# Patient Record
Sex: FEMALE | Race: WHITE | Employment: OTHER | URBAN - METROPOLITAN AREA
[De-identification: names, ages, dates, MRNs, and addresses within clinical notes are randomized per-mention and may not be internally consistent; named-entity substitution may affect disease eponyms.]

---

## 2017-04-12 ENCOUNTER — HOSPITAL ENCOUNTER (OUTPATIENT)
Dept: OTHER | Age: 52
Discharge: HOME | End: 2017-04-12
Attending: INTERNAL MEDICINE | Admitting: INTERNAL MEDICINE

## 2017-04-12 LAB
ALBUMIN SERPL-MCNC: 4.8 GM/DL (ref 3.4–5)
ALP BLD-CCNC: 164 IU/L (ref 40–128)
ALT SERPL-CCNC: 16 U/L (ref 10–40)
ANION GAP SERPL CALCULATED.3IONS-SCNC: 14 MMOL/L (ref 4–16)
AST SERPL-CCNC: 16 IU/L (ref 15–37)
BILIRUB SERPL-MCNC: 0.6 MG/DL (ref 0–1)
BUN BLDV-MCNC: 12 MG/DL (ref 6–23)
CALCIUM SERPL-MCNC: 10 MG/DL (ref 8.3–10.6)
CHLORIDE BLD-SCNC: 100 MMOL/L (ref 99–110)
CO2: 25 MMOL/L (ref 21–32)
CREAT SERPL-MCNC: 0.7 MG/DL (ref 0.6–1.1)
GFR AFRICAN AMERICAN: >60 ML/MIN/1.73M2
GFR NON-AFRICAN AMERICAN: >60 ML/MIN/1.73M2
GLUCOSE BLD-MCNC: 105 MG/DL (ref 70–140)
POTASSIUM SERPL-SCNC: 5 MMOL/L (ref 3.5–5.1)
SODIUM BLD-SCNC: 139 MMOL/L (ref 135–145)
TOTAL PROTEIN: 7.5 GM/DL (ref 6.4–8.2)

## 2017-11-16 ENCOUNTER — HOSPITAL ENCOUNTER (OUTPATIENT)
Dept: CT IMAGING | Age: 52
Discharge: HOME | End: 2017-11-16
Attending: INTERNAL MEDICINE | Admitting: INTERNAL MEDICINE

## 2017-11-16 DIAGNOSIS — J43.9 PULMONARY EMPHYSEMA, UNSPECIFIED EMPHYSEMA TYPE (HCC): ICD-10-CM

## 2017-11-17 ENCOUNTER — HOSPITAL ENCOUNTER (OUTPATIENT)
Dept: PULMONOLOGY | Age: 52
Discharge: HOME | End: 2017-11-17
Attending: INTERNAL MEDICINE | Admitting: INTERNAL MEDICINE

## 2017-11-17 DIAGNOSIS — J43.9 PULMONARY EMPHYSEMA (HCC): ICD-10-CM

## 2018-04-13 ENCOUNTER — HOSPITAL ENCOUNTER (OUTPATIENT)
Dept: CT IMAGING | Age: 53
Discharge: HOME | End: 2018-04-13
Attending: INTERNAL MEDICINE | Admitting: INTERNAL MEDICINE

## 2018-04-13 DIAGNOSIS — C34.12 MALIGNANT NEOPLASM OF UPPER LOBE, LEFT BRONCHUS OR LUNG (HCC): ICD-10-CM

## 2018-04-13 DIAGNOSIS — C34.12 CANCER OF BRONCHUS OF LEFT UPPER LOBE (HCC): ICD-10-CM

## 2018-04-13 RX ORDER — SODIUM CHLORIDE 0.9 % (FLUSH) 0.9 %
10 SYRINGE (ML) INJECTION ONCE
Status: COMPLETED | OUTPATIENT
Start: 2018-04-13 | End: 2018-04-13

## 2018-04-13 RX ADMIN — Medication 10 ML: at 13:21

## 2018-04-16 ENCOUNTER — HOSPITAL ENCOUNTER (OUTPATIENT)
Dept: OTHER | Age: 53
Discharge: HOME | End: 2018-04-16
Attending: INTERNAL MEDICINE | Admitting: INTERNAL MEDICINE

## 2018-04-16 LAB
ALBUMIN SERPL-MCNC: 4.5 GM/DL (ref 3.4–5)
ALP BLD-CCNC: 158 IU/L (ref 40–129)
ALT SERPL-CCNC: 17 U/L (ref 10–40)
ANION GAP SERPL CALCULATED.3IONS-SCNC: 14 MMOL/L (ref 4–16)
AST SERPL-CCNC: 22 IU/L (ref 15–37)
BILIRUB SERPL-MCNC: 0.4 MG/DL (ref 0–1)
BUN BLDV-MCNC: 15 MG/DL (ref 6–23)
CALCIUM SERPL-MCNC: 9.6 MG/DL (ref 8.3–10.6)
CHLORIDE BLD-SCNC: 100 MMOL/L (ref 99–110)
CO2: 26 MMOL/L (ref 21–32)
CREAT SERPL-MCNC: 0.8 MG/DL (ref 0.6–1.1)
GFR AFRICAN AMERICAN: >60 ML/MIN/1.73M2
GFR NON-AFRICAN AMERICAN: >60 ML/MIN/1.73M2
GLUCOSE BLD-MCNC: 129 MG/DL (ref 70–99)
POTASSIUM SERPL-SCNC: 4.1 MMOL/L (ref 3.5–5.1)
SODIUM BLD-SCNC: 140 MMOL/L (ref 135–145)
TOTAL PROTEIN: 7.2 GM/DL (ref 6.4–8.2)

## 2018-07-20 ENCOUNTER — HOSPITAL ENCOUNTER (OUTPATIENT)
Dept: OTHER | Age: 53
Discharge: HOME | End: 2018-07-20
Attending: INTERNAL MEDICINE | Admitting: INTERNAL MEDICINE

## 2018-07-20 ENCOUNTER — HOSPITAL ENCOUNTER (OUTPATIENT)
Dept: CT IMAGING | Age: 53
Discharge: HOME | End: 2018-07-20
Attending: INTERNAL MEDICINE | Admitting: INTERNAL MEDICINE

## 2018-07-20 DIAGNOSIS — C34.12 MALIGNANT NEOPLASM OF UPPER LOBE, LEFT BRONCHUS OR LUNG (HCC): ICD-10-CM

## 2018-07-20 DIAGNOSIS — C34.12 CANCER OF BRONCHUS OF LEFT UPPER LOBE (HCC): ICD-10-CM

## 2018-07-20 LAB
ALBUMIN SERPL-MCNC: 4.6 GM/DL (ref 3.4–5)
ALP BLD-CCNC: 139 IU/L (ref 40–128)
ALT SERPL-CCNC: 19 U/L (ref 10–40)
ANION GAP SERPL CALCULATED.3IONS-SCNC: 16 MMOL/L (ref 4–16)
AST SERPL-CCNC: 20 IU/L (ref 15–37)
BILIRUB SERPL-MCNC: 0.6 MG/DL (ref 0–1)
BUN BLDV-MCNC: 19 MG/DL (ref 6–23)
CALCIUM SERPL-MCNC: 9.9 MG/DL (ref 8.3–10.6)
CHLORIDE BLD-SCNC: 103 MMOL/L (ref 99–110)
CO2: 25 MMOL/L (ref 21–32)
CREAT SERPL-MCNC: 0.8 MG/DL (ref 0.6–1.1)
GFR AFRICAN AMERICAN: >60 ML/MIN/1.73M2
GFR NON-AFRICAN AMERICAN: >60 ML/MIN/1.73M2
GLUCOSE BLD-MCNC: 219 MG/DL (ref 70–99)
POTASSIUM SERPL-SCNC: 4.2 MMOL/L (ref 3.5–5.1)
SODIUM BLD-SCNC: 144 MMOL/L (ref 135–145)
TOTAL PROTEIN: 7.4 GM/DL (ref 6.4–8.2)

## 2018-07-20 RX ORDER — 0.9 % SODIUM CHLORIDE 0.9 %
10 VIAL (ML) INJECTION PRN
Status: DISCONTINUED | OUTPATIENT
Start: 2018-07-20 | End: 2018-07-21 | Stop reason: HOSPADM

## 2018-07-20 RX ADMIN — Medication 10 ML: at 13:00

## 2019-01-22 ENCOUNTER — HOSPITAL ENCOUNTER (OUTPATIENT)
Age: 54
Setting detail: SPECIMEN
Discharge: HOME | End: 2019-01-22
Payer: COMMERCIAL

## 2019-01-22 LAB
ALBUMIN SERPL-MCNC: 5.5 GM/DL (ref 3.4–5)
ALP BLD-CCNC: 156 IU/L (ref 40–128)
ALT SERPL-CCNC: 25 U/L (ref 10–40)
ANION GAP SERPL CALCULATED.3IONS-SCNC: 14 MMOL/L (ref 4–16)
AST SERPL-CCNC: 25 IU/L (ref 15–37)
BILIRUB SERPL-MCNC: 0.4 MG/DL (ref 0–1)
BUN BLDV-MCNC: 11 MG/DL (ref 6–23)
CALCIUM SERPL-MCNC: 10.4 MG/DL (ref 8.3–10.6)
CHLORIDE BLD-SCNC: 101 MMOL/L (ref 99–110)
CO2: 26 MMOL/L (ref 21–32)
CREAT SERPL-MCNC: 0.8 MG/DL (ref 0.6–1.1)
GFR AFRICAN AMERICAN: >60 ML/MIN/1.73M2
GFR NON-AFRICAN AMERICAN: >60 ML/MIN/1.73M2
GLUCOSE BLD-MCNC: 112 MG/DL (ref 70–99)
POTASSIUM SERPL-SCNC: 4.7 MMOL/L (ref 3.5–5.1)
SODIUM BLD-SCNC: 141 MMOL/L (ref 135–145)
TOTAL PROTEIN: 8 GM/DL (ref 6.4–8.2)

## 2019-01-22 PROCEDURE — 80053 COMPREHEN METABOLIC PANEL: CPT

## 2019-04-12 ENCOUNTER — HOSPITAL ENCOUNTER (OUTPATIENT)
Age: 54
Discharge: HOME | End: 2019-04-12
Payer: MEDICARE

## 2019-04-12 ENCOUNTER — HOSPITAL ENCOUNTER (OUTPATIENT)
Dept: GENERAL RADIOLOGY | Age: 54
Discharge: HOME | End: 2019-04-12
Payer: MEDICARE

## 2019-04-12 DIAGNOSIS — C34.90 NON-SMALL CELL LUNG CANCER, UNSPECIFIED LATERALITY (HCC): ICD-10-CM

## 2019-04-12 PROCEDURE — 71046 X-RAY EXAM CHEST 2 VIEWS: CPT

## 2019-10-17 ENCOUNTER — HOSPITAL ENCOUNTER (OUTPATIENT)
Age: 54
Setting detail: SPECIMEN
Discharge: HOME | End: 2019-10-17
Payer: COMMERCIAL

## 2019-10-17 LAB
ALBUMIN SERPL-MCNC: 4.7 GM/DL (ref 3.4–5)
ALP BLD-CCNC: 126 IU/L (ref 40–128)
ALT SERPL-CCNC: 24 U/L (ref 10–40)
ANION GAP SERPL CALCULATED.3IONS-SCNC: 15 MMOL/L (ref 4–16)
AST SERPL-CCNC: 24 IU/L (ref 15–37)
BILIRUB SERPL-MCNC: 0.4 MG/DL (ref 0–1)
BUN BLDV-MCNC: 14 MG/DL (ref 6–23)
CALCIUM SERPL-MCNC: 9.9 MG/DL (ref 8.3–10.6)
CHLORIDE BLD-SCNC: 105 MMOL/L (ref 99–110)
CO2: 25 MMOL/L (ref 21–32)
CREAT SERPL-MCNC: 0.7 MG/DL (ref 0.6–1.1)
GFR AFRICAN AMERICAN: >60 ML/MIN/1.73M2
GFR NON-AFRICAN AMERICAN: >60 ML/MIN/1.73M2
GLUCOSE BLD-MCNC: 135 MG/DL (ref 70–99)
POTASSIUM SERPL-SCNC: 3.9 MMOL/L (ref 3.5–5.1)
SODIUM BLD-SCNC: 145 MMOL/L (ref 135–145)
TOTAL PROTEIN: 7.1 GM/DL (ref 6.4–8.2)

## 2019-10-17 PROCEDURE — 80053 COMPREHEN METABOLIC PANEL: CPT

## 2019-10-18 ENCOUNTER — HOSPITAL ENCOUNTER (OUTPATIENT)
Dept: CT IMAGING | Age: 54
Discharge: HOME | End: 2019-10-18
Payer: MEDICARE

## 2019-10-18 DIAGNOSIS — C34.12 CANCER OF BRONCHUS OF LEFT UPPER LOBE (HCC): ICD-10-CM

## 2019-10-18 PROCEDURE — 6360000004 HC RX CONTRAST MEDICATION: Performed by: INTERNAL MEDICINE

## 2019-10-18 PROCEDURE — 71260 CT THORAX DX C+: CPT

## 2019-10-18 RX ADMIN — IOPAMIDOL 75 ML: 755 INJECTION, SOLUTION INTRAVENOUS at 09:04

## 2020-01-17 ENCOUNTER — HOSPITAL ENCOUNTER (OUTPATIENT)
Dept: CT IMAGING | Age: 55
Discharge: HOME | End: 2020-01-17
Payer: MEDICARE

## 2020-01-17 ENCOUNTER — HOSPITAL ENCOUNTER (OUTPATIENT)
Dept: INFUSION THERAPY | Age: 55
Discharge: HOME | End: 2020-01-17
Payer: MEDICARE

## 2020-01-17 LAB
ALBUMIN SERPL-MCNC: 5.1 GM/DL (ref 3.4–5)
ALP BLD-CCNC: 138 IU/L (ref 40–128)
ALT SERPL-CCNC: 29 U/L (ref 10–40)
ANION GAP SERPL CALCULATED.3IONS-SCNC: 14 MMOL/L (ref 4–16)
AST SERPL-CCNC: 24 IU/L (ref 15–37)
BILIRUB SERPL-MCNC: 0.5 MG/DL (ref 0–1)
BUN BLDV-MCNC: 19 MG/DL (ref 6–23)
CALCIUM SERPL-MCNC: 10.3 MG/DL (ref 8.3–10.6)
CHLORIDE BLD-SCNC: 100 MMOL/L (ref 99–110)
CO2: 25 MMOL/L (ref 21–32)
CREAT SERPL-MCNC: 0.6 MG/DL (ref 0.6–1.1)
DIFFERENTIAL TYPE: ABNORMAL
EOSINOPHILS ABSOLUTE: 0.1 K/CU MM
EOSINOPHILS RELATIVE PERCENT: 1 % (ref 0–3)
GFR AFRICAN AMERICAN: >60 ML/MIN/1.73M2
GFR NON-AFRICAN AMERICAN: >60 ML/MIN/1.73M2
GLUCOSE BLD-MCNC: 85 MG/DL (ref 70–99)
HCT VFR BLD CALC: 48.2 % (ref 37–47)
HEMOGLOBIN: 16.5 GM/DL (ref 12.5–16)
LYMPHOCYTES ABSOLUTE: 2.5 K/CU MM
LYMPHOCYTES RELATIVE PERCENT: 40 % (ref 24–44)
MCH RBC QN AUTO: 31 PG (ref 27–31)
MCHC RBC AUTO-ENTMCNC: 34.2 % (ref 32–36)
MCV RBC AUTO: 90.4 FL (ref 78–100)
MONOCYTES ABSOLUTE: 0.5 K/CU MM
MONOCYTES RELATIVE PERCENT: 8 % (ref 0–4)
PDW BLD-RTO: 14.4 % (ref 11.7–14.9)
PLATELET # BLD: 244 K/CU MM (ref 140–440)
PMV BLD AUTO: 10.4 FL (ref 7.5–11.1)
POTASSIUM SERPL-SCNC: 4.7 MMOL/L (ref 3.5–5.1)
RBC # BLD: 5.33 M/CU MM (ref 4.2–5.4)
SEGMENTED NEUTROPHILS ABSOLUTE COUNT: 3.2 K/CU MM
SEGMENTED NEUTROPHILS RELATIVE PERCENT: 51 % (ref 36–66)
SODIUM BLD-SCNC: 139 MMOL/L (ref 135–145)
TOTAL PROTEIN: 8 GM/DL (ref 6.4–8.2)
WBC # BLD: 6.3 K/CU MM (ref 4–10.5)

## 2020-01-17 PROCEDURE — 71260 CT THORAX DX C+: CPT

## 2020-01-17 PROCEDURE — 80053 COMPREHEN METABOLIC PANEL: CPT

## 2020-01-17 PROCEDURE — 6360000004 HC RX CONTRAST MEDICATION: Performed by: INTERNAL MEDICINE

## 2020-01-17 PROCEDURE — 36415 COLL VENOUS BLD VENIPUNCTURE: CPT

## 2020-01-17 PROCEDURE — 85025 COMPLETE CBC W/AUTO DIFF WBC: CPT

## 2020-01-17 RX ADMIN — IOPAMIDOL 75 ML: 755 INJECTION, SOLUTION INTRAVENOUS at 09:26

## 2020-04-02 PROBLEM — C34.12 MALIGNANT NEOPLASM OF UPPER LOBE BRONCHUS, LEFT (HCC): Status: ACTIVE | Noted: 2020-04-02

## 2020-05-26 ENCOUNTER — HOSPITAL ENCOUNTER (OUTPATIENT)
Dept: INFUSION THERAPY | Age: 55
Discharge: HOME | End: 2020-05-26
Payer: MEDICARE

## 2020-05-26 LAB
ALBUMIN SERPL-MCNC: 4.6 GM/DL (ref 3.4–5)
ALP BLD-CCNC: 148 IU/L (ref 40–129)
ALT SERPL-CCNC: 18 U/L (ref 10–40)
ANION GAP SERPL CALCULATED.3IONS-SCNC: 13 MMOL/L (ref 4–16)
AST SERPL-CCNC: 17 IU/L (ref 15–37)
BASOPHILS ABSOLUTE: 0 K/CU MM
BASOPHILS RELATIVE PERCENT: 0.2 % (ref 0–1)
BILIRUB SERPL-MCNC: 0.5 MG/DL (ref 0–1)
BUN BLDV-MCNC: 10 MG/DL (ref 6–23)
CALCIUM SERPL-MCNC: 9.2 MG/DL (ref 8.3–10.6)
CHLORIDE BLD-SCNC: 103 MMOL/L (ref 99–110)
CO2: 25 MMOL/L (ref 21–32)
CREAT SERPL-MCNC: 0.7 MG/DL (ref 0.6–1.1)
DIFFERENTIAL TYPE: ABNORMAL
EOSINOPHILS ABSOLUTE: 0.1 K/CU MM
EOSINOPHILS RELATIVE PERCENT: 1 % (ref 0–3)
GFR AFRICAN AMERICAN: >60 ML/MIN/1.73M2
GFR NON-AFRICAN AMERICAN: >60 ML/MIN/1.73M2
GLUCOSE BLD-MCNC: 111 MG/DL (ref 70–99)
HCT VFR BLD CALC: 42.3 % (ref 37–47)
HEMOGLOBIN: 14.6 GM/DL (ref 12.5–16)
LYMPHOCYTES ABSOLUTE: 2.8 K/CU MM
LYMPHOCYTES RELATIVE PERCENT: 44.9 % (ref 24–44)
MCH RBC QN AUTO: 31.1 PG (ref 27–31)
MCHC RBC AUTO-ENTMCNC: 34.5 % (ref 32–36)
MCV RBC AUTO: 90.2 FL (ref 78–100)
MONOCYTES ABSOLUTE: 0.5 K/CU MM
MONOCYTES RELATIVE PERCENT: 7.7 % (ref 0–4)
PDW BLD-RTO: 14.3 % (ref 11.7–14.9)
PLATELET # BLD: 211 K/CU MM (ref 140–440)
PMV BLD AUTO: 10.3 FL (ref 7.5–11.1)
POTASSIUM SERPL-SCNC: 4.1 MMOL/L (ref 3.5–5.1)
RBC # BLD: 4.69 M/CU MM (ref 4.2–5.4)
SEGMENTED NEUTROPHILS ABSOLUTE COUNT: 2.9 K/CU MM
SEGMENTED NEUTROPHILS RELATIVE PERCENT: 46.2 % (ref 36–66)
SODIUM BLD-SCNC: 141 MMOL/L (ref 135–145)
TOTAL PROTEIN: 7.1 GM/DL (ref 6.4–8.2)
WBC # BLD: 6.2 K/CU MM (ref 4–10.5)

## 2020-05-26 PROCEDURE — 80053 COMPREHEN METABOLIC PANEL: CPT

## 2020-05-26 PROCEDURE — 85025 COMPLETE CBC W/AUTO DIFF WBC: CPT

## 2020-05-26 PROCEDURE — 36415 COLL VENOUS BLD VENIPUNCTURE: CPT

## 2020-06-01 ENCOUNTER — HOSPITAL ENCOUNTER (OUTPATIENT)
Dept: INFUSION THERAPY | Age: 55
Discharge: HOME | End: 2020-06-01
Payer: MEDICARE

## 2020-06-01 PROCEDURE — 99211 OFF/OP EST MAY X REQ PHY/QHP: CPT

## 2020-06-08 ENCOUNTER — HOSPITAL ENCOUNTER (OUTPATIENT)
Dept: PET IMAGING | Age: 55
Discharge: HOME | End: 2020-06-08
Payer: MEDICARE

## 2020-06-08 PROCEDURE — A9552 F18 FDG: HCPCS | Performed by: INTERNAL MEDICINE

## 2020-06-08 PROCEDURE — 78815 PET IMAGE W/CT SKULL-THIGH: CPT

## 2020-06-08 PROCEDURE — 2580000003 HC RX 258: Performed by: INTERNAL MEDICINE

## 2020-06-08 PROCEDURE — 3430000000 HC RX DIAGNOSTIC RADIOPHARMACEUTICAL: Performed by: INTERNAL MEDICINE

## 2020-06-08 RX ORDER — SODIUM CHLORIDE 0.9 % (FLUSH) 0.9 %
10 SYRINGE (ML) INJECTION PRN
Status: COMPLETED | OUTPATIENT
Start: 2020-06-08 | End: 2020-06-08

## 2020-06-08 RX ORDER — FLUDEOXYGLUCOSE F 18 200 MCI/ML
13.53 INJECTION, SOLUTION INTRAVENOUS
Status: COMPLETED | OUTPATIENT
Start: 2020-06-08 | End: 2020-06-08

## 2020-06-08 RX ADMIN — FLUDEOXYGLUCOSE F 18 13.53 MILLICURIE: 200 INJECTION, SOLUTION INTRAVENOUS at 09:33

## 2020-06-08 RX ADMIN — SODIUM CHLORIDE, PRESERVATIVE FREE 10 ML: 5 INJECTION INTRAVENOUS at 09:33

## 2020-06-15 PROBLEM — R91.1 PULMONARY NODULE: Status: ACTIVE | Noted: 2020-06-15

## 2020-09-02 ENCOUNTER — HOSPITAL ENCOUNTER (OUTPATIENT)
Dept: CT IMAGING | Age: 55
Discharge: HOME | End: 2020-09-02
Payer: MEDICARE

## 2020-09-02 PROCEDURE — 71250 CT THORAX DX C-: CPT

## 2020-09-08 NOTE — PROGRESS NOTES
Patient Name: Reymundo Cheng  Patient : 1965  Patient MRN: [id number]     Primary Oncologist: Eileen Brady MD  Referring Provider: Brett Craft MD     Date of Service: 2020      Chief Complaint:   Chief Complaint   Patient presents with    3 Month Follow-Up     She came in for follow-up visit. Patient Active Problem List:     Syncope     Tobacco use disorder     Hyperlipidemia     Hemiparesis (HCC)     Malignant neoplasm of upper lobe bronchus, left (HCC)     Pulmonary nodule     HPI:   Mrs. Jose Napier is a pleasant 67-year-old female patient with history of lung surgery due to the bullae in , performed by Dr. Linda Tony to prevent lung collapse, and high-grade adenocarcinoma of the lung, status post left upper lobe mass biopsy on 2014. The pathology report showed high-grade carcinoma with abundant necrosis. Immunostain study showed that the tumor cells were positive for CK7 and TTF1 and negative for CK5-6, Napsin A, and B63. The tumor was consistent with high-grade adenocarcinoma of the lung origin. EGFR and ALK rearrangement studies were negative. She went to the emergency room in 2013 with complaint of pain to the ribcage. A CT of the chest without contrast on 2013 showed a 6.2 by 4.8 by 6.9 cm partially calcified large infiltrative mass involving the left upper lobe and multiple small mediastinal and left hilar nodes. Metastatic disease could not be excluded. Indeterminate hypodense lesions within the liver and involving the pancreas were described. Left adrenal gland was mildly hyperplastic. CT abdomen and pelvis on 2013 showed benign appearing left hepatic lobe mass, which was seen in 2007. This was probably an hemangioma. A smaller satellite nodule appeared benign and was probably a cyst.  No compelling evidence for solid organ metastases.   There was no evidence of abnormal enhancement in the pancreatic head or Pt here for 2nd Hepatitis A vaccine. Vaccine administered to left deltoid. Patient tolerated well. neck.  Benign appearing simple cysts in the upper pole of the left kidney and bilateral nephrolithiasis were described. I discussed with her,  therefore, biopsy of the liver was not done. Blood test in December 2013 showed sodium 135, potassium 3.8, bun 17, creatinine 0.7, white cell 5.4, hemoglobin 14.2, platelet 725. CMP was December 24, 2013, showed normal sodium at 140, potassium 4.0, BUN 20, creatinine 0.7, calcium 9.8, alk phos 136, ALT 16, AST 21, albumin 4.1, total protein 7.6, total bilirubin 0.4. Her pulmonary function test was okay. She was seen by Dr. Katelynn Bueno at [facility], who  ordered pretest study on February 7, 2014, including MRI of the brain, echo, and stress test.    PET-CT scan on January 13, 2014 showed:  1. Large left lung neoplasm., 2. Probable left hilar and mediastinal puja metastases. , 3. Asymmetric hypermetabolic activity involving the cecum. CT brain on January 8, 2014 was a normal study. Mediastinoscopy on February 14, 2014 showed: reportedly all nodes sampled were negative for the cancer. She underwent left lung pneumonectomy with lymph node sampling on February 27, 2014. Pathology report showed solid predominant adenocarcinoma measuring 7.9 by 6.2 by 5.1 cm in the left upper lobe, grade 3. No visceral invasion present. The tumor was confined to the lung parenchyma. The bronchial margin was negative. There was extensive lymphatic/vascular invasion and one in two lymph nodes was negative for malignancy and pathologically she was staged as T3, N0, MX.  ALK study was negative for rearrangements. She was recommended to consider adjuvant chemotherapy. I went over the NCCN Guidelines with her and I put her on cisplatin/Alimta regimen. I gave her B12 injections before the Alimta. She received her first adjuvant treatment with Alimta/cisplatin on March 19, 2014, and completed the fourth cycle of adjuvant cisplatinum/Alimta on May 21, 2014.     Blood test in May 2014 showed white cell count of 3.7, hemoglobin 13.3, platelet 780, BUN 16, creatinine 0.6, calcium 9.0, albumin 4.5, total protein 7.4, total bilirubin 0.4, alk phos 143, AST 23, ALT 18, magnesium 2.0. I planned to have a CT scan every six months in the first two or three years and yearly afterwards. CT chest in June 2014 showed no evidence of metastatic disease. She passed a kidney stone. Blood tests in December 2014 showed normal CBC. CMP was stable, with alk phos 150 and calcium 9.2. A CT of the chest in December 2014 showed no evidence of metastatic disease and no significant interval change compared to previous study. She had colonoscopy in July 2014 by Dr. Rhodia Sicard and reportedly there were no polyps. Blood test in June 2015 showed normal CBC. CMP was stable with alk phos 165. CT of the chest in June 2015 showed no evidence of progression of the disease. The osseous and soft tissue structures appeared otherwise normal.   CT chest in December 2015 revealed no evidence of metastatic disease or progression of the cancer. Her blood test in October 2015 revealed normal CBC and CMP. TSH was 0.88, ferritin 78, B12 281. Iron was 92. Ferritin was 78. CMP was within normal limits. Blood test in July 2016 revealed normal CBC and CMP. CT of the chest in June 2016 revealed no evidence of progression of the disease. CT chest in April 2018 revealed stable post surgical changes from the left pneumonectomy with stable loculated pleural effusion and no evidence of metastatic disease in the chest. 4 mm ground-glass nodule in the right upper lobe was noted. Follow-up study in 3 months was recommended by the radiologist.    F/U CT chest in July 2018 :  Previously visualized ground-glass nodule no longer visualized. No significant change in appearance of the chest otherwise. CXR in April 2019 showed no acute abnormality. She quit smoking in March 2019.     CT chest in October 2019 showed :  1. Stable postsurgical changes from left pneumonectomy. 2. New 5 mm subsolid nodule in the right middle lobe adjacent to the minor fissure. This may have been present on the prior exams but had a more subtle ground-glass appearance on the prior exams. Recommend a short-term follow-up in 3 months versus per clinical protocol. 3. Emphysematous changes. She is a secondhand smoker.  continues to smoke. Maternal aunt had recurrent lung cancer. CT chest in January 2020 showed stable 6 mm irregular RML nodule, more solid concerning for malignancy. Too small for PET or biopsy. On June 1, 2020 she came in for follow-up visit. She went to [address] [place] [address].  CT chest on May 4, 2020 showed slight increased size of the spiculated subpleural right middle lobe nodule, now approximately 10 mm maximal diameter previously 6 mm. Other ill-defined groundglass nodules in the right upper lobe and right lower lobe appeared similar. Suspect simple appearing left renal and left hepatic cyst, nonobstructing right nephrolithiasis. I discussed with Dr. Teresa Layton thoracic surgeon who is agreeable with PET CT scan. She smokes few cigarettes a day. She decided that she may not consider any chemotherapy, should the disease come back again  CBC was stable. CMP showed increased alkaline phosphatase. On September 9, 2020 she came in for follow-up visit. PET CT scan in June 2020 and CT chest in September 2020 were reviewed. Biopsy will be difficult. Due to enlarging nodule I have referred to radiation oncologist for potential stereotactic radiotherapy. Energy level is fine. Appetite is alright. She denied any night sweats, fever, or chills. No hemoptysis. No nausea, vomiting, dysuria, or hematuria. No acute pain.       PAST MEDICAL HISTORY:  History of lung disease, status post lung surgery in 2004, history of bilateral mastectomy due to the frequent lumpectomies in 2005 by Dr. Royal Beckford, history of cholecystectomy in [facility], [facility] in 2012. She had a history of dyslipidemia. FAMILY HISTORY:  Father had lung cancer and he was a smoker. Mother had throat cancer and colon cancer in her seventies. Maternal grandfather had colon cancer in his eighties. One maternal aunt had lung cancer. She stated one cousin was diagnosed with pancreatic cancer. SOCIAL HISTORY:  She smoked about one pack per day for 35 years and she is using electronic cigarettes. She drinks alcohol occasionally. She is  and has one child. She does clerical work. ALLERGIES:  Erythromycin, tape, she has intolerance to Vicodin and codeine. MEDICATIONS:  Reviewed as per chart. LABORATORY STUDIES:   January 6, 2014: White cell count 9.2, hemoglobin 13.4, platelet 488, BUN 17, creatinine 0.6, AST 20, ALT 11, calcium 9.6, alk phos 113. Review of Systems: \"Per interval history; otherwise 10 point ROS is negative. \"     Vital Signs:  /85 (Site: Right Upper Arm, Position: Sitting, Cuff Size: Small Adult)   Pulse 68   Temp 98.6 °F (37 °C) (Infrared)   Resp 16   Ht 5' (1.524 m)   Wt 88 lb 3.2 oz (40 kg)   SpO2 97%   BMI 17.23 kg/m²     Physical Exam:  CONSTITUTIONAL: awake, alert, cooperative, no apparent distress   EYES: pupils equal, round and reactive to light, sclera clear and conjunctiva normal  ENT: Normocephalic, without obvious abnormality, atraumatic  NECK: supple, symmetrical, no jugular venous distension and no carotid bruits   HEMATOLOGIC/LYMPHATIC: no cervical, supraclavicular or axillary lymphadenopathy   LUNGS: no increased work of breathing and clear to auscultation   CARDIOVASCULAR: regular rate and rhythm, normal S1 and S2, no murmur noted  ABDOMEN: normal bowel sounds x 4, soft, non-distended, non-tender, no masses palpated, no hepatosplenomegaly   MUSCULOSKELETAL: full range of motion noted, tone is normal  NEUROLOGIC: awake, alert, oriented to name, place and time. Motor skills grossly intact.    SKIN: Normal skin negative for recurrent disease. CBC and CMP in July 2016 were within normal limits. CT chest in April 2017 revealed no evidence of progression of the disease. CT chest in April 2018 revealed stable findings with 4 mm ground-glass nodule to the right upper lung. Follow-up CT chest in July 2018 was reviewed. She is agreeable to continue with active surveillance. CXR in April 2019 was non acute. CT chest in October 2019 showed stable findings with new 5 mm sub-solid nodule in the right middle lobe. CT chest in January 2020 was reviewed. CT chest in May 2020 showed enlarging right middle lung nodule. PET scan in June 2020 and CT chest in September 2020 were reviewed. Since the nodule continues to get larger. I referred to see radiation oncologist for potential stereotactic radiotherapy. She is agreeable to this plan. 2.  I advised her to keep her other age appropriate cancer screening up-to-date. She had bilateral mastectomy, and colonoscopy in 2014.    3.  We discussed about healthy diet and lifestyle. 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 We again discussed about smoking cessation. RTC in 4 weeks or sooner. All of their questions have been answered for today. Recent imaging and labs were reviewed and discussed with the patient.       Electronically signed by Lance Waters MD on 9/8/20 at 8:55 AM EDT

## 2020-09-09 ENCOUNTER — OFFICE VISIT (OUTPATIENT)
Dept: ONCOLOGY | Age: 55
End: 2020-09-09
Payer: MEDICARE

## 2020-09-09 ENCOUNTER — HOSPITAL ENCOUNTER (OUTPATIENT)
Dept: INFUSION THERAPY | Age: 55
Discharge: HOME | End: 2020-09-09
Payer: MEDICARE

## 2020-09-09 VITALS
DIASTOLIC BLOOD PRESSURE: 85 MMHG | WEIGHT: 88.2 LBS | RESPIRATION RATE: 16 BRPM | OXYGEN SATURATION: 97 % | TEMPERATURE: 98.6 F | SYSTOLIC BLOOD PRESSURE: 134 MMHG | BODY MASS INDEX: 17.32 KG/M2 | HEIGHT: 60 IN | HEART RATE: 68 BPM

## 2020-09-09 PROCEDURE — 99211 OFF/OP EST MAY X REQ PHY/QHP: CPT

## 2020-09-09 PROCEDURE — 99214 OFFICE O/P EST MOD 30 MIN: CPT | Performed by: INTERNAL MEDICINE

## 2020-09-09 PROCEDURE — 3017F COLORECTAL CA SCREEN DOC REV: CPT | Performed by: INTERNAL MEDICINE

## 2020-09-09 PROCEDURE — 4004F PT TOBACCO SCREEN RCVD TLK: CPT | Performed by: INTERNAL MEDICINE

## 2020-09-09 PROCEDURE — G8419 CALC BMI OUT NRM PARAM NOF/U: HCPCS | Performed by: INTERNAL MEDICINE

## 2020-09-09 PROCEDURE — G8427 DOCREV CUR MEDS BY ELIG CLIN: HCPCS | Performed by: INTERNAL MEDICINE

## 2020-09-09 ASSESSMENT — PATIENT HEALTH QUESTIONNAIRE - PHQ9
1. LITTLE INTEREST OR PLEASURE IN DOING THINGS: 1
SUM OF ALL RESPONSES TO PHQ QUESTIONS 1-9: 2
SUM OF ALL RESPONSES TO PHQ QUESTIONS 1-9: 2
2. FEELING DOWN, DEPRESSED OR HOPELESS: 1
SUM OF ALL RESPONSES TO PHQ9 QUESTIONS 1 & 2: 2

## 2020-09-09 NOTE — PROGRESS NOTES
MA Rooming Questions  Patient: Bakari Orellana  MRN: [id number]    Date: 9/9/2020        1. Do you have any new issues?   no         2. Do you need any refills on medications?    no    3. Have you had any imaging done since your last visit? yes - [facility]    4. Have you been hospitalized or seen in the emergency room since your last visit here?   no    5. Did the patient have a depression screening completed today?  Yes    PHQ-9 Total Score: 2 (9/9/2020  9:13 AM)       PHQ-9 Given to (if applicable):               PHQ-9 Score (if applicable):                     [] Positive     []  Negative              Does question #9 need addressed (if applicable)                     [] Yes    []  No               Quin Panchal MA

## 2020-09-17 ENCOUNTER — HOSPITAL ENCOUNTER (OUTPATIENT)
Dept: RADIATION ONCOLOGY | Age: 55
Discharge: HOME | End: 2020-09-17
Payer: MEDICARE

## 2020-09-17 VITALS
HEIGHT: 60 IN | HEART RATE: 82 BPM | DIASTOLIC BLOOD PRESSURE: 79 MMHG | TEMPERATURE: 96.6 F | WEIGHT: 89.2 LBS | RESPIRATION RATE: 16 BRPM | BODY MASS INDEX: 17.51 KG/M2 | SYSTOLIC BLOOD PRESSURE: 119 MMHG | OXYGEN SATURATION: 97 %

## 2020-09-17 PROCEDURE — 99202 OFFICE O/P NEW SF 15 MIN: CPT

## 2020-09-17 PROCEDURE — 99205 OFFICE O/P NEW HI 60 MIN: CPT | Performed by: RADIOLOGY

## 2020-09-17 RX ORDER — COVID-19 ANTIGEN TEST
1 KIT MISCELLANEOUS EVERY 6 HOURS PRN
COMMUNITY

## 2020-09-17 ASSESSMENT — PAIN DESCRIPTION - DESCRIPTORS: DESCRIPTORS: ACHING

## 2020-09-17 ASSESSMENT — PAIN DESCRIPTION - PROGRESSION: CLINICAL_PROGRESSION: NOT CHANGED

## 2020-09-17 ASSESSMENT — PAIN DESCRIPTION - ORIENTATION: ORIENTATION: LOWER

## 2020-09-17 ASSESSMENT — PAIN DESCRIPTION - PAIN TYPE: TYPE: CHRONIC PAIN

## 2020-09-17 ASSESSMENT — PAIN DESCRIPTION - ONSET: ONSET: ON-GOING

## 2020-09-17 ASSESSMENT — PAIN DESCRIPTION - LOCATION: LOCATION: BACK

## 2020-09-17 ASSESSMENT — PAIN DESCRIPTION - FREQUENCY: FREQUENCY: CONTINUOUS

## 2020-09-17 ASSESSMENT — PAIN SCALES - GENERAL: PAINLEVEL_OUTOF10: 4

## 2020-09-17 ASSESSMENT — PAIN - FUNCTIONAL ASSESSMENT: PAIN_FUNCTIONAL_ASSESSMENT: PREVENTS OR INTERFERES SOME ACTIVE ACTIVITIES AND ADLS

## 2020-09-17 NOTE — PROGRESS NOTES
symptoms including chest pain, worsening dyspnea (has baseline ULRICH and can walk up a flight of stairs), cough, hemoptysis, weight loss, HA, visual changes, focal weakness/numbness, abd pain, bowel/bladder changes.       Past Medical History:   Diagnosis Date    Chronic kidney disease     poss kidney stones    CVA [facility]cerebral vascular accident) ([facility] [address] 2012    Hyperlipidemia     Lung collapse     Pneumonia     emphysema, COPD        Past Surgical History:   Procedure Laterality Date    BREAST SURGERY      double mastectomy (fibroid) after several lumpectomies    CHOLECYSTECTOMY      COLONOSCOPY  07/2014    FRACTURE SURGERY      HERNIA REPAIR      LUNG BIOPSY Left 01/02/2014    Left upper lobe mass    LUNG SURGERY Left 02/27/2014    Left peunomectomy with lymph node sampling    LUNG SURGERY  2001    MEDIASTINOSCOPY  02/14/2014    bx lymph nodes    TONSILLECTOMY         Family History   Problem Relation Age of Onset    Cancer Father     Heart Disease Father     Substance Abuse Father     Depression Paternal Aunt     Arthritis Maternal Grandfather     Diabetes Maternal Grandfather     High Blood Pressure Maternal Grandfather     Vision Loss Maternal Grandfather     Cancer Paternal Grandfather     High Cholesterol Mother     Miscarriages / [place] Sister        Social History     Socioeconomic History    Marital status:      Spouse name: Not on file    Number of children: Not on file    Years of education: Not on file    Highest education level: Not on file   Occupational History    Not on file   Social Needs    Financial resource strain: Not on file    Food insecurity     Worry: Not on file     Inability: Not on file    Transportation needs     Medical: Not on file     Non-medical: Not on file   Tobacco Use    Smoking status: Current Every Day Smoker     Packs/day: 0.25     Years: 43.00     Pack years: 10.75     Types: Cigarettes     Start date: 9/9/1977    Smokeless tobacco: Never Used   Substance and Sexual Activity    Alcohol use: Yes     Comment: 2 drinks a month    Drug use: Yes     Types: Marijuana     Comment: daily    Sexual activity: Yes     Partners: Male   Lifestyle    Physical activity     Days per week: Not on file     Minutes per session: Not on file    Stress: Not on file   Relationships    Social connections     Talks on phone: Not on file     Gets together: Not on file     Attends [employer] service: Not on file     Active member of club or organization: Not on file     Attends meetings of clubs or organizations: Not on file     Relationship status: Not on file    Intimate partner violence     Fear of current or ex partner: Not on file     Emotionally abused: Not on file     Physically abused: Not on file     Forced sexual activity: Not on file   Other Topics Concern    Not on file   Social History Narrative    Not on file         ALLERGIES:   Allergies   Allergen Reactions    Erythromycin Other (See Comments)     Was rushed to hospital, heart palpitations      Macrolides And Ketolides     Tape [Adhesive Tape]      blisters    Codeine Nausea And Vomiting    Vicodin [Hydrocodone-Acetaminophen] Nausea And Vomiting           Medication List      ASK your doctor about these medications     * albuterol 1.25 MG/3ML nebulizer solution; Commonly known as: ACCUNEB   * albuterol sulfate  (90 Base) MCG/ACT inhaler; Commonly known   as: Proventil HFA; Inhale 2 puffs into the lungs every 6 hours as needed   for Wheezing.  Aleve 220 MG Caps; Generic drug: Naproxen Sodium   ipratropium 0.02 % nebulizer solution; Commonly known as: ATROVENT; Take   2.5 mLs by nebulization 4 times daily  * This list has 2 medication(s) that are the same as other medications   prescribed for you. Read the directions carefully, and ask your doctor or   other care provider to review them with you.            REVIEW OF SYSTEMS:   Pertinents as in HPI, the remainder of the 14-point ROS is otherwise negative and has been signed and included in the medical record. PHYSICAL EXAMINATION:   VITAL SIGNS: /79   Pulse 82   Temp 96.6 °F (35.9 °C) (Tympanic)   Resp 16   Ht 5' (1.524 m)   Wt 89 lb 3.2 oz (40.5 kg)   SpO2 97%   BMI 17.42 kg/m²   KARNOFSKY PERFORMANCE STATUS: 100    PAIN RATIN    A&Ox3, NAD  Sclera anicteric, EOMI, no temporalis muscle wasting  No cervical, SCV LAD  Left lung without breath sounds, Right Lung CTA  Heart RRR, no m/r/g  Abd soft, NTND, no HSM  No spinal, paraspinal, or other bony tenderness to palpation  No UE/LE edema  CN 2-12 grossly intact without focal motor or sensory deficit  Normal mood, affect, judgement      LABORATORY STUDIES:   CBC:   Lab Results   Component Value Date    WBC 6.2 2020    RBC 4.69 2020    HGB 14.6 2020    HCT 42.3 2020    MCV 90.2 2020    MCH 31.1 2020    MCHC 34.5 2020    RDW 14.3 2020     2020    MPV 10.3 2020     CMP:  Lab Results   Component Value Date     2020    K 4.1 2020     2020    CO2 25 2020    BUN 10 2020    CREATININE 0.7 2020    GFRAA >60 2020    LABGLOM >60 2020    GLUCOSE 111 2020    PROT 7.1 2020    LABALBU 4.6 2020    CALCIUM 9.2 2020    BILITOT 0.5 2020    ALKPHOS 148 2020    AST 17 2020    ALT 18 2020         RADIOLOGIC STUDIES:     Ct Chest Wo Contrast   Result Date: 2020  EXAMINATION: CT OF THE CHEST WITHOUT CONTRAST 2020 9:38 am TECHNIQUE: CT of the chest was performed without the administration of intravenous contrast. Multiplanar reformatted images are provided for review. Dose modulation, iterative reconstruction, and/or weight based adjustment of the mA/kV was utilized to reduce the radiation dose to as low as reasonably achievable.  COMPARISON: Chest CT dated 2020 and PET-CT dated 2020 HISTORY:  Taya Kincaid chemo, immunotherapy, or targeted therapy. She remains averse to a biopsy at this time and wishes to proceed with 3 month interval scan and follow-up with Dr. Chandler Kelley who she will contact. We did discuss the possibility of SBRT to this area in the future. I discussed the logistics of planning and delivering radiation treatments along with anticipated potential acute and chronic toxicities including but not limited to: Fatigue, skin erythema, desquamation, radiation pneumonitis, radiation fibrosis, bronchial stricture, atelectasis, radiation esophagitis, esophageal stricture, pericarditis, pericardial effusion, spinal cord myelopathy, intercostal neuropathy with chronic pain, rib fracture, risk of secondary malignancy. Questions answered to patient's satisfaction. CT scan has been ordered in early December 2020 and pt plans to follow with CTS after to reconsider biopsy vs other options. We have not made a return appointment but will remain available should she proceed with biopsy or have further testing which increases the potential for this being a malignant lesion. Thank-you for allowing me to participate in the care of this patient and please do not hesitate to contact me for any questions or concerns. TIME SPENT: >60 minutes spent during this consultation,  >50% spent in counseling/discussion/education.       Electronically signed by Electronically signed by Sandra Villa MD on 9/17/2020 at 11:16 AM

## 2020-09-17 NOTE — PROGRESS NOTES
Jose Seaynce  9/17/2020    CONSULT / Right Lung Mass    Vitals:    09/17/20 0843   BP: 119/79   Pulse: 82   Resp: 16   Temp: 96.6 °F (35.9 °C)   SpO2: 97%        Wt Readings from Last 3 Encounters:   09/17/20 89 lb 3.2 oz (40.5 kg)   09/09/20 88 lb 3.2 oz (40 kg)   06/15/20 95 lb (43.1 kg)        Pain Assessment  Pain Assessment: 0-10  Pain Level: 4  Patient's Stated Pain Goal: No pain  Pain Type: Chronic pain  Pain Location: Back  Pain Orientation: Lower  Pain Descriptors: Aching  Pain Frequency: Continuous  Pain Onset: On-going  Clinical Progression: Not changed  Functional Pain Assessment: Prevents or interferes some active activities and ADLs  Non-Pharmaceutical Pain Intervention(s): Repositioned, Rest  Response to Pain Intervention: None  Multiple Pain Sites: No  OTC Analgesics- Aleve PRN    Fall Risk:    Fall risk  Shortness of Breath, Assist with Transfer/Ambulation, Provide Wheelchair PRN, Educate on Assistive Devices and Re-Evaluate Weekly    Nutritional Alteration:  None  Mild Dysphagia- mild difficulty with liquids and fine foods at times- eats without difficulty  Voices Sufficient Oral Intake    Mediport: no    Pacemaker/ICD: no    Implants: no    Previous XRT: no    Assessment: Pt and son here to discuss radiation treatment options with Dr. Elizabeth Garcia. Pt refusing chemo and right lung mass biopsy at this time. Pt reports shortness of breath with minimal exertion, but recovers with rest. Moist sounding cough noted, states productive of clear-white phlegm. Pt complain lower back pain, states is chronic but more intense past week, takes Aleve OTC for pain.      Past Medical History:   Diagnosis Date    Chronic kidney disease     poss kidney stones    [facility]cere[facility] accident) ([facility] [address] 2012    Hyperlipidemia     Lung collapse     Pneumonia     emphysema, COPD       Past Surgical History:   Procedure Laterality Date    BREAST SURGERY      double mastectomy (fibroid) after several lumpectomies    CHOLECYSTECTOMY      COLONOSCOPY  07/2014    FRACTURE SURGERY      HERNIA REPAIR      LUNG BIOPSY Left 01/02/2014    Left upper lobe mass    LUNG SURGERY Left 02/27/2014    Left peunomectomy with lymph node sampling    LUNG SURGERY  2001    MEDIASTINOSCOPY  02/14/2014    bx lymph nodes    TONSILLECTOMY         Allergies   Allergen Reactions    Erythromycin Other (See Comments)     Was rushed to hospital, heart palpitations      Macrolides And Ketolides     Tape [Adhesive Tape]      blisters    Codeine Nausea And Vomiting    Vicodin [Hydrocodone-Acetaminophen] Nausea And Vomiting          Current Outpatient Medications:     Naproxen Sodium (ALEVE) 220 MG CAPS, Take 1 capsule by mouth every 6 hours as needed for Pain, Disp: , Rfl:     albuterol (ACCUNEB) 1.25 MG/3ML nebulizer solution, Inhale 1 ampule into the lungs every 6 hours as needed for Wheezing, Disp: , Rfl:     ipratropium (ATROVENT) 0.02 % nebulizer solution, Take 2.5 mLs by nebulization 4 times daily, Disp: 2.5 mL, Rfl: 5    albuterol (PROVENTIL HFA) 108 (90 BASE) MCG/ACT inhaler, Inhale 2 puffs into the lungs every 6 hours as needed for Wheezing., Disp: 1 Inhaler, Rfl: 3    ADDITIONAL COMMENTS: Pt's son at bedside.     Electronically signed by Yamel Rausch RN on 9/17/2020 at 8:56 AM

## 2020-10-08 ENCOUNTER — ANESTHESIA EVENT (OUTPATIENT)
Dept: ENDOSCOPY | Age: 55
End: 2020-10-08
Payer: MEDICARE

## 2020-10-08 NOTE — ANESTHESIA PRE PROCEDURE
Department of Anesthesiology  Preprocedure Note       Name:  Carissa Fitzgerald   Age:  63 y.o.  :  1965                                          MRN:  [id number]         Date:  10/8/2020      Surgeon: Kira Galvan):  Yuan Woodard MD    Procedure: Procedure(s):  BRONCHOSCOPY NAVIGATIONAL    Medications prior to admission:   Prior to Admission medications    Medication Sig Start Date End Date Taking? Authorizing Provider   Naproxen Sodium (ALEVE) 220 MG CAPS Take 1 capsule by mouth every 6 hours as needed for Pain    Historical Provider, MD   albuterol (ACCUNEB) 1.25 MG/3ML nebulizer solution Inhale 1 ampule into the lungs every 6 hours as needed for Wheezing    Historical Provider, MD   ipratropium (ATROVENT) 0.02 % nebulizer solution Take 2.5 mLs by nebulization 4 times daily 10/18/17   Ronaldo Cervantes MD   albuterol (PROVENTIL HFA) 108 (90 BASE) MCG/ACT inhaler Inhale 2 puffs into the lungs every 6 hours as needed for Wheezing. 13   Anoop Friedman MD       Current medications:    Current Outpatient Medications   Medication Sig Dispense Refill    Naproxen Sodium (ALEVE) 220 MG CAPS Take 1 capsule by mouth every 6 hours as needed for Pain      albuterol (ACCUNEB) 1.25 MG/3ML nebulizer solution Inhale 1 ampule into the lungs every 6 hours as needed for Wheezing      ipratropium (ATROVENT) 0.02 % nebulizer solution Take 2.5 mLs by nebulization 4 times daily 2.5 mL 5    albuterol (PROVENTIL HFA) 108 (90 BASE) MCG/ACT inhaler Inhale 2 puffs into the lungs every 6 hours as needed for Wheezing. 1 Inhaler 3     No current facility-administered medications for this encounter. Allergies:     Allergies   Allergen Reactions    Erythromycin Other (See Comments)     Was rushed to hospital, heart palpitations      Macrolides And Ketolides     Tape Vergara Guarneri Tape]      blisters    Codeine Nausea And Vomiting    Vicodin [Hydrocodone-Acetaminophen] Nausea And Vomiting       Problem List: Patient Active Problem List   Diagnosis Code    Syncope R55    Tobacco use disorder F17.200    Hyperlipidemia E78.5    Hemiparesis ([facility] [address] G81.90    Malignant neoplasm of upper lobe bronchus, left ([facility] [address] C34.12    Pulmonary nodule R91.1       Past Medical History:        Diagnosis Date    Chronic kidney disease     poss kidney stones    CVA (cerebral vascular accident) ([facility] [address] 2012    Hyperlipidemia     Lung collapse     Pneumonia     emphysema, COPD       Past Surgical History:        Procedure Laterality Date    BREAST SURGERY      double mastectomy (fibroid) after several lumpectomies    CHOLECYSTECTOMY      COLONOSCOPY  07/2014    FRACTURE SURGERY      HERNIA REPAIR      LUNG BIOPSY Left 01/02/2014    Left upper lobe mass    LUNG SURGERY Left 02/27/2014    Left peunomectomy with lymph node sampling    LUNG SURGERY  2001    MEDIASTINOSCOPY  02/14/2014    bx lymph nodes    TONSILLECTOMY         Social History:    Social History     Tobacco Use    Smoking status: Current Every Day Smoker     Packs/day: 0.25     Years: 43.00     Pack years: 10.75     Types: Cigarettes     Start date: 9/9/1977    Smokeless tobacco: Never Used   Substance Use Topics    Alcohol use: Yes     Comment: 2 drinks a month                                Ready to quit: Not Answered  Counseling given: Not Answered      Vital Signs (Current): There were no vitals filed for this visit. BP Readings from Last 3 Encounters:   10/05/20 (!) 140/86   09/17/20 119/79   09/09/20 134/85       NPO Status:                                                                                 BMI:   Wt Readings from Last 3 Encounters:   10/05/20 90 lb (40.8 kg)   09/17/20 89 lb 3.2 oz (40.5 kg)   09/09/20 88 lb 3.2 oz (40 kg)     There is no height or weight on file to calculate BMI.       CBC  Lab Results   Component Value Date/Time    WBC 4.7 10/12/2020 08:18 AM    HGB 15.4 10/12/2020 08:18 AM    HCT flat         COVID 19 screening  Denies recent fever or known COVID 19 exposure. Instructed to quarantine until surgery and report any new respiratory or fever symptoms to surgeon. Aware COVID testing must be completed before DOS. COVID swab:   10/12/2020      CT chest 9/2020  Slowly enlarging nodule right middle lobe abutting the minor fissure superiorly suspicious for primary or secondary neoplasm as was noted on   recent PET scan.  Patient has undergone left pneumonectomy. Cardiovascular:                   ROS comment: CP:  NO   Exercise: NO     EKG: 10/12/2020  Normal sinus rhythm with sinus arrhythmia   Lateral infarct , age undetermined (cited 2013)     Neuro/Psych:   (+) CVA (2012):, TIA (6/2012), psychiatric history (smokes marijuana, no medical marijuana card ):            GI/Hepatic/Renal:   (+) GERD:, renal disease: kidney stones,          ROS comment: Cholecystectomy 1985 . Endo/Other:    (+) DiabetesType II DM, , malignancy/cancer. Pt had PAT visit. ROS comment: Breast lumpectomy for mult. bilat. Benign lumpectomies; s/p prophylactic mastectomy, 2005       Abdominal hernia repair 2007  Abdominal:           Vascular:                                  Anesthesia Plan        Carissa Wu, APRN - CNP Chart reviewed, pt. Interviewed and examined. Anesthesia Evaluation will follow by a certified practitioner prior to surgery.   10/8/2020

## 2020-10-12 ENCOUNTER — HOSPITAL ENCOUNTER (OUTPATIENT)
Dept: PREADMISSION TESTING | Age: 55
Discharge: HOME | End: 2020-10-16
Payer: MEDICARE

## 2020-10-12 VITALS
TEMPERATURE: 97.1 F | OXYGEN SATURATION: 98 % | HEART RATE: 99 BPM | WEIGHT: 93 LBS | BODY MASS INDEX: 18.26 KG/M2 | SYSTOLIC BLOOD PRESSURE: 128 MMHG | RESPIRATION RATE: 20 BRPM | HEIGHT: 60 IN | DIASTOLIC BLOOD PRESSURE: 76 MMHG

## 2020-10-12 LAB
ANION GAP SERPL CALCULATED.3IONS-SCNC: 13 MMOL/L (ref 4–16)
APTT: 28.7 SECONDS (ref 25.1–37.1)
BASOPHILS ABSOLUTE: 0 K/CU MM
BASOPHILS RELATIVE PERCENT: 0.4 % (ref 0–1)
BUN BLDV-MCNC: 17 MG/DL (ref 6–23)
CALCIUM SERPL-MCNC: 9.6 MG/DL (ref 8.3–10.6)
CHLORIDE BLD-SCNC: 102 MMOL/L (ref 99–110)
CO2: 25 MMOL/L (ref 21–32)
CREAT SERPL-MCNC: 0.8 MG/DL (ref 0.6–1.1)
DIFFERENTIAL TYPE: ABNORMAL
EKG ATRIAL RATE: 64 BPM
EKG DIAGNOSIS: NORMAL
EKG P AXIS: 29 DEGREES
EKG P-R INTERVAL: 126 MS
EKG Q-T INTERVAL: 392 MS
EKG QRS DURATION: 84 MS
EKG QTC CALCULATION (BAZETT): 404 MS
EKG R AXIS: 106 DEGREES
EKG T AXIS: 91 DEGREES
EKG VENTRICULAR RATE: 64 BPM
EOSINOPHILS ABSOLUTE: 0.1 K/CU MM
EOSINOPHILS RELATIVE PERCENT: 1.1 % (ref 0–3)
GFR AFRICAN AMERICAN: >60 ML/MIN/1.73M2
GFR NON-AFRICAN AMERICAN: >60 ML/MIN/1.73M2
GLUCOSE BLD-MCNC: 177 MG/DL (ref 70–99)
HCT VFR BLD CALC: 47.8 % (ref 37–47)
HEMOGLOBIN: 15.4 GM/DL (ref 12.5–16)
IMMATURE NEUTROPHIL %: 0.2 % (ref 0–0.43)
INR BLD: 0.89 INDEX
LYMPHOCYTES ABSOLUTE: 1.6 K/CU MM
LYMPHOCYTES RELATIVE PERCENT: 33.5 % (ref 24–44)
MCH RBC QN AUTO: 30.3 PG (ref 27–31)
MCHC RBC AUTO-ENTMCNC: 32.2 % (ref 32–36)
MCV RBC AUTO: 94.1 FL (ref 78–100)
MONOCYTES ABSOLUTE: 0.2 K/CU MM
MONOCYTES RELATIVE PERCENT: 5.2 % (ref 0–4)
NUCLEATED RBC %: 0 %
PDW BLD-RTO: 13.9 % (ref 11.7–14.9)
PLATELET # BLD: 249 K/CU MM (ref 140–440)
PMV BLD AUTO: 10.5 FL (ref 7.5–11.1)
POTASSIUM SERPL-SCNC: 4.3 MMOL/L (ref 3.5–5.1)
PROTHROMBIN TIME: 10.8 SECONDS (ref 11.7–14.5)
RBC # BLD: 5.08 M/CU MM (ref 4.2–5.4)
SEGMENTED NEUTROPHILS ABSOLUTE COUNT: 2.8 K/CU MM
SEGMENTED NEUTROPHILS RELATIVE PERCENT: 59.6 % (ref 36–66)
SODIUM BLD-SCNC: 140 MMOL/L (ref 135–145)
TOTAL IMMATURE NEUTOROPHIL: 0.01 K/CU MM
TOTAL NUCLEATED RBC: 0 K/CU MM
WBC # BLD: 4.7 K/CU MM (ref 4–10.5)

## 2020-10-12 PROCEDURE — 85025 COMPLETE CBC W/AUTO DIFF WBC: CPT

## 2020-10-12 PROCEDURE — 85610 PROTHROMBIN TIME: CPT

## 2020-10-12 PROCEDURE — 80048 BASIC METABOLIC PNL TOTAL CA: CPT

## 2020-10-12 PROCEDURE — 93010 ELECTROCARDIOGRAM REPORT: CPT | Performed by: INTERNAL MEDICINE

## 2020-10-12 PROCEDURE — 36415 COLL VENOUS BLD VENIPUNCTURE: CPT

## 2020-10-12 PROCEDURE — 85730 THROMBOPLASTIN TIME PARTIAL: CPT

## 2020-10-12 PROCEDURE — U0002 COVID-19 LAB TEST NON-CDC: HCPCS

## 2020-10-12 PROCEDURE — 93005 ELECTROCARDIOGRAM TRACING: CPT | Performed by: THORACIC SURGERY (CARDIOTHORACIC VASCULAR SURGERY)

## 2020-10-12 ASSESSMENT — PAIN DESCRIPTION - DESCRIPTORS: DESCRIPTORS: ACHING;DULL

## 2020-10-12 ASSESSMENT — PAIN SCALES - GENERAL: PAINLEVEL_OUTOF10: 3

## 2020-10-12 ASSESSMENT — PAIN DESCRIPTION - FREQUENCY: FREQUENCY: CONTINUOUS

## 2020-10-12 ASSESSMENT — PAIN DESCRIPTION - PAIN TYPE: TYPE: CHRONIC PAIN

## 2020-10-12 ASSESSMENT — PAIN DESCRIPTION - ORIENTATION: ORIENTATION: RIGHT

## 2020-10-12 ASSESSMENT — ENCOUNTER SYMPTOMS: SHORTNESS OF BREATH: 1

## 2020-10-12 ASSESSMENT — PAIN DESCRIPTION - LOCATION: LOCATION: KNEE;BACK

## 2020-10-12 ASSESSMENT — PAIN DESCRIPTION - PROGRESSION: CLINICAL_PROGRESSION: GRADUALLY WORSENING

## 2020-10-12 ASSESSMENT — PAIN DESCRIPTION - ONSET: ONSET: ON-GOING

## 2020-10-13 LAB
SARS-COV-2: NOT DETECTED
SOURCE: NORMAL

## 2020-10-15 ASSESSMENT — ENCOUNTER SYMPTOMS: SHORTNESS OF BREATH: 1

## 2020-10-15 NOTE — ANESTHESIA PRE PROCEDURE
Department of Anesthesiology  Preprocedure Note       Name:  Jayne Lr   Age:  63 y.o.  :  1965                                          MRN:  [id number]         Date:  10/15/2020      Surgeon: Otto Kline):  Elena Venegas MD    Procedure: Procedure(s):  BRONCHOSCOPY NAVIGATIONAL    Medications prior to admission:   Prior to Admission medications    Medication Sig Start Date End Date Taking? Authorizing Provider   Naproxen Sodium (ALEVE) 220 MG CAPS Take 1 capsule by mouth every 6 hours as needed for Pain    Historical Provider, MD   ipratropium (ATROVENT) 0.02 % nebulizer solution Take 2.5 mLs by nebulization 4 times daily 10/18/17   Tess Farmer MD       Current medications:    No current facility-administered medications for this encounter. Current Outpatient Medications   Medication Sig Dispense Refill    Naproxen Sodium (ALEVE) 220 MG CAPS Take 1 capsule by mouth every 6 hours as needed for Pain      ipratropium (ATROVENT) 0.02 % nebulizer solution Take 2.5 mLs by nebulization 4 times daily 2.5 mL 5       Allergies:     Allergies   Allergen Reactions    Erythromycin Other (See Comments)     Was rushed to hospital, heart palpitations      Macrolides And Ketolides     Tape Don Jersey Tape]      blisters    Codeine Nausea And Vomiting    Vicodin [Hydrocodone-Acetaminophen] Nausea And Vomiting       Problem List:    Patient Active Problem List   Diagnosis Code    Syncope R55    Tobacco use disorder F17.200    Hyperlipidemia E78.5    Hemiparesis (HCC) G81.90    Malignant neoplasm of upper lobe bronchus, left ([address] C34.12    Pulmonary nodule R91.1       Past Medical History:        Diagnosis Date    Acid reflux     Anemia     Asthma     Cerebral artery occlusion with cerebral infarction ([address]     COPD (chronic obstructive pulmonary disease) ([facility] [address]     \"use to see Dr Marla Reyes but do not care for him\"    Full dentures     Gestational diabetes     \"had gestational diabetess \"\"no trouble with sugar since then\"    History of kidney stones     \"passed a kidney stone approx 2016\"\"they said I had 3 stones and know for sure passed one\"    Hyperlipidemia     Lung cancer ([address]     \"had left lung cancer - removed the lung- 2013= tx with chemo treatments- follow with Dr Tata Wells"    Lung nodule     \"one spot on right lung- having bronchoscopy\"( 10/16/2020)    Pneumonia 2018    emphysema, COPD    Pneumothorax     TIA (transient ischemic attack)     \"had TIA in 2012- passed out had weakness right side,affected my memory- took approx 6 months for everything to come back\"       Past Surgical History:        Procedure Laterality Date    BREAST SURGERY  2012    double mastectomy (fibroid) after several lumpectomies    CHOLECYSTECTOMY  1985    COLONOSCOPY  07/2014    HERNIA REPAIR  2010    ventral hernia repair    LUNG BIOPSY Left 01/02/2014    Left upper lobe mass    LUNG SURGERY Left 02/27/2014    Left peunomectomy with lymph node sampling    LUNG SURGERY  2001    MEDIASTINOSCOPY  02/14/2014    bx lymph nodes    TONSILLECTOMY  as a kid       Social History:    Social History     Tobacco Use    Smoking status: Current Every Day Smoker     Packs/day: 0.25     Years: 43.00     Pack years: 10.75     Types: Cigarettes     Start date: 9/9/1977    Smokeless tobacco: Never Used    Tobacco comment: \"the most every smoke was a pack per day \"   Substance Use Topics    Alcohol use: Yes     Comment: 2 drinks a month                                Ready to quit: Not Answered  Counseling given: Not Answered  Comment: \"the most every smoke was a pack per day \"      Vital Signs (Current): There were no vitals filed for this visit.                                            BP Readings from Last 3 Encounters:   10/12/20 128/76   10/05/20 (!) 140/86   09/17/20 119/79       NPO Status:                                                                                 BMI:   Wt Readings from Last 3 Encounters: 10/12/20 93 lb (42.2 kg)   10/05/20 90 lb (40.8 kg)   09/17/20 89 lb 3.2 oz (40.5 kg)     There is no height or weight on file to calculate BMI. CBC  Lab Results   Component Value Date/Time    WBC 4.7 10/12/2020 08:18 AM    HGB 15.4 10/12/2020 08:18 AM    HCT 47.8 (H) 10/12/2020 08:18 AM     10/12/2020 08:18 AM     RENAL  Lab Results   Component Value Date/Time     10/12/2020 08:18 AM    K 4.3 10/12/2020 08:18 AM     10/12/2020 08:18 AM    CO2 25 10/12/2020 08:18 AM    BUN 17 10/12/2020 08:18 AM    CREATININE 0.8 10/12/2020 08:18 AM    GLUCOSE 177 (H) 10/12/2020 08:18 AM     COAGS  Lab Results   Component Value Date/Time    PROTIME 10.8 (L) 10/12/2020 08:18 AM    INR 0.89 10/12/2020 08:18 AM    APTT 28.7 10/12/2020 08:18 AM           COVID-19 Screening (If Applicable):   Lab Results   Component Value Date    COVID19 NOT DETECTED 10/12/2020         Anesthesia Evaluation  Patient summary reviewed  Airway: Mallampati: I  TM distance: >3 FB   Neck ROM: full  Mouth opening: > = 3 FB Dental:    (+) upper dentures and lower dentures      Pulmonary: breath sounds clear to auscultation  (+) pneumonia:  COPD (emphsema):  shortness of breath (walking fast or long distance ):  asthma (no recent flare. med neb several times per week ):                           ROS comment: Left upper lobe non-small cell lung cancer 2013. S/p Mediastinoscopy    S/p  Flexible bronchoscopy, Left thoracotomy, Left pneumonectomy, Mediastinal lymph node dissection  2/2014  Path showed malignancy in the lymph nodes  Followed by Dr. Naresh Barnett [facility] nodule 12/2019.      PFT's 2017  Pre    FEV1  1.01L  Post  FEV1, 1.18, 53% of predicted       PET 6/2020  Status post left pneumonectomy.  A right middle lobe pulmonary nodule is slightly increased in size as compared to the prior examination.  While its metabolic activity is low level, the finding is suspicious for metastatic focus given its growth and given its small size.

## 2020-10-16 ENCOUNTER — HOSPITAL ENCOUNTER (OUTPATIENT)
Age: 55
Setting detail: OUTPATIENT SURGERY
Discharge: HOME | End: 2020-10-16
Attending: THORACIC SURGERY (CARDIOTHORACIC VASCULAR SURGERY) | Admitting: THORACIC SURGERY (CARDIOTHORACIC VASCULAR SURGERY)
Payer: MEDICARE

## 2020-10-16 ENCOUNTER — ANESTHESIA (OUTPATIENT)
Dept: ENDOSCOPY | Age: 55
End: 2020-10-16
Payer: MEDICARE

## 2020-10-16 VITALS
TEMPERATURE: 97.4 F | RESPIRATION RATE: 18 BRPM | BODY MASS INDEX: 18.26 KG/M2 | OXYGEN SATURATION: 96 % | DIASTOLIC BLOOD PRESSURE: 85 MMHG | HEART RATE: 94 BPM | HEIGHT: 60 IN | SYSTOLIC BLOOD PRESSURE: 119 MMHG | WEIGHT: 93 LBS

## 2020-10-16 LAB
AMPHETAMINES: NEGATIVE
BARBITURATE SCREEN URINE: NEGATIVE
BENZODIAZEPINE SCREEN, URINE: NEGATIVE
CANNABINOID SCREEN URINE: ABNORMAL
COCAINE METABOLITE: NEGATIVE
OPIATES, URINE: NEGATIVE
OXYCODONE: NEGATIVE
PHENCYCLIDINE, URINE: NEGATIVE

## 2020-10-16 PROCEDURE — 80307 DRUG TEST PRSMV CHEM ANLYZR: CPT

## 2020-10-16 RX ORDER — SODIUM CHLORIDE, SODIUM LACTATE, POTASSIUM CHLORIDE, CALCIUM CHLORIDE 600; 310; 30; 20 MG/100ML; MG/100ML; MG/100ML; MG/100ML
INJECTION, SOLUTION INTRAVENOUS CONTINUOUS
Status: DISCONTINUED | OUTPATIENT
Start: 2020-10-16 | End: 2020-10-16 | Stop reason: HOSPADM

## 2020-10-16 NOTE — PROGRESS NOTES
The patient's procedure was canceled today due to no access to the device representative who needs to help with setting up the newer system. I did discuss this with the patient apologized to her for this inconvenience. We will plan to reschedule her for Tuesday at noon.

## 2020-10-19 NOTE — PROGRESS NOTES
.Surgery 10/20/20 @ 1200, arrival 1000               1. Do not eat or drink anything within 8 hours of your surgery - unless instructed by your doctor prior to surgery. This includes                   no water, chewing gum or mints. 2. Follow your directions as prescribed by the doctor for your procedure and medications. 3. Check with your Doctor regarding stopping Plavix, Coumadin, Lovenox,Effient,Pradaxa,Xarelto, Fragmin or other blood thinners and                   follow their instructions. 4. Do not smoke, and do not drink any alcoholic beverages 24 hours prior to surgery. This includes NA Beer. 5. You may brush your teeth and gargle the morning of surgery. DO NOT SWALLOW WATER   6. You MUST make arrangements for a responsible adult to take you home after your surgery and be able to check on you every couple                   hours for the day. You will not be allowed to leave alone or drive yourself home. It is strongly suggested someone stay with you the first 24                   hrs. Your surgery will be cancelled if you do not have a ride home. 7. Please wear simple, loose fitting clothing to the hospital.  Liam Otero not bring valuables (money, credit cards, checkbooks, etc.) Do not wear any                   makeup (including no eye makeup) or nail polish on your fingers or toes. 8. DO NOT wear any jewelry or piercings on day of surgery. All body piercing jewelry must be removed. 9. If you have dentures, they will be removed before going to the OR; we will provide you a container. If you wear contact lenses or glasses,                  they will be removed; please bring a case for them. 10. If you  have a Living Will and Durable Power of  for Healthcare, please bring in a copy. 11. Please bring picture ID,  insurance card, paperwork from the doctors office    (H & P, Consent, & card for implantable devices).            12. Take a shower the night before or morning of your procedure, do not apply any lotion, oil or powder. 13. Wear a mask covering your nose & mouth when entering the hospital. Have your covid-19 test performed within 10 days of your                  Surgery (negative on 10/12/20). Quarantine yourself after the test until after your surgery.

## 2020-10-20 ENCOUNTER — APPOINTMENT (OUTPATIENT)
Dept: GENERAL RADIOLOGY | Age: 55
DRG: 181 | End: 2020-10-20
Attending: THORACIC SURGERY (CARDIOTHORACIC VASCULAR SURGERY)
Payer: MEDICARE

## 2020-10-20 ENCOUNTER — ANESTHESIA (OUTPATIENT)
Dept: ENDOSCOPY | Age: 55
DRG: 181 | End: 2020-10-20
Payer: MEDICARE

## 2020-10-20 ENCOUNTER — APPOINTMENT (OUTPATIENT)
Dept: CT IMAGING | Age: 55
DRG: 181 | End: 2020-10-20
Attending: THORACIC SURGERY (CARDIOTHORACIC VASCULAR SURGERY)
Payer: MEDICARE

## 2020-10-20 ENCOUNTER — ANESTHESIA EVENT (OUTPATIENT)
Dept: ENDOSCOPY | Age: 55
DRG: 181 | End: 2020-10-20
Payer: MEDICARE

## 2020-10-20 ENCOUNTER — HOSPITAL ENCOUNTER (INPATIENT)
Age: 55
LOS: 4 days | Discharge: HOME | DRG: 181 | End: 2020-10-24
Attending: THORACIC SURGERY (CARDIOTHORACIC VASCULAR SURGERY) | Admitting: THORACIC SURGERY (CARDIOTHORACIC VASCULAR SURGERY)
Payer: MEDICARE

## 2020-10-20 VITALS
OXYGEN SATURATION: 97 % | DIASTOLIC BLOOD PRESSURE: 129 MMHG | TEMPERATURE: 98.6 F | RESPIRATION RATE: 1 BRPM | SYSTOLIC BLOOD PRESSURE: 155 MMHG

## 2020-10-20 PROBLEM — J95.811 PNEUMOTHORAX AFTER BIOPSY: Status: ACTIVE | Noted: 2020-10-20

## 2020-10-20 PROCEDURE — 2000000000 HC ICU R&B

## 2020-10-20 PROCEDURE — 6360000002 HC RX W HCPCS: Performed by: NURSE ANESTHETIST, CERTIFIED REGISTERED

## 2020-10-20 PROCEDURE — 6370000000 HC RX 637 (ALT 250 FOR IP): Performed by: PHYSICIAN ASSISTANT

## 2020-10-20 PROCEDURE — 3700000001 HC ADD 15 MINUTES (ANESTHESIA): Performed by: THORACIC SURGERY (CARDIOTHORACIC VASCULAR SURGERY)

## 2020-10-20 PROCEDURE — 7100000000 HC PACU RECOVERY - FIRST 15 MIN

## 2020-10-20 PROCEDURE — 88112 CYTOPATH CELL ENHANCE TECH: CPT

## 2020-10-20 PROCEDURE — 31625 BRONCHOSCOPY W/BIOPSY(S): CPT

## 2020-10-20 PROCEDURE — 2580000003 HC RX 258: Performed by: PHYSICIAN ASSISTANT

## 2020-10-20 PROCEDURE — 94640 AIRWAY INHALATION TREATMENT: CPT

## 2020-10-20 PROCEDURE — 88334 PATH CONSLTJ SURG CYTO XM EA: CPT

## 2020-10-20 PROCEDURE — 31627 NAVIGATIONAL BRONCHOSCOPY: CPT

## 2020-10-20 PROCEDURE — 2709999900 HC NON-CHARGEABLE SUPPLY

## 2020-10-20 PROCEDURE — 71045 X-RAY EXAM CHEST 1 VIEW: CPT

## 2020-10-20 PROCEDURE — 2709999900 HC NON-CHARGEABLE SUPPLY: Performed by: THORACIC SURGERY (CARDIOTHORACIC VASCULAR SURGERY)

## 2020-10-20 PROCEDURE — 3700000000 HC ANESTHESIA ATTENDED CARE: Performed by: THORACIC SURGERY (CARDIOTHORACIC VASCULAR SURGERY)

## 2020-10-20 PROCEDURE — 87075 CULTR BACTERIA EXCEPT BLOOD: CPT

## 2020-10-20 PROCEDURE — 76000 FLUOROSCOPY <1 HR PHYS/QHP: CPT

## 2020-10-20 PROCEDURE — 0W9930Z DRAINAGE OF RIGHT PLEURAL CAVITY WITH DRAINAGE DEVICE, PERCUTANEOUS APPROACH: ICD-10-PCS | Performed by: THORACIC SURGERY (CARDIOTHORACIC VASCULAR SURGERY)

## 2020-10-20 PROCEDURE — 2500000003 HC RX 250 WO HCPCS: Performed by: NURSE ANESTHETIST, CERTIFIED REGISTERED

## 2020-10-20 PROCEDURE — 7100000001 HC PACU RECOVERY - ADDTL 15 MIN

## 2020-10-20 PROCEDURE — C1887 CATHETER, GUIDING: HCPCS

## 2020-10-20 PROCEDURE — 6360000002 HC RX W HCPCS: Performed by: PHYSICIAN ASSISTANT

## 2020-10-20 PROCEDURE — 2580000003 HC RX 258: Performed by: ANESTHESIOLOGY

## 2020-10-20 PROCEDURE — 0B9D8ZX DRAINAGE OF RIGHT MIDDLE LUNG LOBE, VIA NATURAL OR ARTIFICIAL OPENING ENDOSCOPIC, DIAGNOSTIC: ICD-10-PCS | Performed by: INTERNAL MEDICINE

## 2020-10-20 PROCEDURE — 7100000001 HC PACU RECOVERY - ADDTL 15 MIN: Performed by: THORACIC SURGERY (CARDIOTHORACIC VASCULAR SURGERY)

## 2020-10-20 PROCEDURE — 32551 INSERTION OF CHEST TUBE: CPT

## 2020-10-20 PROCEDURE — 2720000010 HC SURG SUPPLY STERILE

## 2020-10-20 PROCEDURE — 88173 CYTOPATH EVAL FNA REPORT: CPT

## 2020-10-20 PROCEDURE — 31623 DX BRONCHOSCOPE/BRUSH: CPT

## 2020-10-20 PROCEDURE — 87102 FUNGUS ISOLATION CULTURE: CPT

## 2020-10-20 PROCEDURE — 88172 CYTP DX EVAL FNA 1ST EA SITE: CPT

## 2020-10-20 PROCEDURE — 88305 TISSUE EXAM BY PATHOLOGIST: CPT

## 2020-10-20 PROCEDURE — 87205 SMEAR GRAM STAIN: CPT

## 2020-10-20 PROCEDURE — C1729 CATH, DRAINAGE: HCPCS

## 2020-10-20 PROCEDURE — 7100000000 HC PACU RECOVERY - FIRST 15 MIN: Performed by: THORACIC SURGERY (CARDIOTHORACIC VASCULAR SURGERY)

## 2020-10-20 PROCEDURE — 31645 BRNCHSC W/THER ASPIR 1ST: CPT

## 2020-10-20 PROCEDURE — 88177 CYTP FNA EVAL EA ADDL: CPT

## 2020-10-20 PROCEDURE — 31624 DX BRONCHOSCOPE/LAVAGE: CPT

## 2020-10-20 PROCEDURE — 2500000003 HC RX 250 WO HCPCS

## 2020-10-20 PROCEDURE — 87070 CULTURE OTHR SPECIMN AEROBIC: CPT

## 2020-10-20 PROCEDURE — 6360000002 HC RX W HCPCS

## 2020-10-20 RX ORDER — KETOROLAC TROMETHAMINE 30 MG/ML
30 INJECTION, SOLUTION INTRAMUSCULAR; INTRAVENOUS ONCE
Status: COMPLETED | OUTPATIENT
Start: 2020-10-20 | End: 2020-10-20

## 2020-10-20 RX ORDER — PROPOFOL 10 MG/ML
INJECTION, EMULSION INTRAVENOUS PRN
Status: DISCONTINUED | OUTPATIENT
Start: 2020-10-20 | End: 2020-10-20 | Stop reason: SDUPTHER

## 2020-10-20 RX ORDER — SODIUM CHLORIDE 0.9 % (FLUSH) 0.9 %
10 SYRINGE (ML) INJECTION EVERY 12 HOURS SCHEDULED
Status: DISCONTINUED | OUTPATIENT
Start: 2020-10-20 | End: 2020-10-24 | Stop reason: HOSPADM

## 2020-10-20 RX ORDER — FENTANYL CITRATE 50 UG/ML
25 INJECTION, SOLUTION INTRAMUSCULAR; INTRAVENOUS ONCE
Status: COMPLETED | OUTPATIENT
Start: 2020-10-20 | End: 2020-10-20

## 2020-10-20 RX ORDER — KETOROLAC TROMETHAMINE 30 MG/ML
INJECTION, SOLUTION INTRAMUSCULAR; INTRAVENOUS
Status: COMPLETED
Start: 2020-10-20 | End: 2020-10-20

## 2020-10-20 RX ORDER — ACETAMINOPHEN 325 MG/1
650 TABLET ORAL EVERY 4 HOURS PRN
Status: DISCONTINUED | OUTPATIENT
Start: 2020-10-20 | End: 2020-10-24 | Stop reason: HOSPADM

## 2020-10-20 RX ORDER — FENTANYL CITRATE 50 UG/ML
INJECTION, SOLUTION INTRAMUSCULAR; INTRAVENOUS
Status: COMPLETED
Start: 2020-10-20 | End: 2020-10-20

## 2020-10-20 RX ORDER — SODIUM CHLORIDE 0.9 % (FLUSH) 0.9 %
10 SYRINGE (ML) INJECTION PRN
Status: DISCONTINUED | OUTPATIENT
Start: 2020-10-20 | End: 2020-10-24 | Stop reason: HOSPADM

## 2020-10-20 RX ORDER — ROCURONIUM BROMIDE 10 MG/ML
INJECTION, SOLUTION INTRAVENOUS PRN
Status: DISCONTINUED | OUTPATIENT
Start: 2020-10-20 | End: 2020-10-20 | Stop reason: SDUPTHER

## 2020-10-20 RX ORDER — ONDANSETRON 2 MG/ML
4 INJECTION INTRAMUSCULAR; INTRAVENOUS EVERY 6 HOURS PRN
Status: DISCONTINUED | OUTPATIENT
Start: 2020-10-20 | End: 2020-10-24 | Stop reason: HOSPADM

## 2020-10-20 RX ORDER — DEXAMETHASONE SODIUM PHOSPHATE 4 MG/ML
INJECTION, SOLUTION INTRA-ARTICULAR; INTRALESIONAL; INTRAMUSCULAR; INTRAVENOUS; SOFT TISSUE PRN
Status: DISCONTINUED | OUTPATIENT
Start: 2020-10-20 | End: 2020-10-20 | Stop reason: SDUPTHER

## 2020-10-20 RX ORDER — SODIUM CHLORIDE, SODIUM LACTATE, POTASSIUM CHLORIDE, CALCIUM CHLORIDE 600; 310; 30; 20 MG/100ML; MG/100ML; MG/100ML; MG/100ML
INJECTION, SOLUTION INTRAVENOUS ONCE
Status: COMPLETED | OUTPATIENT
Start: 2020-10-20 | End: 2020-10-20

## 2020-10-20 RX ORDER — LIDOCAINE HYDROCHLORIDE 20 MG/ML
INJECTION, SOLUTION INFILTRATION; PERINEURAL PRN
Status: DISCONTINUED | OUTPATIENT
Start: 2020-10-20 | End: 2020-10-20 | Stop reason: SDUPTHER

## 2020-10-20 RX ORDER — KETOROLAC TROMETHAMINE 30 MG/ML
15 INJECTION, SOLUTION INTRAMUSCULAR; INTRAVENOUS EVERY 6 HOURS
Status: COMPLETED | OUTPATIENT
Start: 2020-10-20 | End: 2020-10-22

## 2020-10-20 RX ORDER — ONDANSETRON 2 MG/ML
INJECTION INTRAMUSCULAR; INTRAVENOUS PRN
Status: DISCONTINUED | OUTPATIENT
Start: 2020-10-20 | End: 2020-10-20 | Stop reason: SDUPTHER

## 2020-10-20 RX ADMIN — KETOROLAC TROMETHAMINE 30 MG: 30 INJECTION, SOLUTION INTRAMUSCULAR; INTRAVENOUS at 14:08

## 2020-10-20 RX ADMIN — SODIUM CHLORIDE, PRESERVATIVE FREE 10 ML: 5 INJECTION INTRAVENOUS at 23:36

## 2020-10-20 RX ADMIN — SODIUM CHLORIDE, POTASSIUM CHLORIDE, SODIUM LACTATE AND CALCIUM CHLORIDE: 600; 310; 30; 20 INJECTION, SOLUTION INTRAVENOUS at 12:08

## 2020-10-20 RX ADMIN — IPRATROPIUM BROMIDE 0.5 MG: 0.5 SOLUTION RESPIRATORY (INHALATION) at 21:46

## 2020-10-20 RX ADMIN — SUGAMMADEX 200 MG: 100 INJECTION, SOLUTION INTRAVENOUS at 13:36

## 2020-10-20 RX ADMIN — ACETAMINOPHEN 650 MG: 325 TABLET ORAL at 21:24

## 2020-10-20 RX ADMIN — ROCURONIUM BROMIDE 10 MG: 10 INJECTION INTRAVENOUS at 13:03

## 2020-10-20 RX ADMIN — KETOROLAC TROMETHAMINE 15 MG: 30 INJECTION, SOLUTION INTRAMUSCULAR; INTRAVENOUS at 23:36

## 2020-10-20 RX ADMIN — FENTANYL CITRATE 25 MCG: 50 INJECTION INTRAMUSCULAR; INTRAVENOUS at 14:18

## 2020-10-20 RX ADMIN — DEXAMETHASONE SODIUM PHOSPHATE 4 MG: 4 INJECTION, SOLUTION INTRAMUSCULAR; INTRAVENOUS at 12:26

## 2020-10-20 RX ADMIN — ROCURONIUM BROMIDE 50 MG: 10 INJECTION INTRAVENOUS at 12:18

## 2020-10-20 RX ADMIN — FENTANYL CITRATE 25 MCG: 50 INJECTION, SOLUTION INTRAMUSCULAR; INTRAVENOUS at 14:18

## 2020-10-20 RX ADMIN — KETOROLAC TROMETHAMINE 15 MG: 30 INJECTION, SOLUTION INTRAMUSCULAR; INTRAVENOUS at 18:56

## 2020-10-20 RX ADMIN — PROPOFOL 100 MG: 10 INJECTION, EMULSION INTRAVENOUS at 12:18

## 2020-10-20 RX ADMIN — LIDOCAINE HYDROCHLORIDE 50 MG: 20 INJECTION, SOLUTION INFILTRATION; PERINEURAL at 12:18

## 2020-10-20 RX ADMIN — ONDANSETRON 4 MG: 2 INJECTION INTRAMUSCULAR; INTRAVENOUS at 13:26

## 2020-10-20 ASSESSMENT — PULMONARY FUNCTION TESTS
PIF_VALUE: 14
PIF_VALUE: 16
PIF_VALUE: 2
PIF_VALUE: 18
PIF_VALUE: 1
PIF_VALUE: 16
PIF_VALUE: 1
PIF_VALUE: 14
PIF_VALUE: 17
PIF_VALUE: 16
PIF_VALUE: 1
PIF_VALUE: 13
PIF_VALUE: 15
PIF_VALUE: 16
PIF_VALUE: 20
PIF_VALUE: 12
PIF_VALUE: 18
PIF_VALUE: 15
PIF_VALUE: 16
PIF_VALUE: 13
PIF_VALUE: 15
PIF_VALUE: 15
PIF_VALUE: 3
PIF_VALUE: 15
PIF_VALUE: 11
PIF_VALUE: 26
PIF_VALUE: 15
PIF_VALUE: 17
PIF_VALUE: 14
PIF_VALUE: 16
PIF_VALUE: 1
PIF_VALUE: 1
PIF_VALUE: 2
PIF_VALUE: 12
PIF_VALUE: 16
PIF_VALUE: 16
PIF_VALUE: 14
PIF_VALUE: 1
PIF_VALUE: 2
PIF_VALUE: 3
PIF_VALUE: 8
PIF_VALUE: 9
PIF_VALUE: 3
PIF_VALUE: 14
PIF_VALUE: 16
PIF_VALUE: 18
PIF_VALUE: 9
PIF_VALUE: 14
PIF_VALUE: 13
PIF_VALUE: 1
PIF_VALUE: 15
PIF_VALUE: 3
PIF_VALUE: 2
PIF_VALUE: 13
PIF_VALUE: 9
PIF_VALUE: 2
PIF_VALUE: 14
PIF_VALUE: 14
PIF_VALUE: 13
PIF_VALUE: 20
PIF_VALUE: 7
PIF_VALUE: 16
PIF_VALUE: 16
PIF_VALUE: 14
PIF_VALUE: 10
PIF_VALUE: 13
PIF_VALUE: 14
PIF_VALUE: 1
PIF_VALUE: 16
PIF_VALUE: 11
PIF_VALUE: 3
PIF_VALUE: 16
PIF_VALUE: 14
PIF_VALUE: 14
PIF_VALUE: 1
PIF_VALUE: 1
PIF_VALUE: 16
PIF_VALUE: 11
PIF_VALUE: 14
PIF_VALUE: 1
PIF_VALUE: 13
PIF_VALUE: 17
PIF_VALUE: 10
PIF_VALUE: 18
PIF_VALUE: 1
PIF_VALUE: 18
PIF_VALUE: 6
PIF_VALUE: 15
PIF_VALUE: 18
PIF_VALUE: 10
PIF_VALUE: 12
PIF_VALUE: 16
PIF_VALUE: 17

## 2020-10-20 ASSESSMENT — PAIN DESCRIPTION - DESCRIPTORS
DESCRIPTORS: SORE
DESCRIPTORS: SORE

## 2020-10-20 ASSESSMENT — PAIN DESCRIPTION - LOCATION
LOCATION: CHEST

## 2020-10-20 ASSESSMENT — PAIN DESCRIPTION - ORIENTATION
ORIENTATION: RIGHT

## 2020-10-20 ASSESSMENT — PAIN DESCRIPTION - ONSET
ONSET: GRADUAL
ONSET: GRADUAL

## 2020-10-20 ASSESSMENT — PAIN SCALES - GENERAL
PAINLEVEL_OUTOF10: 0
PAINLEVEL_OUTOF10: 10
PAINLEVEL_OUTOF10: 5
PAINLEVEL_OUTOF10: 0
PAINLEVEL_OUTOF10: 0
PAINLEVEL_OUTOF10: 7
PAINLEVEL_OUTOF10: 10
PAINLEVEL_OUTOF10: 10
PAINLEVEL_OUTOF10: 0
PAINLEVEL_OUTOF10: 3

## 2020-10-20 ASSESSMENT — PAIN - FUNCTIONAL ASSESSMENT
PAIN_FUNCTIONAL_ASSESSMENT: ACTIVITIES ARE NOT PREVENTED
PAIN_FUNCTIONAL_ASSESSMENT: 0-10
PAIN_FUNCTIONAL_ASSESSMENT: ACTIVITIES ARE NOT PREVENTED

## 2020-10-20 ASSESSMENT — PAIN DESCRIPTION - PROGRESSION
CLINICAL_PROGRESSION: GRADUALLY WORSENING
CLINICAL_PROGRESSION: GRADUALLY WORSENING

## 2020-10-20 ASSESSMENT — PAIN DESCRIPTION - FREQUENCY
FREQUENCY: INTERMITTENT
FREQUENCY: INTERMITTENT

## 2020-10-20 ASSESSMENT — PAIN DESCRIPTION - PAIN TYPE
TYPE: ACUTE PAIN
TYPE: ACUTE PAIN

## 2020-10-20 NOTE — PROGRESS NOTES
1344: Pt extubated, and as she is waking up, pt c/o R upper quadrant pain. Pt moaning, agitated. Dr Chely Fairbanks, and Dr Sammie Bay to room. Pt transported to PACU, Dr Sammie Bay present at bedside. Report given to [facility] in PACU.

## 2020-10-20 NOTE — BRIEF OP NOTE
Brief Postoperative Note      Patient: Sol Vázquez  YOB: 1965  MRN: [id number]    Date of Procedure: 10/20/2020    Pre-Op Diagnosis: lung mass    Post-Op Diagnosis: Same       Procedure(s):  BRONCHOSCOPY ISAAC    Surgeon(s):  Maykel Florence MD    Assistant:  * No surgical staff found *    Anesthesia: General    Estimated Blood Loss (mL): Minimal    Complications: None    Specimens:   * No specimens in log *    Implants:  * No implants in log *      Drains: * No LDAs found *    Findings: [id number]    Electronically signed by Emil Bradley MD on 10/20/2020 at 1:46 PM

## 2020-10-20 NOTE — PROGRESS NOTES
1400-pt arrived from [facility] and placed on all PACU monitoring devices. Pt laying on left side with severe right chest wall pain. Pt SOB RR34. Report received from Replaced by [facility] and [address] [facility]. Right sided breath sounds present. No crepitus. Chest appears to be equal and no tracheal deviation noted. Dr. Sammie Bay at bedside. [id number] radiology at bedside for STAT post op CXR. Russell Celeste remains at bedside. 5875- spoke with radiologist about plan of care. IR to come to PACU to  pt when they are ready for her. Pt laying on left side for comfort. Pain improving still SOB 91%-94%. Russell Celeste attempted to update family via telephone. 7401- returned to bedside to inform pt that she has a pneumothorax and discussed plan of care. Pt voices understanding. Pt stable at this time, awaiting IR.   Sloane EISENBERG here to retreive pt. Bedside report given.

## 2020-10-20 NOTE — H&P
Patient was seen in pre-op. I have reviewed the history and physical.  I have examined the patient today. There are no changes noted from the H & P available in chart. The patient was counseled at length about the risks of aniceto Covid-19 during their perioperative period and any recovery window from their procedure. The patient was made aware that aniceto Covid-19  may worsen their prognosis for recovering from their procedure  and lend to a higher morbidity and/or mortality risk. All material risks, benefits, and reasonable alternatives including postponing the procedure were discussed. The patient does wish to proceed with the procedure at this time.

## 2020-10-20 NOTE — ANESTHESIA PRE PROCEDURE
Department of Anesthesiology  Preprocedure Note       Name:  Lucas Marmolejo   Age:  63 y.o.  :  1965                                          MRN:  [id number]         Date:  10/20/2020      Surgeon: Sunny Santoyo):  Shelley Bell MD    Procedure: Procedure(s):  BRONCHOSCOPY ISAAC    Medications prior to admission:   Prior to Admission medications    Medication Sig Start Date End Date Taking? Authorizing Provider   ipratropium (ATROVENT) 0.02 % nebulizer solution Take 2.5 mLs by nebulization 4 times daily 10/18/17  Yes Gisselle Armstrong MD   Naproxen Sodium (ALEVE) 220 MG CAPS Take 1 capsule by mouth every 6 hours as needed for Pain    Historical Provider, MD       Current medications:    Current Facility-Administered Medications   Medication Dose Route Frequency Provider Last Rate Last Dose    lactated ringers infusion   Intravenous Once Wesley Guajardo MD           Allergies:     Allergies   Allergen Reactions    Erythromycin Other (See Comments)     Was rushed to hospital, heart palpitations      Macrolides And Ketolides     Tape Corry Colder Tape]      blisters    Codeine Nausea And Vomiting    Vicodin [Hydrocodone-Acetaminophen] Nausea And Vomiting       Problem List:    Patient Active Problem List   Diagnosis Code    Syncope R55    Tobacco use disorder F17.200    Hyperlipidemia E78.5    Hemiparesis ([facility] G81.90    Malignant neoplasm of upper lobe bronchus, left ([facility] [address] C34.12    Pulmonary nodule R91.1       Past Medical History:        Diagnosis Date    Acid reflux     Anemia     Asthma     Cerebral artery occlusion with cerebral infarction ([facility] [address]     COPD (chronic obstructive pulmonary disease) ([facility])     \"use to see Dr Humberto Herrera but do not care for him\"    Full dentures     Gestational diabetes     \"had gestational diabetess \"\"no trouble with sugar since then\"    History of kidney stones     \"passed a kidney stone approx \"\"they said I had 3 stones and know for sure passed one\"    Hyperlipidemia     Lung cancer ([facility] [address]     \"had left lung cancer - removed the lung- 2013= tx with chemo treatments- follow with Dr Nirmal Serna"    Lung nodule     \"one spot on right lung- having bronchoscopy\"( 10/16/2020)    Pneumonia 2018    emphysema, COPD    Pneumothorax     TIA (transient ischemic attack)     \"had TIA in 2012- passed out had weakness right side,affected my memory- took approx 6 months for everything to come back\"       Past Surgical History:        Procedure Laterality Date    BREAST SURGERY  2012    double mastectomy (fibroid) after several lumpectomies    CHOLECYSTECTOMY  1985    COLONOSCOPY  07/2014    HERNIA REPAIR  2010    ventral hernia repair    LUNG BIOPSY Left 01/02/2014    Left upper lobe mass    LUNG SURGERY Left 02/27/2014    Left peunomectomy with lymph node sampling    LUNG SURGERY  2001    MEDIASTINOSCOPY  02/14/2014    bx lymph nodes    TONSILLECTOMY  as a kid       Social History:    Social History     Tobacco Use    Smoking status: Current Every Day Smoker     Packs/day: 0.25     Years: 43.00     Pack years: 10.75     Types: Cigarettes     Start date: 9/9/1977    Smokeless tobacco: Never Used    Tobacco comment: \"the most every smoke was a pack per day \"   Substance Use Topics    Alcohol use: Yes     Comment: 2 drinks a month                                Ready to quit: Not Answered  Counseling given: Not Answered  Comment: \"the most every smoke was a pack per day \"      Vital Signs (Current):   Vitals:    10/20/20 1044   BP: 124/71   Pulse: 92   Resp: 18   Temp: 36.3 °C (97.4 °F)   TempSrc: Temporal   SpO2: 99%   Weight: 93 lb (42.2 kg)   Height: 5' (1.524 m)                                              BP Readings from Last 3 Encounters:   10/20/20 124/71   10/16/20 119/85   10/12/20 128/76       NPO Status: Time of last liquid consumption: 0300                        Time of last solid consumption: 2200                        Date of last liquid consumption: 10/20/20                        Date of last solid food consumption: 10/19/20    BMI:   Wt Readings from Last 3 Encounters:   10/20/20 93 lb (42.2 kg)   10/16/20 93 lb (42.2 kg)   10/12/20 93 lb (42.2 kg)     Body mass index is 18.16 kg/m². CBC:   Lab Results   Component Value Date    WBC 4.7 10/12/2020    RBC 5.08 10/12/2020    HGB 15.4 10/12/2020    HCT 47.8 10/12/2020    MCV 94.1 10/12/2020    RDW 13.9 10/12/2020     10/12/2020       CMP:   Lab Results   Component Value Date     10/12/2020    K 4.3 10/12/2020     10/12/2020    CO2 25 10/12/2020    BUN 17 10/12/2020    CREATININE 0.8 10/12/2020    GFRAA >60 10/12/2020    LABGLOM >60 10/12/2020    GLUCOSE 177 10/12/2020    PROT 7.1 05/26/2020    CALCIUM 9.6 10/12/2020    BILITOT 0.5 05/26/2020    ALKPHOS 148 05/26/2020    AST 17 05/26/2020    ALT 18 05/26/2020       POC Tests: No results for input(s): POCGLU, POCNA, POCK, POCCL, POCBUN, POCHEMO, POCHCT in the last 72 hours. Coags:   Lab Results   Component Value Date    PROTIME 10.8 10/12/2020    INR 0.89 10/12/2020    APTT 28.7 10/12/2020       HCG (If Applicable): No results found for: PREGTESTUR, PREGSERUM, HCG, HCGQUANT     ABGs:   Lab Results   Component Value Date    PO2ART 80 12/23/2013    AMF4XFT 31.0 12/23/2013    JQG9CKM 23.1 12/23/2013    BEART 0.4 12/23/2013        Type & Screen (If Applicable):  No results found for: LABABO, [address]    Drug/Infectious Status (If Applicable):  No results found for: HIV, HEPCAB    COVID-19 Screening (If Applicable):   Lab Results   Component Value Date    COVID19 NOT DETECTED 10/12/2020         Anesthesia Evaluation  Patient summary reviewed  Airway: Mallampati: I  TM distance: >3 FB   Neck ROM: full  Mouth opening: > = 3 FB Dental:    (+) lower dentures and upper dentures      Pulmonary:normal exam    (+) COPD:  asthma:                           ROS comment:                     ROS comment: Left upper lobe non-small cell lung cancer 2013.    S/p Mediastinoscopy    S/p  Flexible bronchoscopy, Left thoracotomy, Left pneumonectomy, Mediastinal lymph node dissection  2/2014  Path showed malignancy in the lymph nodes  Followed by Dr. Flex Cordero RML nodule 12/2019. PFT's 2017  Pre    FEV1  1.01L  Post  FEV1, 1.18, 53% of predicted       PET 6/2020  Status post left pneumonectomy.  A right middle lobe pulmonary nodule is slightly increased in size as compared to the prior examination.  While its metabolic activity is low level, the finding is suspicious for metastatic focus given its growth and given its small size.       No findings of FDG avid metastatic disease within the abdomen or pelvis. Left pleurodesis, 1995  Former smoker    Left pneumothorax x 3  spontaneous, with bleb removal in 2005       smoker  Counseled on smoking cessation. PAT Airway. Limited exam. COVID 19 precautions. Patient wearing mask  Head/Neck movement: limited to flex backward   History of difficult intubation:  None  Teeth: U/L dentures   Able to lie flat                    COVID 19 screening  Denies recent fever or known COVID 19 exposure. Instructed to quarantine until surgery and report any new respiratory or fever symptoms to surgeon. Aware COVID testing must be completed before DOS. COVID swab:   10/12/2020      CT chest 9/2020  Slowly enlarging nodule right middle lobe abutting the minor fissure superiorly suspicious for primary or secondary neoplasm as was noted on   recent PET scan.  Patient has undergone left pneumonectomy. Smoker - 10.75 pack years    Cardiovascular:  Exercise tolerance: poor (<4 METS),   (+) hyperlipidemia                  Neuro/Psych:   (+) CVA:, TIA,             GI/Hepatic/Renal:   (+) GERD:,           Endo/Other: Negative Endo/Other ROS                    Abdominal:           Vascular: negative vascular ROS. Anesthesia Plan      general     ASA 4       Induction: intravenous.       Anesthetic plan and risks discussed with patient. DIMITRI Monroe CRNA   10/20/2020      Pre Anesthesia Evaluation complete. Anesthesia plan, risks, benefits, alternatives, and personnel discussed with patient and/or legal guardian. Patient and/or legal guardian verbalized an understanding and agreed to proceed. Anesthesia plan discussed with care team members and agreed upon.   DIMITRI Monroe CRNA  10/20/2020

## 2020-10-20 NOTE — PROGRESS NOTES
Bronch:  Assisted Dr. Jennifer Becker with bronchoscopy. Scope used # [id number]. Prepared patient for Navigational bronchoscopy. See Physicians note for details.

## 2020-10-20 NOTE — PROGRESS NOTES
Patient arrived to room 2127 at 16:38 transported via bed with [facility] from [employer]. Patient awake, alert sitting in bed. Patient ambulated to bed, bed in lowest position, warm blankets applied and call light in patient lap. Vital signs HR- 85 (NSR),  BP- 109/70 (83), Temp - 98.3, RR- 19, SpO2- 96 % on 2 liters nasal canula. Chest tube dressing clean dry intact, chest tube hooked up to low continuous wall suction with small air leak. Patient has right forearm IV infusion completed lactated ringers.

## 2020-10-20 NOTE — PROGRESS NOTES
The patient arrived to CT for placement of a right chest tube. The patient verbalizes understanding of the procedure, consent confirmed with the  and placed in the patients soft chart. The patient is prepped and draped in supine position on the table. Dr Dougie Dillon performing the procedure.     Donato Montoya 442  Procedure started  7846  Procedure finished      Sterile dressing applied by CT tech  Report given to RN

## 2020-10-20 NOTE — PLAN OF CARE
Problem: Airway Clearance - Ineffective:  Goal: Ability to maintain a clear airway will improve  Description: Ability to maintain a clear airway will improve  Outcome: Ongoing     Problem: Infection:  Goal: Will remain free from infection  Description: Will remain free from infection  Outcome: Ongoing     Problem: Safety:  Goal: Free from accidental physical injury  Description: Free from accidental physical injury  Outcome: Ongoing  Goal: Free from intentional harm  Description: Free from intentional harm  Outcome: Ongoing     Problem: Daily Care:  Goal: Daily care needs are met  Description: Daily care needs are met  Outcome: Ongoing     Problem: Pain:  Goal: Patient's pain/discomfort is manageable  Description: Patient's pain/discomfort is manageable  Outcome: Ongoing     Problem: Skin Integrity:  Goal: Skin integrity will stabilize  Description: Skin integrity will stabilize  Outcome: Ongoing     Problem: Discharge Planning:  Goal: Patients continuum of care needs are met  Description: Patients continuum of care needs are met  Outcome: Ongoing

## 2020-10-20 NOTE — PROGRESS NOTES
After the patient was extubated in the operating room she had severe right-sided pain. We brought her over to PACU as she has been hemodynamically stable however severe pain. X-ray was performed stat which showed a questionable lateral pneumothorax. I read this with the interventional radiology physician. The lung apex is up. Both myself and the PACU nurse have listen to the lungs and she has complete expansion of the right lung by exam.    The patient will be taken down urgently to CT scan for a CT scan of the chest.  If there is a pneumothorax interventional radiology will place a CT-guided chest tube. I have explained all this to the patient.

## 2020-10-21 ENCOUNTER — APPOINTMENT (OUTPATIENT)
Dept: GENERAL RADIOLOGY | Age: 55
DRG: 181 | End: 2020-10-21
Attending: THORACIC SURGERY (CARDIOTHORACIC VASCULAR SURGERY)
Payer: MEDICARE

## 2020-10-21 LAB
ANION GAP SERPL CALCULATED.3IONS-SCNC: 10 MMOL/L (ref 4–16)
BUN BLDV-MCNC: 16 MG/DL (ref 6–23)
CALCIUM SERPL-MCNC: 9 MG/DL (ref 8.3–10.6)
CHLORIDE BLD-SCNC: 103 MMOL/L (ref 99–110)
CO2: 24 MMOL/L (ref 21–32)
CREAT SERPL-MCNC: 0.7 MG/DL (ref 0.6–1.1)
GFR AFRICAN AMERICAN: >60 ML/MIN/1.73M2
GFR NON-AFRICAN AMERICAN: >60 ML/MIN/1.73M2
GLUCOSE BLD-MCNC: 93 MG/DL (ref 70–99)
HCT VFR BLD CALC: 41.3 % (ref 37–47)
HEMOGLOBIN: 13.7 GM/DL (ref 12.5–16)
MAGNESIUM: 2.1 MG/DL (ref 1.8–2.4)
MCH RBC QN AUTO: 31.5 PG (ref 27–31)
MCHC RBC AUTO-ENTMCNC: 33.2 % (ref 32–36)
MCV RBC AUTO: 94.9 FL (ref 78–100)
PDW BLD-RTO: 13.9 % (ref 11.7–14.9)
PLATELET # BLD: 207 K/CU MM (ref 140–440)
PMV BLD AUTO: 10.1 FL (ref 7.5–11.1)
POTASSIUM SERPL-SCNC: 4.1 MMOL/L (ref 3.5–5.1)
RBC # BLD: 4.35 M/CU MM (ref 4.2–5.4)
SODIUM BLD-SCNC: 137 MMOL/L (ref 135–145)
WBC # BLD: 10.1 K/CU MM (ref 4–10.5)

## 2020-10-21 PROCEDURE — 6360000002 HC RX W HCPCS: Performed by: PHYSICIAN ASSISTANT

## 2020-10-21 PROCEDURE — 94761 N-INVAS EAR/PLS OXIMETRY MLT: CPT

## 2020-10-21 PROCEDURE — 2700000000 HC OXYGEN THERAPY PER DAY

## 2020-10-21 PROCEDURE — 6370000000 HC RX 637 (ALT 250 FOR IP): Performed by: PHYSICIAN ASSISTANT

## 2020-10-21 PROCEDURE — 85027 COMPLETE CBC AUTOMATED: CPT

## 2020-10-21 PROCEDURE — 2580000003 HC RX 258: Performed by: PHYSICIAN ASSISTANT

## 2020-10-21 PROCEDURE — 83735 ASSAY OF MAGNESIUM: CPT

## 2020-10-21 PROCEDURE — 94640 AIRWAY INHALATION TREATMENT: CPT

## 2020-10-21 PROCEDURE — 80048 BASIC METABOLIC PNL TOTAL CA: CPT

## 2020-10-21 PROCEDURE — 2000000000 HC ICU R&B

## 2020-10-21 PROCEDURE — 94150 VITAL CAPACITY TEST: CPT

## 2020-10-21 PROCEDURE — 71045 X-RAY EXAM CHEST 1 VIEW: CPT

## 2020-10-21 PROCEDURE — 94664 DEMO&/EVAL PT USE INHALER: CPT

## 2020-10-21 RX ADMIN — KETOROLAC TROMETHAMINE 15 MG: 30 INJECTION, SOLUTION INTRAMUSCULAR; INTRAVENOUS at 18:20

## 2020-10-21 RX ADMIN — SODIUM CHLORIDE, PRESERVATIVE FREE 10 ML: 5 INJECTION INTRAVENOUS at 07:08

## 2020-10-21 RX ADMIN — SODIUM CHLORIDE, PRESERVATIVE FREE 10 ML: 5 INJECTION INTRAVENOUS at 11:09

## 2020-10-21 RX ADMIN — IPRATROPIUM BROMIDE 0.5 MG: 0.5 SOLUTION RESPIRATORY (INHALATION) at 12:33

## 2020-10-21 RX ADMIN — KETOROLAC TROMETHAMINE 15 MG: 30 INJECTION, SOLUTION INTRAMUSCULAR; INTRAVENOUS at 07:07

## 2020-10-21 RX ADMIN — IPRATROPIUM BROMIDE 0.5 MG: 0.5 SOLUTION RESPIRATORY (INHALATION) at 16:40

## 2020-10-21 RX ADMIN — IPRATROPIUM BROMIDE 0.5 MG: 0.5 SOLUTION RESPIRATORY (INHALATION) at 08:09

## 2020-10-21 RX ADMIN — KETOROLAC TROMETHAMINE 15 MG: 30 INJECTION, SOLUTION INTRAMUSCULAR; INTRAVENOUS at 12:04

## 2020-10-21 RX ADMIN — ACETAMINOPHEN 650 MG: 325 TABLET ORAL at 03:21

## 2020-10-21 ASSESSMENT — PAIN DESCRIPTION - ORIENTATION
ORIENTATION: RIGHT

## 2020-10-21 ASSESSMENT — PAIN DESCRIPTION - PROGRESSION
CLINICAL_PROGRESSION: GRADUALLY WORSENING

## 2020-10-21 ASSESSMENT — PAIN - FUNCTIONAL ASSESSMENT
PAIN_FUNCTIONAL_ASSESSMENT: ACTIVITIES ARE NOT PREVENTED

## 2020-10-21 ASSESSMENT — PAIN SCALES - GENERAL
PAINLEVEL_OUTOF10: 7
PAINLEVEL_OUTOF10: 0
PAINLEVEL_OUTOF10: 5
PAINLEVEL_OUTOF10: 0
PAINLEVEL_OUTOF10: 0
PAINLEVEL_OUTOF10: 3
PAINLEVEL_OUTOF10: 3
PAINLEVEL_OUTOF10: 0
PAINLEVEL_OUTOF10: 0
PAINLEVEL_OUTOF10: 5

## 2020-10-21 ASSESSMENT — PAIN DESCRIPTION - LOCATION
LOCATION: CHEST
LOCATION: CHEST
LOCATION: NECK;CHEST

## 2020-10-21 ASSESSMENT — PAIN DESCRIPTION - PAIN TYPE
TYPE: SURGICAL PAIN
TYPE: SURGICAL PAIN
TYPE: ACUTE PAIN

## 2020-10-21 ASSESSMENT — PAIN DESCRIPTION - DESCRIPTORS
DESCRIPTORS: DISCOMFORT
DESCRIPTORS: SORE;DISCOMFORT
DESCRIPTORS: SORE;ACHING

## 2020-10-21 ASSESSMENT — PAIN DESCRIPTION - ONSET
ONSET: GRADUAL

## 2020-10-21 ASSESSMENT — PAIN DESCRIPTION - FREQUENCY
FREQUENCY: INTERMITTENT

## 2020-10-21 NOTE — PROGRESS NOTES
PATIENT NAME: Holly Walsh    TODAY'S DATE: 10/21/20    Reason for today's visit: post procedural PTX    SUBJECTIVE:    Pt is feeling well. Her pain is improved. She has minimal SOB at rest, but when she walks off suction she does feel SOB. OBJECTIVE:   VITALS:    Vitals:    10/21/20 1100   BP: 122/89   Pulse: 88   Resp: 29   Temp:    SpO2: 93%     INTAKE/OUTPUT:    Date 10/21/20 0000 - 10/21/20 2359   Shift 1528-0598 1831-4327 7186-5769 24 Hour Total   INTAKE   P.O.  360  360   Shift Total(mL/kg)  360(8.5)  360(8.5)   OUTPUT   Urine(mL/kg/hr)  300  300   Chest Tube 0 0  0   Shift Total(mL/kg) 0(0) 300(7.1)  300(7.1)   Weight (kg) 42.2 42.2 42.2 42.2      Patient Vitals for the past 96 hrs (Last 3 readings):   Weight   10/20/20 1044 93 lb (42.2 kg)       EXAM:  Constitutional: Blood pressure 122/89, pulse 88, temperature 98 °F (36.7 °C), temperature source Oral, resp. rate 29, height 5' (1.524 m), weight 93 lb (42.2 kg), last menstrual period 01/12/2010, SpO2 93 %, not currently breastfeeding. No apparent distress, appears stated age and cooperative. Neurologic: follows commands, no focal weakness noted   Lungs: Good respiratory effort. Clear to auscultation, CT with small intermittent air leak  CV: Regular rate/ rhythm , no peripheral edema, feet warm and well perfused  GI: Soft, non-tender in all four quadrants, non-distended, + bowel sounds, spleen and liver no palpable masses   : bladder nondistended   MSK: no obvious deformity   Skin: warm, pink and dry       Data:  CBC:   Recent Labs     10/21/20  0730   WBC 10.1   HGB 13.7   HCT 41.3        BMP:    Recent Labs     10/21/20  0730      K 4.1      CO2 24   BUN 16   CREATININE 0.7   GLUCOSE 93     Hepatic: No results for input(s): AST, ALT, ALB, BILITOT, ALKPHOS in the last 72 hours. Mag:      Recent Labs     10/21/20  0730   MG 2.1      Phos:   No results for input(s): PHOS in the last 72 hours.    INR: No results for input(s): INR in the last 72 hours. Radiology Review:  CXR independently reviewed - tiny apical PTX      ASSESSMENT:  Patient Active Problem List   Diagnosis    Syncope    Tobacco use disorder    Hyperlipidemia    [facility] ([facility] [address]    Malignant neoplasm of upper lobe bronchus, left ([facility])    Pulmonary nodule    Pneumothorax after biopsy       R post procedural PTX    PLAN:     Continue CT to suction today. CT to UWS at 4 am tomorrow and CXR at 7. Will hopefully be able to DC tomorrow with or without the CT in place.      Catalino Hill PA-C

## 2020-10-21 NOTE — CARE COORDINATION
Chart reviewed. Patient is from home; independent prior to admission. She has a PCP and insurance that assists with Rx when needed. No needs identified at this time. CM will remain available should needs arise. Cara Jerry RN    7882 Met with patient at bedside. She is awake and able to participate. Patient stated that she is from home; very independent. She stated that she is happy with her PCP and denies needs upon discharge. Plan is home as soon as chest tube is out - possibly 10/22.  Cara Jerry RN

## 2020-10-21 NOTE — OP NOTE
[address], [facility]                                OPERATIVE REPORT    PATIENT NAME: Tess Resendiz                      :        1965  MED REC NO:   [id number]                          ROOM:       2127  ACCOUNT NO:   [de-identified]                           ADMIT DATE: 10/20/2020  PROVIDER:     Rubens Sainz MD    DATE OF PROCEDURE:  10/20/2020    PRE-PROCEDURE DIAGNOSIS:  Right middle lobe lung lesion. POST-PROCEDURE DIAGNOSIS:  Right middle lobe lung lesion. PROCEDURES PERFORMED:  1. Diagnostic bronchoscopy. 2.  Navigational bronchoscopy to right middle lobe lung lesion with  brush biopsy, needle biopsy and BAL. 3.  Radial probe endobronchial ultrasound. SURGEON:  Rubens Sainz MD    ESTIMATED BLOOD LOSS:  Less than 10 mL. PREOP:  This is a 74-year-old female who in  had a left  pneumonectomy at [facility] for T3N0 adenocarcinoma. She did  well postoperatively. Has been followed by Hematology; however, they  found a lesion in the right middle lobe. We looked at it in 2020 and  the patient wanted to wait for further sizing prior to proceeding with  biopsy. She has returned in three-month followup and the lesion looks  larger. PET scan shows slight uptake in this region. It is still less  than 1 cm. We did discuss that biopsy is necessary at this point as it  has grown and is PET avid with her history. We discussed both  percutaneous and bronchial biopsy. We are concerned of percutaneous  biopsy as it is very close to the fissure in the middle lobe and if she  has a pneumothorax, this would be severely detrimental due to her  underlying left pneumonectomy. At this point, the patient agreed to  proceed with biopsy. DESCRIPTION OF PROCEDURE:  The patient was identified, brought to the  endoscopy suite, laid in supine position. She was intubated by  Anesthesia.   Time-out was

## 2020-10-21 NOTE — PROGRESS NOTES
Patient ambulated to restroom, patient having shortness of breathe and some anxiety. Patient sat in chair for one hour and wanted to go back to bed. Spoke with Arie HATCH new verbal order to keep patient to suction when ambulating to restroom or to chair.

## 2020-10-22 ENCOUNTER — APPOINTMENT (OUTPATIENT)
Dept: GENERAL RADIOLOGY | Age: 55
DRG: 181 | End: 2020-10-22
Attending: THORACIC SURGERY (CARDIOTHORACIC VASCULAR SURGERY)
Payer: MEDICARE

## 2020-10-22 PROCEDURE — 94761 N-INVAS EAR/PLS OXIMETRY MLT: CPT

## 2020-10-22 PROCEDURE — 2580000003 HC RX 258: Performed by: PHYSICIAN ASSISTANT

## 2020-10-22 PROCEDURE — 71045 X-RAY EXAM CHEST 1 VIEW: CPT

## 2020-10-22 PROCEDURE — 94640 AIRWAY INHALATION TREATMENT: CPT

## 2020-10-22 PROCEDURE — 94150 VITAL CAPACITY TEST: CPT

## 2020-10-22 PROCEDURE — 2700000000 HC OXYGEN THERAPY PER DAY

## 2020-10-22 PROCEDURE — 2000000000 HC ICU R&B

## 2020-10-22 PROCEDURE — 6360000002 HC RX W HCPCS: Performed by: PHYSICIAN ASSISTANT

## 2020-10-22 RX ORDER — KETOROLAC TROMETHAMINE 30 MG/ML
15 INJECTION, SOLUTION INTRAMUSCULAR; INTRAVENOUS EVERY 6 HOURS PRN
Status: DISCONTINUED | OUTPATIENT
Start: 2020-10-22 | End: 2020-10-24 | Stop reason: HOSPADM

## 2020-10-22 RX ADMIN — IPRATROPIUM BROMIDE 0.5 MG: 0.5 SOLUTION RESPIRATORY (INHALATION) at 07:33

## 2020-10-22 RX ADMIN — ONDANSETRON 4 MG: 2 INJECTION INTRAMUSCULAR; INTRAVENOUS at 15:54

## 2020-10-22 RX ADMIN — SODIUM CHLORIDE, PRESERVATIVE FREE 10 ML: 5 INJECTION INTRAVENOUS at 06:03

## 2020-10-22 RX ADMIN — KETOROLAC TROMETHAMINE 15 MG: 30 INJECTION, SOLUTION INTRAMUSCULAR; INTRAVENOUS at 00:10

## 2020-10-22 RX ADMIN — IPRATROPIUM BROMIDE 0.5 MG: 0.5 SOLUTION RESPIRATORY (INHALATION) at 15:42

## 2020-10-22 RX ADMIN — KETOROLAC TROMETHAMINE 15 MG: 30 INJECTION, SOLUTION INTRAMUSCULAR; INTRAVENOUS at 11:39

## 2020-10-22 RX ADMIN — KETOROLAC TROMETHAMINE 15 MG: 30 INJECTION, SOLUTION INTRAMUSCULAR; INTRAVENOUS at 06:02

## 2020-10-22 RX ADMIN — IPRATROPIUM BROMIDE 0.5 MG: 0.5 SOLUTION RESPIRATORY (INHALATION) at 19:51

## 2020-10-22 RX ADMIN — SODIUM CHLORIDE, PRESERVATIVE FREE 10 ML: 5 INJECTION INTRAVENOUS at 00:10

## 2020-10-22 RX ADMIN — SODIUM CHLORIDE, PRESERVATIVE FREE 10 ML: 5 INJECTION INTRAVENOUS at 11:40

## 2020-10-22 RX ADMIN — KETOROLAC TROMETHAMINE 15 MG: 30 INJECTION, SOLUTION INTRAMUSCULAR; INTRAVENOUS at 21:14

## 2020-10-22 RX ADMIN — IPRATROPIUM BROMIDE 0.5 MG: 0.5 SOLUTION RESPIRATORY (INHALATION) at 11:43

## 2020-10-22 ASSESSMENT — PAIN SCALES - GENERAL
PAINLEVEL_OUTOF10: 5
PAINLEVEL_OUTOF10: 2
PAINLEVEL_OUTOF10: 0
PAINLEVEL_OUTOF10: 0
PAINLEVEL_OUTOF10: 2
PAINLEVEL_OUTOF10: 2
PAINLEVEL_OUTOF10: 4
PAINLEVEL_OUTOF10: 4
PAINLEVEL_OUTOF10: 5
PAINLEVEL_OUTOF10: 2

## 2020-10-22 ASSESSMENT — PAIN DESCRIPTION - PROGRESSION
CLINICAL_PROGRESSION: GRADUALLY WORSENING

## 2020-10-22 ASSESSMENT — PAIN DESCRIPTION - PAIN TYPE
TYPE: SURGICAL PAIN
TYPE: SURGICAL PAIN
TYPE: ACUTE PAIN;SURGICAL PAIN

## 2020-10-22 ASSESSMENT — PAIN DESCRIPTION - DESCRIPTORS
DESCRIPTORS: ACHING;DULL
DESCRIPTORS: ACHING;SHARP
DESCRIPTORS: ACHING

## 2020-10-22 ASSESSMENT — PAIN DESCRIPTION - ONSET
ONSET: ON-GOING
ONSET: PROGRESSIVE
ONSET: GRADUAL

## 2020-10-22 ASSESSMENT — PAIN DESCRIPTION - LOCATION
LOCATION: CHEST
LOCATION: CHEST;BACK
LOCATION: CHEST

## 2020-10-22 ASSESSMENT — PAIN DESCRIPTION - ORIENTATION
ORIENTATION: RIGHT
ORIENTATION: RIGHT;OUTER
ORIENTATION: RIGHT

## 2020-10-22 ASSESSMENT — PAIN - FUNCTIONAL ASSESSMENT
PAIN_FUNCTIONAL_ASSESSMENT: PREVENTS OR INTERFERES SOME ACTIVE ACTIVITIES AND ADLS
PAIN_FUNCTIONAL_ASSESSMENT: PREVENTS OR INTERFERES SOME ACTIVE ACTIVITIES AND ADLS
PAIN_FUNCTIONAL_ASSESSMENT: ACTIVITIES ARE NOT PREVENTED

## 2020-10-22 ASSESSMENT — PAIN DESCRIPTION - FREQUENCY
FREQUENCY: CONTINUOUS
FREQUENCY: CONTINUOUS
FREQUENCY: INTERMITTENT

## 2020-10-22 NOTE — FLOWSHEET NOTE
Radiology here for pcxr. Pt. Tolerated well. CT put to 20 cm suction. Air bubbled thru pleuravac and then slowed to stop. Pt. More comfortable now.

## 2020-10-22 NOTE — PROGRESS NOTES
PATIENT NAME: Therman Counter    TODAY'S DATE: 10/22/20    Reason for today's visit: post procedural PTX    SUBJECTIVE:    Pt with minimal complaints. CT was placed to UWS this am but she did not tolerate it. She became SOB. CXR was done prior to returning CT to [facility] and showed enlarging PTX. OBJECTIVE:   VITALS:    Vitals:    10/22/20 1303   BP: 111/80   Pulse: 93   Resp: 17   Temp:    SpO2: 99%     INTAKE/OUTPUT:    Date 10/22/20 0000 - 10/22/20 2359   Shift 2810-9892 9683-8198 6805-9388 24 Hour Total   INTAKE   P.O. 240   240   I. V.(mL/kg/hr)  10  10   Shift Total(mL/kg) 240(5.7) 10(0.2)  250(5.9)   OUTPUT   Chest Tube 0   0   Shift Total(mL/kg) 0(0)   0(0)   Weight (kg) 42.2 42.2 42.2 42.2      Patient Vitals for the past 96 hrs (Last 3 readings):   Weight   10/20/20 1044 93 lb (42.2 kg)       EXAM:  Constitutional: Blood pressure 111/80, pulse 93, temperature 98.1 °F (36.7 °C), temperature source Oral, resp. rate 17, height 5' (1.524 m), weight 93 lb (42.2 kg), last menstrual period 01/12/2010, SpO2 99 %, not currently breastfeeding. No apparent distress, appears stated age and cooperative. Neurologic: follows commands, no focal weakness noted   Lungs: Good respiratory effort. Clear to auscultation, CT with small intermittent air leak  CV: Regular rate/ rhythm , no peripheral edema, feet warm and well perfused  GI: Soft, non-tender in all four quadrants, non-distended, + bowel sounds, spleen and liver no palpable masses   : bladder nondistended   MSK: no obvious deformity   Skin: warm, pink and dry       Data:  CBC:   Recent Labs     10/21/20  0730   WBC 10.1   HGB 13.7   HCT 41.3        BMP:    Recent Labs     10/21/20  0730      K 4.1      CO2 24   BUN 16   CREATININE 0.7   GLUCOSE 93     Hepatic: No results for input(s): AST, ALT, ALB, BILITOT, ALKPHOS in the last 72 hours.   Mag:      Recent Labs     10/21/20  0730   MG 2.1      Phos:   No results for input(s): PHOS in the last 72 hours.   INR: No results for input(s): INR in the last 72 hours. Radiology Review:  CXR independently reviewed - enlarging R PTX on UWS      ASSESSMENT:  Patient Active Problem List   Diagnosis    Syncope    Tobacco use disorder    Hyperlipidemia    [facility]ip[facility] ([facility] [address]    Malignant neoplasm of upper lobe bronchus, left (HCC)    Pulmonary nodule    Pneumothorax after biopsy       R post procedural PTX    PLAN:     Continue CT to suction x 24 hrs. Will retry UWS tomorrow.      Laurence Persaud PA-C

## 2020-10-22 NOTE — PLAN OF CARE
Problem: Airway Clearance - Ineffective:  Goal: Ability to maintain a clear airway will improve  Description: Ability to maintain a clear airway will improve  Outcome: Ongoing     Problem: Infection:  Goal: Will remain free from infection  Description: Will remain free from infection  Outcome: Ongoing     Problem: Safety:  Goal: Free from accidental physical injury  Description: Free from accidental physical injury  Outcome: Ongoing  Goal: Free from intentional harm  Description: Free from intentional harm  Outcome: Ongoing     Problem: Daily Care:  Goal: Daily care needs are met  Description: Daily care needs are met  Outcome: Ongoing     Problem: Pain:  Description: Pain management should include both nonpharmacologic and pharmacologic interventions.   Goal: Patient's pain/discomfort is manageable  Description: Patient's pain/discomfort is manageable  Outcome: Ongoing  Goal: Pain level will decrease  Description: Pain level will decrease  Outcome: Ongoing  Goal: Control of acute pain  Description: Control of acute pain  Outcome: Ongoing  Goal: Control of chronic pain  Description: Control of chronic pain  Outcome: Ongoing     Problem: Skin Integrity:  Goal: Skin integrity will stabilize  Description: Skin integrity will stabilize  Outcome: Ongoing     Problem: Discharge Planning:  Goal: Patients continuum of care needs are met  Description: Patients continuum of care needs are met  Outcome: Ongoing

## 2020-10-22 NOTE — FLOWSHEET NOTE
Pt. Becoming more uncomfortable, SOB. Will get pcxr now and put back to suction. OK'd with Dr. Agata Schroeder.

## 2020-10-22 NOTE — FLOWSHEET NOTE
Dr. Agata Schroeder updated via phone patient with continued bubbling from chest tube noted with coughing and ocassional when talking. Patient becoming fixated on when chest tube is bubbling and she is emotional. Support and encouragement. Dr. Agata Schroeder gives telephone order to maintain chest tube to suction and he will place CT to [facility] when he rounds tomorrow. Proceed with AM CXR as ordered.      Chiki Vila RN 6:44 PM

## 2020-10-23 ENCOUNTER — APPOINTMENT (OUTPATIENT)
Dept: GENERAL RADIOLOGY | Age: 55
DRG: 181 | End: 2020-10-23
Attending: THORACIC SURGERY (CARDIOTHORACIC VASCULAR SURGERY)
Payer: MEDICARE

## 2020-10-23 PROCEDURE — 6360000002 HC RX W HCPCS: Performed by: PHYSICIAN ASSISTANT

## 2020-10-23 PROCEDURE — 2000000000 HC ICU R&B

## 2020-10-23 PROCEDURE — 6360000002 HC RX W HCPCS: Performed by: INTERNAL MEDICINE

## 2020-10-23 PROCEDURE — 2700000000 HC OXYGEN THERAPY PER DAY

## 2020-10-23 PROCEDURE — 99223 1ST HOSP IP/OBS HIGH 75: CPT | Performed by: INTERNAL MEDICINE

## 2020-10-23 PROCEDURE — 2580000003 HC RX 258: Performed by: PHYSICIAN ASSISTANT

## 2020-10-23 PROCEDURE — 94150 VITAL CAPACITY TEST: CPT

## 2020-10-23 PROCEDURE — 94640 AIRWAY INHALATION TREATMENT: CPT

## 2020-10-23 PROCEDURE — 94761 N-INVAS EAR/PLS OXIMETRY MLT: CPT

## 2020-10-23 PROCEDURE — 71045 X-RAY EXAM CHEST 1 VIEW: CPT

## 2020-10-23 RX ORDER — LORAZEPAM 2 MG/ML
0.25 INJECTION INTRAMUSCULAR EVERY 8 HOURS PRN
Status: DISCONTINUED | OUTPATIENT
Start: 2020-10-23 | End: 2020-10-24 | Stop reason: HOSPADM

## 2020-10-23 RX ORDER — LANOLIN ALCOHOL/MO/W.PET/CERES
3 CREAM (GRAM) TOPICAL NIGHTLY PRN
Status: DISCONTINUED | OUTPATIENT
Start: 2020-10-23 | End: 2020-10-24 | Stop reason: HOSPADM

## 2020-10-23 RX ADMIN — IPRATROPIUM BROMIDE 0.5 MG: 0.5 SOLUTION RESPIRATORY (INHALATION) at 07:17

## 2020-10-23 RX ADMIN — ONDANSETRON 4 MG: 2 INJECTION INTRAMUSCULAR; INTRAVENOUS at 04:21

## 2020-10-23 RX ADMIN — KETOROLAC TROMETHAMINE 15 MG: 30 INJECTION, SOLUTION INTRAMUSCULAR; INTRAVENOUS at 19:07

## 2020-10-23 RX ADMIN — KETOROLAC TROMETHAMINE 15 MG: 30 INJECTION, SOLUTION INTRAMUSCULAR; INTRAVENOUS at 12:15

## 2020-10-23 RX ADMIN — IPRATROPIUM BROMIDE 0.5 MG: 0.5 SOLUTION RESPIRATORY (INHALATION) at 21:27

## 2020-10-23 RX ADMIN — SODIUM CHLORIDE, PRESERVATIVE FREE 10 ML: 5 INJECTION INTRAVENOUS at 20:46

## 2020-10-23 RX ADMIN — SODIUM CHLORIDE, PRESERVATIVE FREE 10 ML: 5 INJECTION INTRAVENOUS at 08:33

## 2020-10-23 RX ADMIN — LORAZEPAM 0.25 MG: 2 INJECTION INTRAMUSCULAR; INTRAVENOUS at 19:07

## 2020-10-23 RX ADMIN — KETOROLAC TROMETHAMINE 15 MG: 30 INJECTION, SOLUTION INTRAMUSCULAR; INTRAVENOUS at 04:20

## 2020-10-23 RX ADMIN — ONDANSETRON 4 MG: 2 INJECTION INTRAMUSCULAR; INTRAVENOUS at 16:27

## 2020-10-23 RX ADMIN — IPRATROPIUM BROMIDE 0.5 MG: 0.5 SOLUTION RESPIRATORY (INHALATION) at 11:04

## 2020-10-23 RX ADMIN — LORAZEPAM 0.25 MG: 2 INJECTION INTRAMUSCULAR; INTRAVENOUS at 08:38

## 2020-10-23 RX ADMIN — IPRATROPIUM BROMIDE 0.5 MG: 0.5 SOLUTION RESPIRATORY (INHALATION) at 15:10

## 2020-10-23 ASSESSMENT — PAIN DESCRIPTION - PROGRESSION
CLINICAL_PROGRESSION: GRADUALLY WORSENING

## 2020-10-23 ASSESSMENT — PAIN DESCRIPTION - ORIENTATION
ORIENTATION: RIGHT
ORIENTATION: RIGHT;LEFT
ORIENTATION: RIGHT

## 2020-10-23 ASSESSMENT — PAIN - FUNCTIONAL ASSESSMENT
PAIN_FUNCTIONAL_ASSESSMENT: PREVENTS OR INTERFERES SOME ACTIVE ACTIVITIES AND ADLS
PAIN_FUNCTIONAL_ASSESSMENT: ACTIVITIES ARE NOT PREVENTED
PAIN_FUNCTIONAL_ASSESSMENT: PREVENTS OR INTERFERES SOME ACTIVE ACTIVITIES AND ADLS

## 2020-10-23 ASSESSMENT — PAIN DESCRIPTION - LOCATION
LOCATION: BACK;SHOULDER
LOCATION: CHEST
LOCATION: CHEST

## 2020-10-23 ASSESSMENT — PAIN DESCRIPTION - FREQUENCY
FREQUENCY: CONTINUOUS
FREQUENCY: INTERMITTENT
FREQUENCY: CONTINUOUS

## 2020-10-23 ASSESSMENT — PAIN SCALES - GENERAL
PAINLEVEL_OUTOF10: 2
PAINLEVEL_OUTOF10: 2
PAINLEVEL_OUTOF10: 5
PAINLEVEL_OUTOF10: 7
PAINLEVEL_OUTOF10: 0
PAINLEVEL_OUTOF10: 0
PAINLEVEL_OUTOF10: 5
PAINLEVEL_OUTOF10: 0

## 2020-10-23 ASSESSMENT — PAIN DESCRIPTION - ONSET
ONSET: ON-GOING
ONSET: AWAKENED FROM SLEEP
ONSET: PROGRESSIVE

## 2020-10-23 ASSESSMENT — PAIN DESCRIPTION - DESCRIPTORS
DESCRIPTORS: ACHING;DULL
DESCRIPTORS: ACHING
DESCRIPTORS: CRAMPING

## 2020-10-23 ASSESSMENT — PAIN DESCRIPTION - PAIN TYPE
TYPE: SURGICAL PAIN
TYPE: ACUTE PAIN
TYPE: SURGICAL PAIN

## 2020-10-23 NOTE — PROGRESS NOTES
Dr. Ricky Black here, patient tearful, explained she has medical Rickie Skeens for use at home, Ricky Sanchez request that Clint Sanchez see patient today, emotional support, offered, RSR/VVS

## 2020-10-23 NOTE — PROGRESS NOTES
PATIENT NAME: Manisha Mijares    TODAY'S DATE: 10/23/20    Reason for today's visit: post procedural PTX    SUBJECTIVE:    Pt had some anxiety last night. Path report was discussed with her this am by Dr. Mya Coleman. She has minimal SOB today. OBJECTIVE:   VITALS:    Vitals:    10/23/20 1000   BP: 91/74   Pulse: 110   Resp: 17   Temp:    SpO2:      INTAKE/OUTPUT:    Date 10/23/20 0000 - 10/23/20 2359   Shift 4917-8971 [id number] 5549-4545 24 Hour Total   INTAKE   Shift Total(mL/kg)       OUTPUT   Urine(mL/kg/hr) 300(0.9)   300   Chest Tube 10   10   Shift Total(mL/kg) 310(7.4)   310(7.4)   Weight (kg) 41.8 41.8 41.8 41.8      Patient Vitals for the past 96 hrs (Last 3 readings):   Weight   10/23/20 0400 92 lb 1.6 oz (41.8 kg)   10/20/20 1044 93 lb (42.2 kg)       EXAM:  Constitutional: Blood pressure 91/74, pulse 110, temperature 98.1 °F (36.7 °C), temperature source Oral, resp. rate 17, height 5' (1.524 m), weight 92 lb 1.6 oz (41.8 kg), last menstrual period 01/12/2010, SpO2 93 %, not currently breastfeeding. No apparent distress, appears stated age and cooperative. Neurologic: follows commands, no focal weakness noted   Lungs: Good respiratory effort. Clear to auscultation, CT with small intermittent air leak  CV: Regular rate/ rhythm , no peripheral edema, feet warm and well perfused  GI: Soft, non-tender in all four quadrants, non-distended, + bowel sounds, spleen and liver no palpable masses   : bladder nondistended   MSK: no obvious deformity   Skin: warm, pink and dry       Data:  CBC:   Recent Labs     10/21/20  0730   WBC 10.1   HGB 13.7   HCT 41.3        BMP:    Recent Labs     10/21/20  0730      K 4.1      CO2 24   BUN 16   CREATININE 0.7   GLUCOSE 93     Hepatic: No results for input(s): AST, ALT, ALB, BILITOT, ALKPHOS in the last 72 hours. Mag:      Recent Labs     10/21/20  0730   MG 2.1      Phos:   No results for input(s): PHOS in the last 72 hours.    INR: No results for input(s): INR in the last 72 hours. Radiology Review:  CXR independently reviewed - no PTX with CT to suction       ASSESSMENT:  Patient Active Problem List   Diagnosis    Syncope    Tobacco use disorder    Hyperlipidemia    [facility]ares[facility] [address]    Malignant neoplasm of upper lobe bronchus, left (HCC)    Pulmonary nodule    Pneumothorax after biopsy       R post procedural PTX    PLAN:     CT with persistent air leak. Continue to suction. Ativan ordered by Dr. Mya Coleman. Pt is aware of path report.      Rolo Castillo PA-C

## 2020-10-23 NOTE — PLAN OF CARE
Skin Integrity:  Goal: Skin integrity will stabilize  Description: Skin integrity will stabilize  10/23/2020 0841 by Torrey Vuong RN  Outcome: Ongoing  10/22/2020 2026 by Liz Davalos RN  Outcome: Ongoing     Problem: Discharge Planning:  Goal: Patients continuum of care needs are met  Description: Patients continuum of care needs are met  10/23/2020 0841 by Torrey Vuong RN  Outcome: Ongoing  10/22/2020 2026 by Liz Davalos RN  Outcome: Ongoing

## 2020-10-23 NOTE — FLOWSHEET NOTE
Pt really struggling with being in the hospital bed and restricted to watching TV. Spoke to nurse about pt request to move about to room.  offered prayer and emotional support.

## 2020-10-23 NOTE — CONSULTS
Hematology/Oncology  Consult Note      Reason for Consult:  H/o NSCLC  Requesting Physician:  Dr Wilma Rapp:  pneumothorax    History Obtained From:  patient, electronic medical record    HISTORY OF PRESENT ILLNESS:      The patient is a 63 y.o. female with significant past medical history of NSCLC who presents with pneumothorax s/p navigational bronchoscopy. Path report showed NSCLC. I will review with radiation oncologist and pathologist.  She may benefit from SBRT.     Past Medical History:        Diagnosis Date    Acid reflux     Anemia     Asthma     [facility]erebral artery occlusion with [facility] infarction [facility] [address]     COPD (chronic obstructive pulmonary disease) (HCC)     \"use to see Dr Sb Lion but do not care for him\"    Full dentures     Gestational diabetes     \"had gestational diabetess 1984\"\"no trouble with sugar since then\"    History of kidney stones     \"passed a kidney stone approx 2016\"\"they said I had 3 stones and know for sure passed one\"    Hyperlipidemia     Lung cancer ([facility] [address]     \"had left lung cancer - removed the lung- 2013= tx with chemo treatments- follow with Dr Costa Common"    Lung nodule     \"one spot on right lung- having bronchoscopy\"( 10/16/2020)    Pneumonia 2018    emphysema, COPD    Pneumothorax     TIA (transient ischemic attack)     \"had TIA in 2012- passed out had weakness right side,affected my memory- took approx 6 months for everything to come back\"     Past Surgical History:        Procedure Laterality Date    BREAST SURGERY  2012    double mastectomy (fibroid) after several lumpectomies    BRONCHOSCOPY N/A 10/20/2020    BRONCHOSCOPY ISAAC performed by Uriah Schmitz MD at [address]  07/2014    CT GUIDED CHEST TUBE  10/20/2020    CT GUIDED CHEST TUBE 10/20/2020 [facility] SC[facility]    HERNIA REPAIR  2010    ventral hernia repair    LUNG BIOPSY Left 01/02/2014    Left upper lobe mass    LUNG SURGERY Left 02/27/2014 Left peunomectomy with lymph node sampling    LUNG SURGERY  2001    MEDIASTINOSCOPY  02/14/2014    bx lymph nodes    TONSILLECTOMY  as a kid       Current Medications:    Current Facility-Administered Medications: LORazepam (ATIVAN) injection 0.25 mg, 0.25 mg, Intravenous, Q8H PRN  ketorolac (TORADOL) injection 15 mg, 15 mg, Intravenous, Q6H PRN  ipratropium (ATROVENT) 0.02 % nebulizer solution 0.5 mg, 0.5 mg, Nebulization, 4x Daily  sodium chloride flush 0.9 % injection 10 mL, 10 mL, Intravenous, 2 times per day  sodium chloride flush 0.9 % injection 10 mL, 10 mL, Intravenous, PRN  acetaminophen (TYLENOL) tablet 650 mg, 650 mg, Oral, Q4H PRN  ondansetron (ZOFRAN) injection 4 mg, 4 mg, Intravenous, Q6H PRN    Allergies:  Erythromycin; Macrolides and ketolides; Tape [adhesive tape]; Codeine; and Vicodin [hydrocodone-acetaminophen]    Social History:    TOBACCO:   reports that she has been smoking cigarettes. She started smoking about 43 years ago. She has a 10.75 pack-year smoking history. She has never used smokeless tobacco.  ETOH:   reports current alcohol use. DRUGS:   reports current drug use. Drug: Marijuana. Family History:       Problem Relation Age of Onset    Cancer Father         lung cancer    Heart Disease Father     Substance Abuse Father     Heart Attack Father     Depression Paternal Aunt     Arthritis Maternal Grandfather     Diabetes Maternal Grandfather     High Blood Pressure Maternal Grandfather     Vision Loss Maternal Grandfather     Cancer Paternal Grandfather     High Cholesterol Mother     Cancer Mother         throat cancer    Heart Disease Mother     Miscarriages / Stillbirths Sister      REVIEW OF SYSTEMS:    She has anxiety and is agreeable to low dose ativan PRN. No NVD. No fever or chills. No acute pain. The remainder of ROS is unremarkable.     PHYSICAL EXAM:      Vitals:    /82   Pulse 71   Temp 98.1 °F (36.7 °C) (Oral)   Resp 19   Ht 5' (1.524 m)   [facility] 92 lb 1.6 oz (41.8 kg)   LMP 01/12/2010   SpO2 99%   BMI 17.99 kg/m²     CONSTITUTIONAL:  awake, alert, cooperative and no apparent distress  EYES:  extra-ocular muscles intact and sclera clear  NECK:  supple, symmetrical, trachea midline, no stridor and no lymphadenopathy  LUNGS:  clear to auscultation, no crackles or wheezing, chest tube noted. CARDIOVASCULAR:  normal S1 and S2 and no murmur noted  ABDOMEN:  normal bowel sounds, soft, non-distended and non-tender  MUSCULOSKELETAL:  there is no redness, warmth, or swelling of the joints  full range of motion noted  NEUROLOGIC:  Cranial Nerves:  cranial nerves II-XII are grossly intact  SKIN:  no rashes and no jaundice  DATA:    Labs:  General Labs:    CBC with Differential:    Lab Results   Component Value Date    WBC 10.1 10/21/2020    RBC 4.35 10/21/2020    HGB 13.7 10/21/2020    HCT 41.3 10/21/2020     10/21/2020    MCV 94.9 10/21/2020    MCH 31.5 10/21/2020    MCHC 33.2 10/21/2020    RDW 13.9 10/21/2020    SEGSPCT 59.6 10/12/2020    LYMPHOPCT 33.5 10/12/2020    MONOPCT 5.2 10/12/2020    BASOPCT 0.4 10/12/2020    MONOSABS 0.2 10/12/2020    LYMPHSABS 1.6 10/12/2020    EOSABS 0.1 10/12/2020    BASOSABS 0.0 10/12/2020    DIFFTYPE AUTOMATED DIFFERENTIAL 10/12/2020     CMP:    Lab Results   Component Value Date     10/21/2020    K 4.1 10/21/2020     10/21/2020    CO2 24 10/21/2020    BUN 16 10/21/2020    CREATININE 0.7 10/21/2020    GFRAA >60 10/21/2020    LABGLOM >60 10/21/2020    GLUCOSE 93 10/21/2020    PROT 7.1 05/26/2020    LABALBU 4.6 05/26/2020    CALCIUM 9.0 10/21/2020    BILITOT 0.5 05/26/2020    ALKPHOS 148 05/26/2020    AST 17 05/26/2020    ALT 18 05/26/2020     IMPRESSION/RECOMMENDATIONS:      1. She has ?recurrent lung cancer vs second primary. Will review pathologist and discuss with Dr Luis Gomez. 2. Will give low dose ativan PRN. Hopefully home soon. Thanks.

## 2020-10-23 NOTE — PROGRESS NOTES
DR. Kayla Vang called RN for update.  Orders received:    Portable chest xray in am  Heimlich valve to bedside

## 2020-10-24 ENCOUNTER — APPOINTMENT (OUTPATIENT)
Dept: GENERAL RADIOLOGY | Age: 55
DRG: 181 | End: 2020-10-24
Attending: THORACIC SURGERY (CARDIOTHORACIC VASCULAR SURGERY)
Payer: MEDICARE

## 2020-10-24 VITALS
WEIGHT: 92.1 LBS | TEMPERATURE: 98 F | HEIGHT: 60 IN | OXYGEN SATURATION: 95 % | DIASTOLIC BLOOD PRESSURE: 85 MMHG | BODY MASS INDEX: 18.08 KG/M2 | HEART RATE: 91 BPM | RESPIRATION RATE: 19 BRPM | SYSTOLIC BLOOD PRESSURE: 105 MMHG

## 2020-10-24 PROCEDURE — 6360000002 HC RX W HCPCS: Performed by: INTERNAL MEDICINE

## 2020-10-24 PROCEDURE — 71045 X-RAY EXAM CHEST 1 VIEW: CPT

## 2020-10-24 PROCEDURE — 94640 AIRWAY INHALATION TREATMENT: CPT

## 2020-10-24 PROCEDURE — 2580000003 HC RX 258: Performed by: PHYSICIAN ASSISTANT

## 2020-10-24 PROCEDURE — 6370000000 HC RX 637 (ALT 250 FOR IP): Performed by: THORACIC SURGERY (CARDIOTHORACIC VASCULAR SURGERY)

## 2020-10-24 PROCEDURE — 6370000000 HC RX 637 (ALT 250 FOR IP): Performed by: SURGERY

## 2020-10-24 PROCEDURE — 6360000002 HC RX W HCPCS: Performed by: PHYSICIAN ASSISTANT

## 2020-10-24 PROCEDURE — 94150 VITAL CAPACITY TEST: CPT

## 2020-10-24 PROCEDURE — 94761 N-INVAS EAR/PLS OXIMETRY MLT: CPT

## 2020-10-24 PROCEDURE — 6360000002 HC RX W HCPCS: Performed by: THORACIC SURGERY (CARDIOTHORACIC VASCULAR SURGERY)

## 2020-10-24 RX ORDER — CARVEDILOL 3.12 MG/1
3.12 TABLET ORAL 2 TIMES DAILY WITH MEALS
Status: DISCONTINUED | OUTPATIENT
Start: 2020-10-24 | End: 2020-10-24 | Stop reason: HOSPADM

## 2020-10-24 RX ORDER — CARVEDILOL 3.12 MG/1
3.12 TABLET ORAL 2 TIMES DAILY WITH MEALS
Qty: 60 TABLET | Refills: 3 | Status: SHIPPED | OUTPATIENT
Start: 2020-10-24 | End: 2021-12-29

## 2020-10-24 RX ADMIN — KETOROLAC TROMETHAMINE 15 MG: 30 INJECTION, SOLUTION INTRAMUSCULAR; INTRAVENOUS at 16:14

## 2020-10-24 RX ADMIN — IPRATROPIUM BROMIDE 0.5 MG: 0.5 SOLUTION RESPIRATORY (INHALATION) at 11:02

## 2020-10-24 RX ADMIN — SODIUM CHLORIDE, PRESERVATIVE FREE 10 ML: 5 INJECTION INTRAVENOUS at 11:19

## 2020-10-24 RX ADMIN — LORAZEPAM 0.25 MG: 2 INJECTION INTRAMUSCULAR; INTRAVENOUS at 16:14

## 2020-10-24 RX ADMIN — KETOROLAC TROMETHAMINE 15 MG: 30 INJECTION, SOLUTION INTRAMUSCULAR; INTRAVENOUS at 01:36

## 2020-10-24 RX ADMIN — CARVEDILOL 3.12 MG: 3.12 TABLET, FILM COATED ORAL at 11:21

## 2020-10-24 ASSESSMENT — PAIN DESCRIPTION - PAIN TYPE
TYPE: ACUTE PAIN
TYPE: ACUTE PAIN;SURGICAL PAIN
TYPE: ACUTE PAIN

## 2020-10-24 ASSESSMENT — PAIN DESCRIPTION - ONSET
ONSET: ON-GOING

## 2020-10-24 ASSESSMENT — PAIN DESCRIPTION - ORIENTATION
ORIENTATION: RIGHT;MID
ORIENTATION: RIGHT
ORIENTATION: RIGHT

## 2020-10-24 ASSESSMENT — PAIN DESCRIPTION - FREQUENCY
FREQUENCY: CONTINUOUS

## 2020-10-24 ASSESSMENT — PAIN DESCRIPTION - PROGRESSION
CLINICAL_PROGRESSION: NOT CHANGED
CLINICAL_PROGRESSION: GRADUALLY IMPROVING
CLINICAL_PROGRESSION: GRADUALLY WORSENING

## 2020-10-24 ASSESSMENT — PAIN DESCRIPTION - DESCRIPTORS
DESCRIPTORS: ACHING
DESCRIPTORS: ACHING
DESCRIPTORS: ACHING;DULL

## 2020-10-24 ASSESSMENT — PAIN DESCRIPTION - LOCATION
LOCATION: CHEST
LOCATION: CHEST;OTHER (COMMENT)
LOCATION: BACK;RIB CAGE

## 2020-10-24 ASSESSMENT — PAIN - FUNCTIONAL ASSESSMENT
PAIN_FUNCTIONAL_ASSESSMENT: ACTIVITIES ARE NOT PREVENTED
PAIN_FUNCTIONAL_ASSESSMENT: ACTIVITIES ARE NOT PREVENTED

## 2020-10-24 ASSESSMENT — PAIN SCALES - GENERAL
PAINLEVEL_OUTOF10: 4
PAINLEVEL_OUTOF10: 4
PAINLEVEL_OUTOF10: 2

## 2020-10-24 NOTE — FLOWSHEET NOTE
Dr Ad Barney called and notified of portable CXR results with developing small right pneumothorax. Telephone orders received to postpone discharge at this time and order another portable CXR at 1800 today. Pneumostat valve to remain on. RN educated patient on plan of care, she is very tearful but understanding. Emotional support given.      Pepe Eller RN 1:38 PM

## 2020-10-24 NOTE — PROGRESS NOTES
Shes very anxious to go home     The right chest tube is still in place with occasional air leak.    per previous discussions, Will place a Heimlich valve and  recheck chest x-ray

## 2020-10-24 NOTE — PLAN OF CARE
cope will improve  Description: Ability to cope will improve  Outcome: Ongoing     Problem: Health Behavior:  Goal: Adoption of positive health habits will improve  Description: Adoption of positive health habits will improve  Outcome: Ongoing  Goal: Identification of resources available to assist in meeting health care needs will improve  Description: Identification of resources available to assist in meeting health care needs will improve  Outcome: Ongoing  Goal: Ability to verbalize follow-up procedures will improve  Description: Ability to verbalize follow-up procedures will improve  Outcome: Ongoing     Problem: Nutritional:  Goal: Nutritional status will improve  Description: Nutritional status will improve  Outcome: Ongoing     Problem: Physical Regulation:  Goal: Complications related to the disease process, condition or treatment will be avoided or minimized  Description: Complications related to the disease process, condition or treatment will be avoided or minimized  Outcome: Ongoing     Problem: Sensory:  Goal: Ability to develop a pain control plan will improve  Description: Ability to develop a pain control plan will improve  Outcome: Ongoing

## 2020-10-24 NOTE — FLOWSHEET NOTE
Dr. Sainz Parent notified of HR increase to 135-140 when walking large lap around ICU. Once back to bed, back to her baseline HR low 100's. Telephone orders received to start low dose Coreg 3.125 mg PO BID.      Alfredo Garsia RN 10:46 AM

## 2020-10-24 NOTE — FLOWSHEET NOTE
Dr. Ad Fraga at bedside, switched chest tube [facility] to Pneumostat valve.  Verbal orders received to obtain portable CXR at noon today and RN to call him with results     Talya Espinoza RN 10:04 AM

## 2020-10-24 NOTE — PLAN OF CARE
Problem: Airway Clearance - Ineffective:  Goal: Ability to maintain a clear airway will improve  Description: Ability to maintain a clear airway will improve  10/24/2020 0919 by Bernard Dunham RN  Outcome: Ongoing  10/24/2020 0243 by Crystal Platt RN  Outcome: Ongoing     Problem: Infection:  Goal: Will remain free from infection  Description: Will remain free from infection  10/24/2020 0919 by Bernard Dunham RN  Outcome: Ongoing  10/24/2020 0243 by Crystal Platt RN  Outcome: Ongoing     Problem: Safety:  Goal: Free from accidental physical injury  Description: Free from accidental physical injury  10/24/2020 0919 by Bernard Dunham RN  Outcome: Ongoing  10/24/2020 0243 by Crystal Platt RN  Outcome: Ongoing  Goal: Free from intentional harm  Description: Free from intentional harm  10/24/2020 0919 by Bernard Dunham RN  Outcome: Ongoing  10/24/2020 0243 by Crystal Platt RN  Outcome: Ongoing  Goal: Ability to remain free from injury will improve  Description: Ability to remain free from injury will improve  10/24/2020 0919 by Bernard Dunham RN  Outcome: Ongoing  10/24/2020 0243 by Crystal Platt RN  Outcome: Ongoing     Problem: Daily Care:  Goal: Daily care needs are met  Description: Daily care needs are met  10/24/2020 0919 by Bernard Dunham RN  Outcome: Ongoing  10/24/2020 0243 by Crystal Platt RN  Outcome: Ongoing     Problem: Pain:  Description: Pain management should include both nonpharmacologic and pharmacologic interventions.   Goal: Patient's pain/discomfort is manageable  Description: Patient's pain/discomfort is manageable  10/24/2020 0919 by Bernard Dunham RN  Outcome: Ongoing  10/24/2020 0243 by Crystal Platt RN  Outcome: Ongoing  Goal: Pain level will decrease  Description: Pain level will decrease  10/24/2020 0919 by Bernard Dunham RN  Outcome: Ongoing  10/24/2020 0243 by Crystal Platt RN  Outcome: Ongoing  Goal: Control of acute pain  Description: Control of acute pain  10/24/2020 0919 by Bernard Dunham RN  Outcome: Ongoing  10/24/2020 0243 by Danni Schmitt RN  Outcome: Ongoing  Goal: Control of chronic pain  Description: Control of chronic pain  10/24/2020 0919 by Selene Chua RN  Outcome: Ongoing  10/24/2020 0243 by Danni Schmitt RN  Outcome: Ongoing     Problem: Skin Integrity:  Goal: Skin integrity will stabilize  Description: Skin integrity will stabilize  10/24/2020 0919 by Selene Chua RN  Outcome: Ongoing  10/24/2020 0243 by Danni Schmitt RN  Outcome: Ongoing     Problem: Discharge Planning:  Goal: Patients continuum of care needs are met  Description: Patients continuum of care needs are met  10/24/2020 0919 by Selene Chua RN  Outcome: Ongoing  10/24/2020 0243 by Danni Schmitt RN  Outcome: Ongoing     Problem:  Activity:  Goal: Ability to tolerate increased activity will improve  Description: Ability to tolerate increased activity will improve  10/24/2020 0919 by Selene Chua RN  Outcome: Ongoing  10/24/2020 0243 by Danni Schmitt RN  Outcome: Ongoing  Goal: Ability to implement measures to reduce episodes of fatigue will improve  Description: Ability to implement measures to reduce episodes of fatigue will improve  10/24/2020 0919 by Selene Chua RN  Outcome: Ongoing  10/24/2020 0243 by Danni Schmitt RN  Outcome: Ongoing     Problem: Coping:  Goal: Ability to cope will improve  Description: Ability to cope will improve  10/24/2020 0919 by Selene Chua RN  Outcome: Ongoing  10/24/2020 0243 by Danni Schmitt RN  Outcome: Ongoing     Problem: Health Behavior:  Goal: Adoption of positive health habits will improve  Description: Adoption of positive health habits will improve  10/24/2020 0919 by Selene Chua RN  Outcome: Ongoing  10/24/2020 0243 by Danni Schmitt RN  Outcome: Ongoing  Goal: Identification of resources available to assist in meeting health care needs will improve  Description: Identification of resources available to assist in meeting health care needs will improve  10/24/2020 0919 by Selene Chua RN  Outcome: Ongoing  10/24/2020 0243 by Alistair Richards RN  Outcome: Ongoing  Goal: Ability to verbalize follow-up procedures will improve  Description: Ability to verbalize follow-up procedures will improve  10/24/2020 0919 by Woody Smiley RN  Outcome: Ongoing  10/24/2020 0243 by Alistair Richards RN  Outcome: Ongoing     Problem: Nutritional:  Goal: Nutritional status will improve  Description: Nutritional status will improve  10/24/2020 0919 by Woody Smiley RN  Outcome: Ongoing  10/24/2020 0243 by Alistair Richards RN  Outcome: Ongoing     Problem: Physical Regulation:  Goal: Complications related to the disease process, condition or treatment will be avoided or minimized  Description: Complications related to the disease process, condition or treatment will be avoided or minimized  10/24/2020 0919 by Woody Smiley RN  Outcome: Ongoing  10/24/2020 0243 by Alistair Richards RN  Outcome: Ongoing     Problem: Sensory:  Goal: Ability to develop a pain control plan will improve  Description: Ability to develop a pain control plan will improve  10/24/2020 0919 by Woody Smiley RN  Outcome: Ongoing  10/24/2020 0243 by Alistair Richards RN  Outcome: Ongoing

## 2020-10-24 NOTE — FLOWSHEET NOTE
Per Dr. Moisés Thornton (on call for Dr. Demar Springer) patient not to receive flu shot at this time. To follow up with her PCP in about a week to receive flu shot. Patient does sounds nasally congested this morning.      Kamila Osborne RN 11:02 AM

## 2020-10-25 LAB
CULTURE: NORMAL
Lab: NORMAL
SPECIMEN: NORMAL

## 2020-10-25 NOTE — FLOWSHEET NOTE
Spoke with Dr. Nagi Rushing via phone, he is placing order for discharge now. Patient is adamant that if she is not discharged she will leave AMA \"no matter what my CXR says. \" Patient also states she is \"not going to take any pills I am prescribed. \" Dr. Nagi Rushing aware. Robert EISENBERG aware. Patient to go home with Pneumostat valve. Patient educated on possibility that pneumothorax might worsen and she is aware and still adamant to go home.      Andressa Villagomez RN 8:08 PM

## 2020-10-26 ENCOUNTER — HOSPITAL ENCOUNTER (OUTPATIENT)
Age: 55
Discharge: HOME | End: 2020-10-26
Payer: MEDICARE

## 2020-10-26 ENCOUNTER — HOSPITAL ENCOUNTER (OUTPATIENT)
Dept: GENERAL RADIOLOGY | Age: 55
Discharge: HOME | End: 2020-10-26
Payer: MEDICARE

## 2020-10-26 PROCEDURE — 71046 X-RAY EXAM CHEST 2 VIEWS: CPT

## 2020-10-26 NOTE — PROGRESS NOTES
Patient Name: Kyrie Host  Patient : 1965  Patient MRN: [id number]     Primary Oncologist: Farhat Jacques MD  Referring Provider: Thierry German MD     Date of Service: 10/30/2020      Chief Complaint:   Chief Complaint   Patient presents with    Follow-up     She came in for follow-up visit. Patient Active Problem List:     Syncope     Tobacco use disorder     Hyperlipidemia     Hemiparesis (HCC)     Malignant neoplasm of upper lobe bronchus, left (HCC)     Pulmonary nodule     HPI:   Mrs. Arnold Miller is a pleasant 65-year-old female patient with history of lung surgery due to the bullae in , performed by Dr. Shanel Chun to prevent lung collapse, and high-grade adenocarcinoma of the lung, status post left upper lobe mass biopsy on 2014. The pathology report showed high-grade carcinoma with abundant necrosis. Immunostain study showed that the tumor cells were positive for CK7 and TTF1 and negative for CK5-6, Napsin A, and B63. The tumor was consistent with high-grade adenocarcinoma of the lung origin. EGFR and ALK rearrangement studies were negative. She went to the emergency room in 2013 with complaint of pain to the ribcage. A CT of the chest without contrast on 2013 showed a 6.2 by 4.8 by 6.9 cm partially calcified large infiltrative mass involving the left upper lobe and multiple small mediastinal and left hilar nodes. Metastatic disease could not be excluded. Indeterminate hypodense lesions within the liver and involving the pancreas were described. Left adrenal gland was mildly hyperplastic. CT abdomen and pelvis on 2013 showed benign appearing left hepatic lobe mass, which was seen in 2007. This was probably an hemangioma. A smaller satellite nodule appeared benign and was probably a cyst.  No compelling evidence for solid organ metastases. There was no evidence of abnormal enhancement in the pancreatic head or neck. cell count of 3.7, hemoglobin 13.3, platelet 847, BUN 16, creatinine 0.6, calcium 9.0, albumin 4.5, total protein 7.4, total bilirubin 0.4, alk phos 143, AST 23, ALT 18, magnesium 2.0. I planned to have a CT scan every six months in the first two or three years and yearly afterwards. CT chest in June 2014 showed no evidence of metastatic disease. She passed a kidney stone. Blood tests in December 2014 showed normal CBC. CMP was stable, with alk phos 150 and calcium 9.2. A CT of the chest in December 2014 showed no evidence of metastatic disease and no significant interval change compared to previous study. She had colonoscopy in July 2014 by Dr. Suhas Barraza and reportedly there were no polyps. Blood test in June 2015 showed normal CBC. CMP was stable with alk phos 165. CT of the chest in June 2015 showed no evidence of progression of the disease. The osseous and soft tissue structures appeared otherwise normal.   CT chest in December 2015 revealed no evidence of metastatic disease or progression of the cancer. Her blood test in October 2015 revealed normal CBC and CMP. TSH was 0.88, ferritin 78, B12 281. Iron was 92. Ferritin was 78. CMP was within normal limits. Blood test in July 2016 revealed normal CBC and CMP. CT of the chest in June 2016 revealed no evidence of progression of the disease. CT chest in April 2018 revealed stable post surgical changes from the left pneumonectomy with stable loculated pleural effusion and no evidence of metastatic disease in the chest. 4 mm ground-glass nodule in the right upper lobe was noted. Follow-up study in 3 months was recommended by the radiologist.    F/U CT chest in July 2018 :  Previously visualized ground-glass nodule no longer visualized. No significant change in appearance of the chest otherwise. CXR in April 2019 showed no acute abnormality. She quit smoking in March 2019.     CT chest in October 2019 showed :  1. Stable postsurgical changes from left pneumonectomy. 2. New 5 mm subsolid nodule in the right middle lobe adjacent to the minor fissure. This may have been present on the prior exams but had a more subtle ground-glass appearance on the prior exams. Recommend a short-term follow-up in 3 months versus per clinical protocol. 3. Emphysematous changes. She is a secondhand smoker.  continues to smoke. Maternal aunt had recurrent lung cancer. CT chest in January 2020 showed stable 6 mm irregular RML nodule, more solid concerning for malignancy. Too small for PET or biopsy. She went to [address] [place] [address].  CT chest on May 4, 2020 showed slight increased size of the spiculated subpleural right middle lobe nodule, now approximately 10 mm maximal diameter previously 6 mm. Other ill-defined groundglass nodules in the right upper lobe and right lower lobe appeared similar. Suspect simple appearing left renal and left hepatic cyst, nonobstructing right nephrolithiasis. She smokes few cigarettes a day. She decided that she may not consider any chemotherapy, should the disease come back again  CBC was stable. CMP showed increased alkaline phosphatase. PET CT scan in June 2020 and CT chest in September 2020 were reviewed. Due to enlarging nodule I have referred to radiation oncologist for potential stereotactic radiotherapy. On October 30, 2020 she came in for follow-up visit. She underwent bronchoscopy with navigational bronchoscopy on October 22, 2020. She developed pneumothorax. Final Cytologic Diagnosis:   A.  Right Middle Lobe Lung Brushing cytology with cell   block:        POSITIVE FOR MALIGNANCY.       B.  Right Middle Lobe Lavage cytology with cell block:        Nondiagnostic.       C.  Right Middle Lobe Lung Fine Needle Aspirate cytology   with cell block:        POSITIVE FOR MALIGNANCY. Schedule next week for 3 days of start tactic radiotherapy.   Regimen oncologist will reschedule CT chest.  Pneumothorax is resolved. She is agreeable to come back in follow-up in 7 weeks. We discussed about surveillance after completion of radiation therapy. She is not interested in chemotherapy. Energy level is fine. Appetite is alright. She denied any night sweats, fever, or chills. No hemoptysis. No nausea, vomiting, dysuria, or hematuria. No acute pain. PAST MEDICAL HISTORY:  History of lung disease, status post lung surgery in 2004, history of bilateral mastectomy due to the frequent lumpectomies in 2005 by Dr. She Edward, history of cholecystectomy in [facility], [facility] in 2012. She had a history of dyslipidemia. FAMILY HISTORY:  Father had lung cancer and he was a smoker. Mother had throat cancer and colon cancer in her seventies. Maternal grandfather had colon cancer in his eighties. One maternal aunt had lung cancer. She stated one cousin was diagnosed with pancreatic cancer. SOCIAL HISTORY:  She smoked about one pack per day for 35 years and she is using electronic cigarettes. She drinks alcohol occasionally. She is  and has one child. She does clerical work. ALLERGIES:  Erythromycin, tape, she has intolerance to Vicodin and codeine. MEDICATIONS:  Reviewed as per chart. LABORATORY STUDIES:   January 6, 2014: White cell count 9.2, hemoglobin 13.4, platelet 652, BUN 17, creatinine 0.6, AST 20, ALT 11, calcium 9.6, alk phos 113. Review of Systems: \"Per interval history; otherwise 10 point ROS is negative. \"     Vital Signs:  BP (!) 151/79 (Site: Left Upper Arm, Position: Sitting, Cuff Size: Medium Adult)   Pulse 98   Temp 96.8 °F (36 °C) (Infrared)   Resp 16   Ht 5' (1.524 m)   Wt 90 lb (40.8 kg)   LMP 01/12/2010   SpO2 97%   BMI 17.58 kg/m²     Physical Exam:  CONSTITUTIONAL: awake, alert, cooperative, no apparent distress   EYES: pupils equal, round and reactive to light, sclera clear and conjunctiva normal  ENT: Normocephalic, without obvious abnormality, atraumatic  NECK: supple, symmetrical, no jugular venous distension and no carotid bruits   HEMATOLOGIC/LYMPHATIC: no cervical, supraclavicular or axillary lymphadenopathy   LUNGS: no increased work of breathing and clear to auscultation   CARDIOVASCULAR: regular rate and rhythm, normal S1 and S2, no murmur noted  ABDOMEN: normal bowel sounds x 4, soft, non-distended, non-tender, no masses palpated, no hepatosplenomegaly   MUSCULOSKELETAL: full range of motion noted, tone is normal  NEUROLOGIC: awake, alert, oriented to name, place and time. Motor skills grossly intact. SKIN: Normal skin color, texture, turgor and no jaundice. appears intact   EXTREMITIES: no LE edema. No cyanosis.      Labs:  Hematology:  Lab Results   Component Value Date    WBC 10.1 10/21/2020    RBC 4.35 10/21/2020    HGB 13.7 10/21/2020    HCT 41.3 10/21/2020    MCV 94.9 10/21/2020    MCH 31.5 (H) 10/21/2020    MCHC 33.2 10/21/2020    RDW 13.9 10/21/2020     10/21/2020    MPV 10.1 10/21/2020    SEGSPCT 59.6 10/12/2020    EOSRELPCT 1.1 10/12/2020    BASOPCT 0.4 10/12/2020    LYMPHOPCT 33.5 10/12/2020    MONOPCT 5.2 (H) 10/12/2020    SEGSABS 2.8 10/12/2020    EOSABS 0.1 10/12/2020    BASOSABS 0.0 10/12/2020    LYMPHSABS 1.6 10/12/2020    MONOSABS 0.2 10/12/2020    DIFFTYPE AUTOMATED DIFFERENTIAL 10/12/2020     Lab Results   Component Value Date    ESR 20 05/16/2012     Chemistry:  Lab Results   Component Value Date     10/21/2020    K 4.1 10/21/2020     10/21/2020    CO2 24 10/21/2020    BUN 16 10/21/2020    CREATININE 0.7 10/21/2020    GLUCOSE 93 10/21/2020    CALCIUM 9.0 10/21/2020    PROT 7.1 05/26/2020    LABALBU 4.6 05/26/2020    BILITOT 0.5 05/26/2020    ALKPHOS 148 (H) 05/26/2020    AST 17 05/26/2020    ALT 18 05/26/2020    LABGLOM >60 10/21/2020    GFRAA >60 10/21/2020    MG 2.1 10/21/2020     Lab Results   Component Value Date    TSHHS 0.806 05/16/2012     Immunology:  Lab Results   Component Value Date    PROT 7.1 05/26/2020     Coagulation Panel:  Lab Results   Component Value Date    PROTIME 10.8 (L) 10/12/2020    INR 0.89 10/12/2020    APTT 28.7 10/12/2020      Observations:  No data recorded        Assessment & Plan:    1. She had stage II adenocarcinoma of the lung. EGFR and ALK study was negative. She had surgery of the lung and was placed on adjuvant chemotherapy, cisplatin and Alimta. She completed adjuvant chemotherapy on May 21, 2014. CBC in June 2014 was within normal limits. CMP was stable for her with alk phos 157. CT chest in June 2016 was negative for recurrent disease. CBC and CMP in July 2016 were within normal limits. CT chest in April 2017 revealed no evidence of progression of the disease. CT chest in April 2018 revealed stable findings with 4 mm ground-glass nodule to the right upper lung. Follow-up CT chest in July 2018 was reviewed. She is agreeable to continue with active surveillance. CXR in April 2019 was non acute. CT chest in October 2019 showed stable findings with new 5 mm sub-solid nodule in the right middle lobe. CT chest in January 2020 was reviewed. CT chest in May 2020 showed enlarging right middle lung nodule. PET scan in June 2020 and CT chest in September 2020 were reviewed. She had bronchoscopy with biopsy of the right lung nodule which is positive for malignancy. She had pneumothorax which has resolved. She is scheduled for stereotactic radiotherapy in early November 2020 for 3 days. Follow-up CT chest will be scheduled by radiation oncologist..    2.  I advised her to keep her other age appropriate cancer screening up-to-date. She had bilateral mastectomy, and colonoscopy in 2014.    3.  We discussed about healthy diet and lifestyle. 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 We again discussed about smoking cessation. RTC in 7 weeks or sooner. All of their questions have been answered for today.     Recent imaging and labs were reviewed and discussed with the patient.       Electronically signed by Carina Cano MD on 9/8/20 at 8:55 AM EDT

## 2020-10-30 ENCOUNTER — OFFICE VISIT (OUTPATIENT)
Dept: ONCOLOGY | Age: 55
End: 2020-10-30
Payer: MEDICARE

## 2020-10-30 ENCOUNTER — HOSPITAL ENCOUNTER (OUTPATIENT)
Dept: INFUSION THERAPY | Age: 55
Discharge: HOME | End: 2020-10-30
Payer: MEDICARE

## 2020-10-30 ENCOUNTER — HOSPITAL ENCOUNTER (OUTPATIENT)
Dept: RADIATION ONCOLOGY | Age: 55
Discharge: HOME | End: 2020-10-30
Payer: MEDICARE

## 2020-10-30 VITALS
BODY MASS INDEX: 17.67 KG/M2 | TEMPERATURE: 96.8 F | HEART RATE: 98 BPM | HEIGHT: 60 IN | WEIGHT: 90 LBS | SYSTOLIC BLOOD PRESSURE: 151 MMHG | OXYGEN SATURATION: 97 % | DIASTOLIC BLOOD PRESSURE: 79 MMHG | RESPIRATION RATE: 16 BRPM

## 2020-10-30 VITALS
WEIGHT: 90 LBS | HEART RATE: 98 BPM | HEIGHT: 60 IN | DIASTOLIC BLOOD PRESSURE: 79 MMHG | OXYGEN SATURATION: 97 % | SYSTOLIC BLOOD PRESSURE: 151 MMHG | BODY MASS INDEX: 17.67 KG/M2 | TEMPERATURE: 96.8 F | RESPIRATION RATE: 16 BRPM

## 2020-10-30 PROBLEM — C34.2 MALIGNANT NEOPLASM OF MIDDLE LOBE, BRONCHUS OR LUNG (HCC): Status: ACTIVE | Noted: 2020-06-15

## 2020-10-30 PROCEDURE — 3017F COLORECTAL CA SCREEN DOC REV: CPT | Performed by: INTERNAL MEDICINE

## 2020-10-30 PROCEDURE — G8484 FLU IMMUNIZE NO ADMIN: HCPCS | Performed by: INTERNAL MEDICINE

## 2020-10-30 PROCEDURE — 4004F PT TOBACCO SCREEN RCVD TLK: CPT | Performed by: INTERNAL MEDICINE

## 2020-10-30 PROCEDURE — 99211 OFF/OP EST MAY X REQ PHY/QHP: CPT

## 2020-10-30 PROCEDURE — G8419 CALC BMI OUT NRM PARAM NOF/U: HCPCS | Performed by: INTERNAL MEDICINE

## 2020-10-30 PROCEDURE — 99214 OFFICE O/P EST MOD 30 MIN: CPT | Performed by: INTERNAL MEDICINE

## 2020-10-30 PROCEDURE — 1111F DSCHRG MED/CURRENT MED MERGE: CPT | Performed by: INTERNAL MEDICINE

## 2020-10-30 PROCEDURE — G8427 DOCREV CUR MEDS BY ELIG CLIN: HCPCS | Performed by: INTERNAL MEDICINE

## 2020-10-30 PROCEDURE — 99214 OFFICE O/P EST MOD 30 MIN: CPT | Performed by: RADIOLOGY

## 2020-10-30 ASSESSMENT — PAIN DESCRIPTION - ONSET: ONSET: ON-GOING

## 2020-10-30 ASSESSMENT — PAIN - FUNCTIONAL ASSESSMENT: PAIN_FUNCTIONAL_ASSESSMENT: PREVENTS OR INTERFERES SOME ACTIVE ACTIVITIES AND ADLS

## 2020-10-30 ASSESSMENT — PAIN DESCRIPTION - DESCRIPTORS: DESCRIPTORS: ACHING;CONSTANT

## 2020-10-30 ASSESSMENT — PAIN DESCRIPTION - PROGRESSION: CLINICAL_PROGRESSION: NOT CHANGED

## 2020-10-30 ASSESSMENT — PAIN DESCRIPTION - LOCATION: LOCATION: BACK;NECK

## 2020-10-30 ASSESSMENT — PAIN SCALES - GENERAL: PAINLEVEL_OUTOF10: 2

## 2020-10-30 ASSESSMENT — PAIN DESCRIPTION - FREQUENCY: FREQUENCY: CONTINUOUS

## 2020-10-30 ASSESSMENT — PAIN DESCRIPTION - PAIN TYPE: TYPE: CHRONIC PAIN

## 2020-10-30 NOTE — PLAN OF CARE
Radiation education and handouts given. Side effects and management reviewed with pt. All questions answered and pt voices understanding . Nursing Care Plan for Chest Radiation    Pain related to cancer diagnosis and treatment. Interventions   Pain assessment. Monitor pharmacological pain medication. Teach relaxation and repositioning techniques. Expected Outcome   Maintain patient's acceptable pain level or <5 on pain scale. Knowledge deficit related to diagnosis and treatment plan. Interventions   Assess patient's ability to comprehend diagnosis and treatment plan. Provide educational materials and teaching regarding plan of care. Provide emotional support and continued education. Refer to psychosocial coordinator if further assistance needed. Expected Outcome   Patient voices understanding of diagnosis and treatment plan. Altered respiratory status related to diagnosis and treatment. Interventions   Assess respiratory status, note changes. Educate patient on symptoms to report to staff. Refer to smoking cessation program if needed. Expected Outcome   No respiratory complications, patient with SPO2 >90% or equal to baseline. Altered skin integrity related to treatment. Interventions   Evaluation of skin integrity at therapy site. Advise patient on appropriate skin care. Instruct patient on recommended lotions/ointments/creams/dressing changes to use on therapy site. Expected Outcome   Minimize adverse skin reaction/infection at treatment site. Altered nutritional status due to treatment process. Interventions   Initial nutritional assessment. Assess weight and intake during treatment. Management of treatment side effects. Refer to nutritional class/evaluation. Expected Outcome   Patient's weight loss <10% from initial assessment. To re-evaluate weekly and one month post radiation treatments.

## 2020-10-30 NOTE — PROGRESS NOTES
loss, N/V, visual changes, focal neuro symptoms, changes in bowels/bladder      Past Medical History:   Diagnosis Date    Acid reflux     Anemia     Asthma     Cerebral artery occlusion with cerebral infarction ([facility]     COPD (chronic obstructive pulmonary disease) (HCC)     \"use to see Dr Kwabena Merchant but do not care for him\"    Full dentures     Gestational diabetes     \"had gestational diabetess 1984\"\"no trouble with sugar since then\"    History of kidney stones     \"passed a kidney stone approx 2016\"\"they said I had 3 stones and know for sure passed one\"    Hyperlipidemia     Lung cancer ([facility]     \"had left lung cancer - removed the lung- 2013= tx with chemo treatments- follow with Dr Tiny Portillo"    Lung nodule     \"one spot on right lung- having bronchoscopy\"( 10/16/2020)    Pneumonia 2018    emphysema, COPD    Pneumothorax     TIA (transient ischemic attack)     \"had TIA in 2012- passed out had weakness right side,affected my memory- took approx 6 months for everything to come back\"        Past Surgical History:   Procedure Laterality Date    BREAST SURGERY  2012    double mastectomy (fibroid) after several lumpectomies    BRONCHOSCOPY N/A 10/20/2020    BRONCHOSCOPY ISAAC performed by Sylvia Tan MD at [facility]  07/2014    CT GUIDED CHEST TUBE  10/20/2020    CT GUIDED CHEST TUBE 10/20/2020 [facility] CT SCAN    HERNIA REPAIR  2010    ventral hernia repair    LUNG BIOPSY Left 01/02/2014    Left upper lobe mass    LUNG SURGERY Left 02/27/2014    Left peunomectomy with lymph node sampling    LUNG SURGERY  2001    MEDIASTINOSCOPY  02/14/2014    bx lymph nodes    TONSILLECTOMY  as a kid       Family History   Problem Relation Age of Onset    Cancer Father         lung cancer    Heart Disease Father     Substance Abuse Father     Heart Attack Father     Depression Paternal Aunt     Arthritis Maternal Grandfather     Diabetes Maternal Grandfather     High Blood Pressure Maternal Grandfather     Vision Loss Maternal Grandfather     Cancer Paternal Grandfather     High Cholesterol Mother     Cancer Mother         throat cancer    Heart Disease Mother     Miscarriages / [place] Sister        Social History     Socioeconomic History    Marital status:      Spouse name: Not on file    Number of children: Not on file    Years of education: Not on file    Highest education level: Not on file   Occupational History    Not on file   Social Needs    Financial resource strain: Not on file    Food insecurity     Worry: Not on file     Inability: Not on file   [employer] needs     Medical: Not on file     Non-medical: Not on file   Tobacco Use    Smoking status: Current Every Day Smoker     Packs/day: 0.25     Years: 43.00     Pack years: 10.75     Types: Cigarettes     Start date: 9/9/1977    Smokeless tobacco: Never Used    Tobacco comment: \"the most every smoke was a pack per day \"   Substance and Sexual Activity    Alcohol use: Yes     Comment: 2 drinks a month    Drug use: Yes     Types: Marijuana     Comment: daily- does not have medical marijuana card\"age 20's use to do coke- last did age 17\"    Sexual activity: Yes     Partners: Male   Lifestyle    Physical activity     Days per week: Not on file     Minutes per session: Not on file    Stress: Not on file   Relationships    Social connections     Talks on phone: Not on file     Gets together: Not on file     Attends [employer] service: Not on file     Active member of club or organization: Not on file     Attends meetings of clubs or organizations: Not on file     Relationship status: Not on file    Intimate partner violence     Fear of current or ex partner: Not on file     Emotionally abused: Not on file     Physically abused: Not on file     Forced sexual activity: Not on file   Other Topics Concern    Not on file   Social History Narrative    Not on file         MEDICATIONS: Medication List      ASK your doctor about these medications     Aleve 220 MG Caps; Generic drug: Naproxen Sodium   carvedilol 3.125 MG tablet; Commonly known as: COREG; Take 1 tablet by   mouth 2 times daily (with meals)   ipratropium 0.02 % nebulizer solution; Commonly known as: ATROVENT; Take   2.5 mLs by nebulization 4 times daily         REVIEW OF SYSTEMS:  Pertinent positive and negatives in History: the remainder of the 14-pt ROS is negative.       EXAMINATION:   Vitals:    10/30/20 0817   BP: (!) 151/79   Pulse: 98   Resp: 16   Temp: 96.8 °F (36 °C)   SpO2: 97%     A&O x3, NAD  Sclera anicteric, EOMI  No cervical/SCV LAD  HR RRR, no m/r/g  Left BS absent, Rt lung CTA  Abd soft, NTND  No UE/LE edema  No spinal or other bony tenderness to firm palpation  Nl mood/affect/judgement  CN 2-12 grossly intact without focal deficit      LABORATORY STUDIES:   CBC:   Lab Results   Component Value Date    WBC 10.1 10/21/2020    RBC 4.35 10/21/2020    HGB 13.7 10/21/2020    HCT 41.3 10/21/2020    MCV 94.9 10/21/2020    MCH 31.5 10/21/2020    MCHC 33.2 10/21/2020    RDW 13.9 10/21/2020     10/21/2020    MPV 10.1 10/21/2020     CMP:  Lab Results   Component Value Date     10/21/2020    K 4.1 10/21/2020     10/21/2020    CO2 24 10/21/2020    BUN 16 10/21/2020    CREATININE 0.7 10/21/2020    GFRAA >60 10/21/2020    LABGLOM >60 10/21/2020    GLUCOSE 93 10/21/2020    PROT 7.1 05/26/2020    LABALBU 4.6 05/26/2020    CALCIUM 9.0 10/21/2020    BILITOT 0.5 05/26/2020    ALKPHOS 148 05/26/2020    AST 17 05/26/2020    ALT 18 05/26/2020       RADIOLOGIC STUDIES:  Xr Chest (2 Vw)    Result Date: 10/26/2020  EXAMINATION: TWO XRAY VIEWS OF THE CHEST 10/26/2020 3:49 pm COMPARISON: 10/24/2020 HISTORY: ORDERING SYSTEM PROVIDED HISTORY: Malignant neoplasm of upper lobe bronchus, left [facility]) TECHNOLOGIST PROVIDED HISTORY: Acuity: Acute Type of Exam: Subsequent/Follow-up Additional signs and symptoms: Follow up collapsed lung FINDINGS: There is marked mediastinal shift to the left with complete opacification of the left hemithorax, unchanged and likely due to prior pneumonectomy. Right lung is hyperinflated. Pigtail catheter/drain in the right lower hemithorax is unchanged. There is no pneumothorax. Left hemidiaphragm remains elevated. Bones are unchanged. No significant overall change. Fl Less Than 1 Hour    Result Date: 10/20/2020  Radiology exam is complete. No Radiologist dictation. Please follow up with ordering provider. Xr Chest Portable    Result Date: 10/24/2020  EXAMINATION: ONE XRAY VIEW OF THE CHEST 10/24/2020 5:53 pm COMPARISON: None. HISTORY: ORDERING SYSTEM PROVIDED HISTORY: f/u right pneumothorax (pneumostat valve remains on) TECHNOLOGIST PROVIDED HISTORY: Reason for exam:->f/u right pneumothorax (pneumostat valve remains on) Reason for Exam: f/u right pneumothorax (pneumostat valve remains on) Acuity: Unknown Type of Exam: Ongoing Additional signs and symptoms: unknown Relevant Medical/Surgical History: COPD, asthma FINDINGS: The right hemithorax is hyper expanded due to complete opacification of the left hemithorax with tracheal-mediastinal shift to the left. The right pneumothorax noted earlier today has diminished in size. 1.8 cm of separation was noted at the right apex. The right lateral chest component has resolved. The right sided chest catheter remains unchanged in position. Complete opacification of the left hemithorax with volume loss manifested by tracheal-mediastinal shift to the left. Right sided chest catheter remains unchanged in position. The right pneumothorax noted earlier today has diminished in size. 1.8 cm of separation is all that remained in the right apex.      Xr Chest Portable    Result Date: 10/24/2020  EXAMINATION: ONE XRAY VIEW OF THE CHEST 10/24/2020 12:10 pm COMPARISON: 10/24/2020 HISTORY: ORDERING SYSTEM PROVIDED HISTORY: 2 hours after placing pneumostat valve to chest tube, f/o pneumothorax TECHNOLOGIST PROVIDED HISTORY: Reason for exam:->2 hours after placing pneumostat valve to chest tube, f/o pneumothorax Follow-up exam FINDINGS: Stable postsurgical changes from left pneumonectomy with stable elevation left hemidiaphragm. Right pleural catheter is stable in positioning. There is a small right pneumothorax measuring approximately 9 mm from the right lateral chest wall and approximately 12 mm in the right apex. A cardiac scratched of the cardiomediastinal silhouette is otherwise not well seen but stable. Osseous structures otherwise stable. Right pleural catheter stable in positioning. There is a small right pneumothorax on this exam measuring up to 11-12 mm. The findings were sent to the Radiology Results [address] at 1:11 pm on 10/24/2020to be communicated to a licensed caregiver. Xr Chest Portable    Result Date: 10/24/2020  EXAMINATION: ONE XRAY VIEW OF THE CHEST 10/24/2020 4:37 am COMPARISON: 10/23/2020 HISTORY: ORDERING SYSTEM PROVIDED HISTORY: ptx TECHNOLOGIST PROVIDED HISTORY: Reason for exam:->ptx Reason for Exam: ptx Acuity: Unknown Type of Exam: Unknown FINDINGS: The sequelae of left pneumonectomy is noted. There is expected ipsilateral mediastinal shift to the left. There is a small bore right chest tube along right lung base. No pneumothorax is identified. The right lung is clear. Stable appearance of right chest tube. There is no pneumothorax identified. Xr Chest Portable    Result Date: 10/23/2020  EXAMINATION: ONE XRAY VIEW OF THE CHEST 10/23/2020 5:48 am COMPARISON: 10/22/2020. HISTORY: ORDERING SYSTEM PROVIDED HISTORY: PTX TECHNOLOGIST PROVIDED HISTORY: Reason for exam:->PTX Reason for Exam: PTX Acuity: Unknown Type of Exam: Unknown FINDINGS: Again seen is a right inferior pigtail chest tube, as seen on the prior study.   Previously seen right pneumothorax appears to have resolved without perceptible residual pneumothorax appreciated on this study. There is redemonstration of changes of left pneumonectomy as seen on the prior study. There is mediastinal shift to the left as seen on the prior study. Right inferior chest tube. No perceptible pneumothorax is seen on this study. .  Redemonstration of changes of left pneumonectomy. Xr Chest Portable    Result Date: 10/22/2020  EXAMINATION: ONE XRAY VIEW OF THE CHEST 10/22/2020 6:11 am COMPARISON: 10/21/2020 HISTORY: ORDERING SYSTEM PROVIDED HISTORY: CT to [facility] TECHNOLOGIST PROVIDED HISTORY: Reason for exam:->CT to [facility] Reason for Exam: CT to [facility] Acuity: Acute Type of Exam: Subsequent/Follow-up FINDINGS: Status post left pneumonectomy. Pigtail chest tube at the right lung base. A moderate-sized pneumothorax is present. Suspected emphysematous changes. No consolidations. Heart size cannot be discerned. Moderate sized right pneumothorax, increased from the prior exam.     Xr Chest Portable    Result Date: 10/22/2020  EXAMINATION: ONE XRAY VIEW OF THE CHEST 10/21/2020 6:06 am COMPARISON: Chest radiograph dated October 20, 2020 HISTORY: ORDERING SYSTEM PROVIDED HISTORY: post placement of right chest tube TECHNOLOGIST PROVIDED HISTORY: Reason for exam:->post placement of right chest tube Reason for Exam: post placement of right chest tube Acuity: Unknown Type of Exam: Unknown FINDINGS: There has been a left pneumonectomy with shift of the heart and mediastinum to the left. Right-sided chest tube is noted. There has been decrease in the size of the previously seen pneumothorax. Small right apically pneumothorax is seen. Small right apical pneumothorax. There has been interval improvement since the prior study. Right-sided chest tube in place. Postsurgical changes from left pneumonectomy.      Xr Chest Portable    Result Date: 10/20/2020  EXAMINATION: ONE XRAY VIEW OF THE CHEST 10/20/2020 2:01 pm COMPARISON: 04/12/2019 HISTORY: ORDERING SYSTEM PROVIDED HISTORY: erythema, desquamation, radiation pneumonitis, radiation fibrosis, bronchial stricture, atelectasis, radiation esophagitis, esophageal stricture, pericarditis, pericardial effusion, spinal cord myelopathy, intercostal neuropathy with chronic pain, rib fracture, risk of secondary malignancy  Pt agreeable to treatment and CT simulation to be scheduled in order to begin treatment planning. TIME SPENT WITH PATIENT: 30 minutes spent in this encounter, >50% involved face-to-face counseling and coordination of care.     Electronically signed by Electronically signed by Pratik Bautista MD on 10/30/2020 at 9:06 AM

## 2020-10-30 NOTE — PROGRESS NOTES
MA Rooming Questions  Patient: Therman Counter  MRN: [id number]    Date: 10/30/2020        1. Do you have any new issues?   no         2. Do you need any refills on medications?    no    3. Have you had any imaging done since your last visit?   no    4. Have you been hospitalized or seen in the emergency room since your last visit here?   no    5. Did the patient have a depression screening completed today?  No    No data recorded     PHQ-9 Given to (if applicable):               PHQ-9 Score (if applicable):                     [] Positive     []  Negative              Does question #9 need addressed (if applicable)                     [] Yes    []  No               Dionte Anaya MA

## 2020-10-30 NOTE — PROGRESS NOTES
Chuck Wattsling  10/30/2020    Patient is seen today for follow up. Vitals:    10/30/20 0817   BP: (!) 151/79   Pulse: 98   Resp: 16   Temp: 96.8 °F (36 °C)   SpO2: 97%        Oxygen Therapy  SpO2: 97 %  Pulse Oximeter Device Location: Right, Finger  O2 Device: None (Room air)  Skin Assessment: Clean, dry, & intact    Wt Readings from Last 3 Encounters:   10/30/20 90 lb (40.8 kg)   10/26/20 92 lb (41.7 kg)   10/23/20 92 lb 1.6 oz (41.8 kg)       Pain Assessment  Pain Assessment: 0-10  Pain Level: 2  Patient's Stated Pain Goal: No pain  Pain Type: Chronic pain  Pain Location: Back, Neck  Pain Descriptors: Aching, Constant  Pain Frequency: Continuous  Pain Onset: On-going  Clinical Progression: Not changed  Functional Pain Assessment: Prevents or interferes some active activities and ADLs  Multiple Pain Sites: No  OTC Analgesics       Allergies   Allergen Reactions    Erythromycin Other (See Comments)     Was rushed to hospital, heart palpitations      Macrolides And Ketolides     Tape [Adhesive Tape]      blisters    Codeine Nausea And Vomiting    Vicodin [Hydrocodone-Acetaminophen] Nausea And Vomiting        Current Outpatient Medications   Medication Sig Dispense Refill    Naproxen Sodium (ALEVE) 220 MG CAPS Take 1 capsule by mouth every 6 hours as needed for Pain      ipratropium (ATROVENT) 0.02 % nebulizer solution Take 2.5 mLs by nebulization 4 times daily 2.5 mL 5    carvedilol (COREG) 3.125 MG tablet Take 1 tablet by mouth 2 times daily (with meals) (Patient not taking: Reported on 10/30/2020) 60 tablet 3     No current facility-administered medications for this encounter. Additional Comments: PT here for routine check up, no new complaints or concerns.     Electronically signed by Lorena Jacobs MA on 10/30/2020 at 8:23 AM

## 2020-11-11 NOTE — DISCHARGE SUMMARY
Final diagnosis: pneumothorax following bronchoscopy biopsy    Procedure flexible endoscopy per Dr. Shane Mcdaniel  Chest tube placement for pneumothorax    Hospital course      Patient is admitted following bronchoscopy due to pneumothorax and chest tube placement    Hospitalization his respiratory status cardiopulmonary status monitored and chest tube was managed with underwater seal  During the course of the hospitalization the chest air leak decreased but still persistent a Heimlich valve was placed on the day of discharge and a follow-up chest x-ray was performed    Patient ambulated tolerated diet and required no oxygen  He was discharged request with instructions for management of the Heimlich valve  Instructed to follow-up with an office with a chest x-ray  Instructed to report if  there is any problem such as pain in the site of chest tube insertion shortness of breath and difficulty with active activity    ;

## 2020-11-12 ENCOUNTER — HOSPITAL ENCOUNTER (OUTPATIENT)
Dept: RADIATION ONCOLOGY | Age: 55
Discharge: HOME | End: 2020-11-12
Attending: RADIOLOGY
Payer: MEDICARE

## 2020-11-12 PROCEDURE — 77290 THER RAD SIMULAJ FIELD CPLX: CPT | Performed by: RADIOLOGY

## 2020-11-12 PROCEDURE — 77263 THER RADIOLOGY TX PLNG CPLX: CPT | Performed by: RADIOLOGY

## 2020-11-12 PROCEDURE — 77334 RADIATION TREATMENT AID(S): CPT | Performed by: RADIOLOGY

## 2020-11-12 PROCEDURE — 77470 SPECIAL RADIATION TREATMENT: CPT | Performed by: RADIOLOGY

## 2020-11-23 LAB
CULTURE: NORMAL
FUNGUS STAIN: NORMAL
Lab: NORMAL
SPECIMEN: NORMAL

## 2020-11-30 PROCEDURE — 77293 RESPIRATOR MOTION MGMT SIMUL: CPT | Performed by: RADIOLOGY

## 2020-11-30 PROCEDURE — 77338 DESIGN MLC DEVICE FOR IMRT: CPT | Performed by: RADIOLOGY

## 2020-11-30 PROCEDURE — 77301 RADIOTHERAPY DOSE PLAN IMRT: CPT | Performed by: RADIOLOGY

## 2020-11-30 PROCEDURE — 77300 RADIATION THERAPY DOSE PLAN: CPT | Performed by: RADIOLOGY

## 2020-11-30 PROCEDURE — 99999 PR OFFICE/OUTPT VISIT,PROCEDURE ONLY: CPT | Performed by: RADIOLOGY

## 2020-12-02 ENCOUNTER — HOSPITAL ENCOUNTER (OUTPATIENT)
Dept: RADIATION ONCOLOGY | Age: 55
Discharge: HOME | End: 2020-12-02
Attending: RADIOLOGY
Payer: MEDICARE

## 2020-12-02 PROCEDURE — 99999 PR OFFICE/OUTPT VISIT,PROCEDURE ONLY: CPT | Performed by: RADIOLOGY

## 2020-12-02 PROCEDURE — 77435 SBRT MANAGEMENT: CPT | Performed by: RADIOLOGY

## 2020-12-02 PROCEDURE — 77300 RADIATION THERAPY DOSE PLAN: CPT | Performed by: RADIOLOGY

## 2020-12-02 PROCEDURE — 77373 STRTCTC BDY RAD THER TX DLVR: CPT | Performed by: RADIOLOGY

## 2020-12-02 NOTE — PROGRESS NOTES
Weekly Radiation Treatment Progress Note    DATE OF SERVICE: 12/2/2020     DIAGNOSIS:  Cancer Staging  Malignant neoplasm of middle lobe, bronchus or lung (HCC)  Staging form: Lung, AJCC 8th Edition  - Clinical: Stage IA1 (cT1a, cN0, cM0) - Unsigned    Malignant neoplasm of upper lobe bronchus, left (HCC)  Staging form: Lung, AJCC 7th Edition  - Pathologic stage from 3/4/2014: Stage IIB (T3, N0, cM0) - Unsigned       TREATMENT COURSE:   Oncology History   Malignant neoplasm of upper lobe bronchus, left ([address]   12/30/2013 Initial Diagnosis    Malignant neoplasm of upper lobe bronchus, left ([address]     3/2014 Surgery    Left Pneumonectomy + MLND     3/16/2014 -  Chemotherapy    Carboplatin/Alimta      Radiation    The patient saw No care team member to display for radiation treatment. This is the current list of radiation treatment:  Radiation Treatments     No radiation treatments to show. (Treatments may have been administered in another system.)             Malignant neoplasm of middle lobe, bronchus or lung ([facility] [address]   6/15/2020 Initial Diagnosis    Malignant neoplasm of middle lobe, bronchus or lung ([address]     10/20/2020 Biopsy    Biopsy confirming NSCLC in RML     12/2/2020 -  Radiation    SBRT to RML    18 Gy x 3 = 54 Gy  6MV photons with no flattening filter prescribed to periphery of tumor  SBRT (VMAT) using 2 arcs           Site: [facility]  Current Radiation Dose: 1800 cGy    Subjective:    Doing well  1st fx today - no complaints    EXAM  There were no vitals filed for this visit.   NAD    Setup images, chart, plan reviewed      A/P:   Tolerating tx well  Continue RT as planned    RTC 3 months after EOT with Chest CT      Electronically signed by Myles Benito MD on 12/2/2020 at 3:17 PM

## 2020-12-04 ENCOUNTER — HOSPITAL ENCOUNTER (OUTPATIENT)
Dept: RADIATION ONCOLOGY | Age: 55
Discharge: HOME | End: 2020-12-04
Attending: RADIOLOGY
Payer: MEDICARE

## 2020-12-04 PROCEDURE — 77373 STRTCTC BDY RAD THER TX DLVR: CPT | Performed by: RADIOLOGY

## 2020-12-04 PROCEDURE — 99999 PR OFFICE/OUTPT VISIT,PROCEDURE ONLY: CPT | Performed by: RADIOLOGY

## 2020-12-07 ENCOUNTER — HOSPITAL ENCOUNTER (OUTPATIENT)
Dept: RADIATION ONCOLOGY | Age: 55
Discharge: HOME | End: 2020-12-07
Attending: RADIOLOGY
Payer: MEDICARE

## 2020-12-07 PROCEDURE — 99999 PR OFFICE/OUTPT VISIT,PROCEDURE ONLY: CPT | Performed by: RADIOLOGY

## 2020-12-07 PROCEDURE — 77373 STRTCTC BDY RAD THER TX DLVR: CPT | Performed by: RADIOLOGY

## 2020-12-07 PROCEDURE — 77336 RADIATION PHYSICS CONSULT: CPT | Performed by: RADIOLOGY

## 2020-12-07 ASSESSMENT — PAIN SCALES - GENERAL: PAINLEVEL_OUTOF10: 0

## 2020-12-07 NOTE — PROGRESS NOTES
Radiation Oncology  Treatment Completion Summary  Encounter Date: 2020 9:34 AM    Ms. Eileen Peña is a 63 y.o. female  : 1965  MRN: [id number]  [facility] Number: [de-identified]      FOLLOW UP PHYSICIANS:   Primary Care: Fariba Luo MD       DIAGNOSIS:    Cancer Staging  Malignant neoplasm of middle lobe, bronchus or lung ([address]  Staging form: Lung, AJCC 8th Edition  - Clinical: Stage IA1 (cT1a, cN0, cM0) - Unsigned    Malignant neoplasm of upper lobe bronchus, left ([address]  Staging form: Lung, AJCC 7th Edition  - Pathologic stage from 3/4/2014: Stage IIB (T3, N0, cM0) - Unsigned         TREATMENT COURSE:   Oncology History   Malignant neoplasm of upper lobe bronchus, left ([address]   2013 Initial Diagnosis    Malignant neoplasm of upper lobe bronchus, left ([address]     3/2014 Surgery    Left Pneumonectomy + MLND     3/16/2014 -  Chemotherapy    Carboplatin/Alimta      Radiation    The patient saw No care team member to display for radiation treatment. This is the current list of radiation treatment:  Radiation Treatments     No radiation treatments to show. (Treatments may have been administered in another system.)             Malignant neoplasm of middle lobe, bronchus or lung ([address]   6/15/2020 Initial Diagnosis    Malignant neoplasm of middle lobe, bronchus or lung ([address]     10/20/2020 Biopsy    Biopsy confirming NSCLC in RML     2020 - 2020 Radiation    SBRT to RML    18 Gy x 3 = 54 Gy  6MV photons with no flattening filter prescribed to periphery of tumor  SBRT (VMAT) using 2 arcs           HISTORY:  Eileen Peña is a 63 y.o. female with the above referenced diagnosis. For complete details on history of present illness please see my initial consultation note.   A course of stereotactic ablative radiation therapy to the right middle lung was recently completed with details provided above:       TREATMENT TOLERANCE:   Tolerated therapy well without acute toxicity      FOLLOW-UP PLANS: RTC 3 months with surveillance Chest CT  Close f/u with medonc      Electronically signed by Electronically signed by Silvia Heaton MD on 12/7/2020 at 9:34 AM

## 2020-12-11 NOTE — PROGRESS NOTES
Patient Name: Rocael Crane  Patient : 1965  Patient MRN: [id number]     Primary Oncologist: Madison Devlin MD  Referring Provider: Danyel Horton MD     Date of Service: 2020      Chief Complaint:   Chief Complaint   Patient presents with    Follow-up     She came in for follow-up visit. Patient Active Problem List:     Syncope     Tobacco use disorder     Hyperlipidemia     Hemiparesis (HCC)     Malignant neoplasm of upper lobe bronchus, left (HCC)     Pulmonary nodule     HPI:   Darlin Mack is a pleasant 76-year-old female patient with history of lung surgery due to the bullae in , performed by Dr. Taylor Diana to prevent lung collapse, and high-grade adenocarcinoma of the lung, status post left upper lobe mass biopsy on 2014. The pathology report showed high-grade carcinoma with abundant necrosis. Immunostain study showed that the tumor cells were positive for CK7 and TTF1 and negative for CK5-6, Napsin A, and B63. The tumor was consistent with high-grade adenocarcinoma of the lung origin. EGFR and ALK rearrangement studies were negative. She went to the emergency room in 2013 with complaint of pain to the ribcage. A CT of the chest without contrast on 2013 showed a 6.2 by 4.8 by 6.9 cm partially calcified large infiltrative mass involving the left upper lobe and multiple small mediastinal and left hilar nodes. Metastatic disease could not be excluded. Indeterminate hypodense lesions within the liver and involving the pancreas were described. Left adrenal gland was mildly hyperplastic. CT abdomen and pelvis on 2013 showed benign appearing left hepatic lobe mass, which was seen in 2007. This was probably an hemangioma. A smaller satellite nodule appeared benign and was probably a cyst.  No compelling evidence for solid organ metastases. There was no evidence of abnormal enhancement in the pancreatic head or neck. Benign appearing simple cysts in the upper pole of the left kidney and bilateral nephrolithiasis were described. I discussed with her,  therefore, biopsy of the liver was not done. Blood test in December 2013 showed sodium 135, potassium 3.8, bun 17, creatinine 0.7, white cell 5.4, hemoglobin 14.2, platelet 262. CMP was December 24, 2013, showed normal sodium at 140, potassium 4.0, BUN 20, creatinine 0.7, calcium 9.8, alk phos 136, ALT 16, AST 21, albumin 4.1, total protein 7.6, total bilirubin 0.4. Pulmonary function test was okay. She was seen by Dr. Lizz Escobar at [facility], who  ordered pretest study on February 7, 2014, including MRI of the brain, echo, and stress test.    PET-CT scan on January 13, 2014 showed:  1. Large left lung neoplasm., 2. Probable left hilar and mediastinal puja metastases. , 3. Asymmetric hypermetabolic activity involving the cecum. CT brain on January 8, 2014 was a normal study. Mediastinoscopy on February 14, 2014 showed: reportedly all nodes sampled were negative for the cancer. She had left lung pneumonectomy with lymph node sampling on February 27, 2014. Pathology report showed solid predominant adenocarcinoma measuring 7.9 by 6.2 by 5.1 cm in the left upper lobe, grade 3. No visceral invasion present. The tumor was confined to the lung parenchyma. The bronchial margin was negative. There was extensive lymphatic/vascular invasion and one in two lymph nodes was negative for malignancy and pathologically she was staged as T3, N0, MX.  ALK study was negative for rearrangements. She was recommended to consider adjuvant chemotherapy. I went over the NCCN Guideline and I put her on cisplatin/Alimta regimen. She received first adjuvant treatment with Alimta/cisplatin on March 19, 2014, and completed the fourth cycle of adjuvant cisplatinum/Alimta on May 21, 2014.     Blood test in May 2014 showed white cell count of 3.7, hemoglobin 13.3, platelet 940, BUN 16, creatinine prior exams but had a more subtle ground-glass appearance on the prior exams. Recommend a short-term follow-up in 3 months versus per clinical protocol. 3. Emphysematous changes. Maternal aunt had recurrent lung cancer. CT chest in January 2020 showed stable 6 mm irregular RML nodule, more solid concerning for malignancy. Too small for PET or biopsy. She went to [address] [place] [address].  CT chest on May 4, 2020 showed slight increased size of the spiculated subpleural right middle lobe nodule, now approximately 10 mm maximal diameter previously 6 mm. Other ill-defined groundglass nodules in the right upper lobe and right lower lobe appeared similar. Suspect simple appearing left renal and left hepatic cyst, nonobstructing right nephrolithiasis. I discussed with Dr. Chalo Clemente, thoracic surgeon who is agreeable with PET CT scan. She smokes few cigarettes a day. She decided that she may not consider any chemotherapy, should the disease come back again  CBC was stable. CMP showed increased alkaline phosphatase. PET CT scan in June 2020 and CT chest in September 2020 were reviewed. Biopsy will be difficult. Due to enlarging nodule I have referred to radiation oncologist for potential stereotactic radiotherapy. On December 18, 2020 she came in for follow-up visit. She received stereotactic radiotherapy for 3 days and completed on November 30, 2020. She is scheduled for follow-up imaging study on March 8, 2021. She is agreeable to come back and see me and radiation oncologist on March 10, 2021. She has chronic arthritic pain. She felt okay today. Energy level is fine. She denied any weight loss or night sweats. .  She denied any night sweats, fever, or chills. No hemoptysis. No nausea, vomiting, dysuria, or hematuria.       PAST MEDICAL HISTORY:  Lung disease, status post lung surgery in 2004, history of bilateral mastectomy due to the frequent lumpectomies in 2005 by Dr. Rose Samuel,  cholecystectomy in [address] [place] [address] CVA in 2012. Dyslipidemia. FAMILY HISTORY:  Father had lung cancer and he was a smoker. Mother had throat cancer and colon cancer in her seventies. Maternal grandfather had colon cancer in his eighties. One maternal aunt had lung cancer. She stated one cousin was diagnosed with pancreatic cancer. SOCIAL HISTORY:  She smoked about one pack per day for 35 years and she is using electronic cigarettes. She drinks alcohol occasionally. She is  and has one child. She has been retired since 2014    LABORATORY STUDIES:   January 6, 2014: White cell count 9.2, hemoglobin 13.4, platelet 999, BUN 17, creatinine 0.6, AST 20, ALT 11, calcium 9.6, alk phos 113. Review of Systems: \"Per interval history; otherwise 10 point ROS is negative. \"     Vital Signs:  /78 (Site: Left Upper Arm, Position: Sitting, Cuff Size: Medium Adult)   Pulse 105   Resp 18   Ht 5' (1.524 m)   Wt 94 lb 9.6 oz (42.9 kg)   LMP 01/12/2010   SpO2 97%   BMI 18.48 kg/m²     Physical Exam:  CONSTITUTIONAL: awake, alert, cooperative, no apparent distress   EYES: pupils equal, round and reactive to light, sclera clear and conjunctiva normal  ENT: Normocephalic, without obvious abnormality, atraumatic  NECK: supple, symmetrical, no jugular venous distension and no carotid bruits   HEMATOLOGIC/LYMPHATIC: no cervical, supraclavicular or axillary lymphadenopathy   LUNGS: no increased work of breathing and clear to auscultation  BREAST: Status post bilateral mastectomy. CARDIOVASCULAR: regular rate and rhythm, normal S1 and S2, no murmur noted  ABDOMEN: normal bowel sounds x 4, soft, non-distended, non-tender, no masses palpated, no hepatosplenomegaly   MUSCULOSKELETAL: full range of motion noted, tone is normal  NEUROLOGIC: awake, alert, oriented to name, place and time. Cranial nerves II through XII grossly intact. SKIN: Normal skin color, texture, turgor and no jaundice. appears intact   EXTREMITIES: no LE edema. No cyanosis. Labs:  Hematology:  Lab Results   Component Value Date    WBC 10.1 10/21/2020    RBC 4.35 10/21/2020    HGB 13.7 10/21/2020    HCT 41.3 10/21/2020    MCV 94.9 10/21/2020    MCH 31.5 (H) 10/21/2020    MCHC 33.2 10/21/2020    RDW 13.9 10/21/2020     10/21/2020    MPV 10.1 10/21/2020    SEGSPCT 59.6 10/12/2020    EOSRELPCT 1.1 10/12/2020    BASOPCT 0.4 10/12/2020    LYMPHOPCT 33.5 10/12/2020    MONOPCT 5.2 (H) 10/12/2020    SEGSABS 2.8 10/12/2020    EOSABS 0.1 10/12/2020    BASOSABS 0.0 10/12/2020    LYMPHSABS 1.6 10/12/2020    MONOSABS 0.2 10/12/2020    DIFFTYPE AUTOMATED DIFFERENTIAL 10/12/2020     Lab Results   Component Value Date    ESR 20 05/16/2012     Chemistry:  Lab Results   Component Value Date     10/21/2020    K 4.1 10/21/2020     10/21/2020    CO2 24 10/21/2020    BUN 16 10/21/2020    CREATININE 0.7 10/21/2020    GLUCOSE 93 10/21/2020    CALCIUM 9.0 10/21/2020    PROT 7.1 05/26/2020    LABALBU 4.6 05/26/2020    BILITOT 0.5 05/26/2020    ALKPHOS 148 (H) 05/26/2020    AST 17 05/26/2020    ALT 18 05/26/2020    LABGLOM >60 10/21/2020    GFRAA >60 10/21/2020    MG 2.1 10/21/2020     Lab Results   Component Value Date    TSHHS 0.806 05/16/2012     Immunology:  Lab Results   Component Value Date    PROT 7.1 05/26/2020     Coagulation Panel:  Lab Results   Component Value Date    PROTIME 10.8 (L) 10/12/2020    INR 0.89 10/12/2020    APTT 28.7 10/12/2020      Observations:  No data recorded        Assessment & Plan:    1. She had stage II adenocarcinoma of the lung. EGFR and ALK study was negative. She had surgery of the lung and was placed on adjuvant chemotherapy, cisplatin and Alimta. She completed adjuvant chemotherapy on May 21, 2014. CBC in June 2014 was within normal limits. CMP was stable for her with alk phos 157. CT chest in June 2016 was negative for recurrent disease. CBC and CMP in July 2016 were within normal limits.     CT chest in April 2017 revealed no evidence of progression of the disease. CT chest in April 2018 revealed stable findings with 4 mm ground-glass nodule to the right upper lung. Follow-up CT chest in July 2018 was reviewed. CXR in April 2019 was non acute. CT chest in October 2019 showed stable findings with new 5 mm sub-solid nodule in the right middle lobe. CT chest in January 2020 was reviewed. CT chest in May 2020 showed enlarging right middle lung nodule. PET scan in June 2020 and CT chest in September 2020 were reviewed. Since the nodule continues to get larger. I referred to see radiation oncologist for potential stereotactic radiotherapy. She had stereotactic radiotherapy until November 30, 2020. She will have follow-up imaging study in March 2021.    2.  I advised her to keep her other age appropriate cancer screening up-to-date. She had bilateral mastectomy, and colonoscopy in 2014.    3.  We discussed about healthy diet and lifestyle. 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 We again discussed about smoking cessation. I recommend flu shot. RTC in March 2021 or sooner. All of their questions have been answered for today. Recent imaging and labs were reviewed and discussed with the patient.       Electronically signed by Yenifer Garcia MD on 9/8/20 at 8:55 AM AMINAT

## 2020-12-18 ENCOUNTER — HOSPITAL ENCOUNTER (OUTPATIENT)
Dept: INFUSION THERAPY | Age: 55
Discharge: HOME | End: 2020-12-18
Payer: MEDICARE

## 2020-12-18 ENCOUNTER — OFFICE VISIT (OUTPATIENT)
Dept: ONCOLOGY | Age: 55
End: 2020-12-18
Payer: MEDICARE

## 2020-12-18 VITALS
DIASTOLIC BLOOD PRESSURE: 78 MMHG | HEART RATE: 105 BPM | WEIGHT: 94.6 LBS | BODY MASS INDEX: 18.57 KG/M2 | HEIGHT: 60 IN | OXYGEN SATURATION: 97 % | RESPIRATION RATE: 18 BRPM | SYSTOLIC BLOOD PRESSURE: 116 MMHG

## 2020-12-18 DIAGNOSIS — C34.2 MALIGNANT NEOPLASM OF MIDDLE LOBE, BRONCHUS OR LUNG (HCC): ICD-10-CM

## 2020-12-18 LAB
ALBUMIN SERPL-MCNC: 4.5 GM/DL (ref 3.4–5)
ALP BLD-CCNC: 130 IU/L (ref 40–129)
ALT SERPL-CCNC: 21 U/L (ref 10–40)
ANION GAP SERPL CALCULATED.3IONS-SCNC: 14 MMOL/L (ref 4–16)
AST SERPL-CCNC: 22 IU/L (ref 15–37)
BASOPHILS ABSOLUTE: 0 K/CU MM
BASOPHILS RELATIVE PERCENT: 0.2 % (ref 0–1)
BILIRUB SERPL-MCNC: 0.4 MG/DL (ref 0–1)
BUN BLDV-MCNC: 7 MG/DL (ref 6–23)
CALCIUM SERPL-MCNC: 9.6 MG/DL (ref 8.3–10.6)
CHLORIDE BLD-SCNC: 102 MMOL/L (ref 99–110)
CO2: 21 MMOL/L (ref 21–32)
CREAT SERPL-MCNC: 0.7 MG/DL (ref 0.6–1.1)
DIFFERENTIAL TYPE: ABNORMAL
EOSINOPHILS ABSOLUTE: 0.1 K/CU MM
EOSINOPHILS RELATIVE PERCENT: 0.8 % (ref 0–3)
GFR AFRICAN AMERICAN: >60 ML/MIN/1.73M2
GFR NON-AFRICAN AMERICAN: >60 ML/MIN/1.73M2
GLUCOSE BLD-MCNC: 96 MG/DL (ref 70–99)
HCT VFR BLD CALC: 43.3 % (ref 37–47)
HEMOGLOBIN: 14.8 GM/DL (ref 12.5–16)
LYMPHOCYTES ABSOLUTE: 1.7 K/CU MM
LYMPHOCYTES RELATIVE PERCENT: 26.2 % (ref 24–44)
MCH RBC QN AUTO: 31 PG (ref 27–31)
MCHC RBC AUTO-ENTMCNC: 34.2 % (ref 32–36)
MCV RBC AUTO: 90.8 FL (ref 78–100)
MONOCYTES ABSOLUTE: 0.5 K/CU MM
MONOCYTES RELATIVE PERCENT: 7.3 % (ref 0–4)
PDW BLD-RTO: 14.5 % (ref 11.7–14.9)
PLATELET # BLD: 221 K/CU MM (ref 140–440)
PMV BLD AUTO: 10 FL (ref 7.5–11.1)
POTASSIUM SERPL-SCNC: 4 MMOL/L (ref 3.5–5.1)
RBC # BLD: 4.77 M/CU MM (ref 4.2–5.4)
SEGMENTED NEUTROPHILS ABSOLUTE COUNT: 4.1 K/CU MM
SEGMENTED NEUTROPHILS RELATIVE PERCENT: 65.5 % (ref 36–66)
SODIUM BLD-SCNC: 137 MMOL/L (ref 135–145)
TOTAL PROTEIN: 7.3 GM/DL (ref 6.4–8.2)
WBC # BLD: 6.3 K/CU MM (ref 4–10.5)

## 2020-12-18 PROCEDURE — 99211 OFF/OP EST MAY X REQ PHY/QHP: CPT

## 2020-12-18 PROCEDURE — 4004F PT TOBACCO SCREEN RCVD TLK: CPT | Performed by: INTERNAL MEDICINE

## 2020-12-18 PROCEDURE — G8419 CALC BMI OUT NRM PARAM NOF/U: HCPCS | Performed by: INTERNAL MEDICINE

## 2020-12-18 PROCEDURE — 36415 COLL VENOUS BLD VENIPUNCTURE: CPT

## 2020-12-18 PROCEDURE — G8484 FLU IMMUNIZE NO ADMIN: HCPCS | Performed by: INTERNAL MEDICINE

## 2020-12-18 PROCEDURE — 85025 COMPLETE CBC W/AUTO DIFF WBC: CPT

## 2020-12-18 PROCEDURE — 3017F COLORECTAL CA SCREEN DOC REV: CPT | Performed by: INTERNAL MEDICINE

## 2020-12-18 PROCEDURE — 80053 COMPREHEN METABOLIC PANEL: CPT

## 2020-12-18 PROCEDURE — G8427 DOCREV CUR MEDS BY ELIG CLIN: HCPCS | Performed by: INTERNAL MEDICINE

## 2020-12-18 PROCEDURE — 99213 OFFICE O/P EST LOW 20 MIN: CPT | Performed by: INTERNAL MEDICINE

## 2020-12-18 NOTE — PROGRESS NOTES
MA Rooming Questions  Patient: Luanne Sullivan  MRN: [id number]    Date: 12/18/2020        1. Do you have any new issues?   no         2. Do you need any refills on medications?    no    3. Have you had any imaging done since your last visit?   no    4. Have you been hospitalized or seen in the emergency room since your last visit here?   no    5. Did the patient have a depression screening completed today?  No    No data recorded     PHQ-9 Given to (if applicable):               PHQ-9 Score (if applicable):                     [] Positive     []  Negative              Does question #9 need addressed (if applicable)                     [] Yes    []  No               Chad Jung MA

## 2021-02-11 DIAGNOSIS — C34.11 MALIGNANT NEOPLASM OF UPPER LOBE OF RIGHT LUNG (HCC): Primary | ICD-10-CM

## 2021-03-08 ENCOUNTER — HOSPITAL ENCOUNTER (OUTPATIENT)
Dept: CT IMAGING | Age: 56
Discharge: HOME | End: 2021-03-08
Payer: MEDICARE

## 2021-03-08 DIAGNOSIS — C34.2 CANCER OF MIDDLE LOBE OF LUNG (HCC): ICD-10-CM

## 2021-03-08 DIAGNOSIS — C34.11 MALIGNANT NEOPLASM OF UPPER LOBE OF RIGHT LUNG (HCC): ICD-10-CM

## 2021-03-08 PROCEDURE — 71260 CT THORAX DX C+: CPT

## 2021-03-08 PROCEDURE — 6360000004 HC RX CONTRAST MEDICATION: Performed by: RADIOLOGY

## 2021-03-08 PROCEDURE — 2580000003 HC RX 258: Performed by: RADIOLOGY

## 2021-03-08 RX ORDER — SODIUM CHLORIDE 0.9 % (FLUSH) 0.9 %
10 SYRINGE (ML) INJECTION PRN
Status: DISCONTINUED | OUTPATIENT
Start: 2021-03-08 | End: 2021-03-09 | Stop reason: HOSPADM

## 2021-03-08 RX ADMIN — SODIUM CHLORIDE, PRESERVATIVE FREE 10 ML: 5 INJECTION INTRAVENOUS at 09:06

## 2021-03-08 RX ADMIN — IOPAMIDOL 75 ML: 755 INJECTION, SOLUTION INTRAVENOUS at 09:06

## 2021-03-16 NOTE — PROGRESS NOTES
Patient Name: Brenda Hart  Patient : 1965  Patient MRN: [id number]     Primary Oncologist: Magi Zimmerman MD  Referring Provider: Zully Castro MD     Date of Service: 3/25/2021      Chief Complaint:   Chief Complaint   Patient presents with    Follow-up     She came in for follow-up visit. Patient Active Problem List:     Syncope     Tobacco use disorder     Hyperlipidemia     Hemiparesis (HCC)     Malignant neoplasm of upper lobe bronchus, left (HCC)     Pulmonary nodule     HPI:   Sharath Gregorio is a pleasant 79-year-old female patient with history of lung surgery due to the bullae in , performed by Dr. Lauro Valera to prevent lung collapse, and high-grade adenocarcinoma of the lung, status post left upper lobe mass biopsy on 2014. Pathology report showed high-grade carcinoma with abundant necrosis. Immunostain study showed that the tumor cells were positive for CK7 and TTF1 and negative for CK5-6, Napsin A, and B63. The tumor was consistent with high-grade adenocarcinoma of the lung origin. EGFR and ALK rearrangement studies were negative. She went to the emergency room in 2013 with complaint of pain to the ribcage. CT chest without contrast on 2013 showed a 6.2 by 4.8 by 6.9 cm partially calcified large infiltrative mass involving the left upper lobe and multiple small mediastinal and left hilar nodes. Metastatic disease could not be excluded. Indeterminate hypodense lesions within the liver and involving the pancreas were described. Left adrenal gland was mildly hyperplastic. CT abdomen and pelvis on 2013 showed benign appearing left hepatic lobe mass, which was seen in 2007. This was probably an hemangioma. A smaller satellite nodule appeared benign and was probably a cyst.  No compelling evidence for solid organ metastases. There was no evidence of abnormal enhancement in the pancreatic head or neck.   Benign calcium 9.0, albumin 4.5, total protein 7.4, total bilirubin 0.4, alk phos 143, AST 23, ALT 18, magnesium 2.0. I planned to have a CT scan every six months in the first two or three years and yearly afterwards. CT chest in June 2014 showed no evidence of metastatic disease. She passed a kidney stone. Blood tests in December 2014 showed normal CBC. CMP was stable, with alk phos 150 and calcium 9.2. A CT of the chest in December 2014 showed no evidence of metastatic disease and no significant interval change compared to previous study. She had colonoscopy in July 2014 by Dr. Hay Watts and reportedly there were no polyps. Blood test in June 2015 showed normal CBC. CMP was stable with alk phos 165. CT chest in June 2015 showed no evidence of progression of the disease. The osseous and soft tissue structures appeared otherwise normal.   CT chest in December 2015 revealed no evidence of metastatic disease or progression of the cancer. Blood test in October 2015 revealed normal CBC and CMP. TSH was 0.88, ferritin 78, B12 281. Iron was 92. Ferritin was 78. CMP was within normal limits. Blood test in July 2016 revealed normal CBC and CMP. CT chest in June 2016 revealed no evidence of progression of the disease. CT chest in April 2018 revealed stable post surgical changes from the left pneumonectomy with stable loculated pleural effusion and no evidence of metastatic disease in the chest. 4 mm ground-glass nodule in the right upper lobe was noted. F/U CT chest in July 2018 revealed : previously visualized ground-glass nodule no longer visualized. No significant change in appearance of the chest otherwise. CXR in April 2019 showed no acute abnormality. She quit smoking in March 2019. CT chest in October 2019 showed :  1. Stable postsurgical changes from left pneumonectomy. 2. New 5 mm subsolid nodule in the right middle lobe adjacent to the minor fissure.  This may have been present on the prior exams but had a more subtle ground-glass appearance on the prior exams. Recommend a short-term follow-up in 3 months versus per clinical protocol. 3. Emphysematous changes. Maternal aunt had recurrent lung cancer. CT chest in January 2020 showed stable 6 mm irregular RML nodule, more solid concerning for malignancy. Too small for PET or biopsy. She went to [address] [place] [address].  CT chest on May 4, 2020 showed slight increased size of the spiculated subpleural right middle lobe nodule, now approximately 10 mm maximal diameter previously 6 mm. Other ill-defined groundglass nodules in the right upper lobe and right lower lobe appeared similar. Suspect simple appearing left renal and left hepatic cyst, nonobstructing right nephrolithiasis. I discussed with Dr. Anahi Doan, thoracic surgeon who is agreeable with PET CT scan. She smokes few cigarettes a day. She decided that she may not consider any chemotherapy, should the disease come back again  CBC was stable. CMP showed increased alkaline phosphatase. PET CT scan in June 2020 and CT chest in September 2020 were reviewed. Due to enlarging nodule I have referred to radiation oncologist for potential stereotactic radiotherapy. She underwent bronchoscopy with navigational bronchoscopy on October 22, 2020. She developed pneumothorax. Final Cytologic Diagnosis:   A.  Right Middle Lobe Lung Brushing cytology with cell block:        POSITIVE FOR MALIGNANCY.     B.  Right Middle Lobe Lavage cytology with cell block:        Nondiagnostic.     C.  Right Middle Lobe Lung Fine Needle Aspirate cytology with cell block:        POSITIVE FOR MALIGNANCY. She received stereotactic radiotherapy for 3 days and completed on November 30, 2020. On March 25, 2021 she came in for follow up visit. She was seen by Dr Jessie Simons who already discussed CT result. CT chest on March 8, 2021  1. Status post left pneumonectomy.  No thoracic adenopathy.    2. Right middle lobe 7 mm nodule abutting the minor fissure.  Additional 7 mm   subpleural lateral basal right lower lobe ground-glass density nodule.  Both   nodules have increased in size and density compared to 10/18/2019. Adenocarcinoma spectrum lesion should be considered. 3. Severe emphysema. I compared to PET scan in June 2020 and she is agreeable to have follow up CT chest in 6 months which is already scheduled by Dr Chetna Chowdary. She does not want to consider COVID vaccine. She has chronic arthritic pain. She has chronic pain at surgical site. Energy level is fine. She denied any weight loss or night sweats. .  She denied any night sweats, fever, or chills. No hemoptysis. No nausea, vomiting, dysuria, or hematuria. PAST MEDICAL HISTORY:  Lung disease, status post lung surgery in 2004, history of bilateral mastectomy due to the frequent lumpectomies in 2005 by Dr. Yamini Davila,  cholecystectomy in [facility], [facility] in 2012. Dyslipidemia. FAMILY HISTORY:  Father had lung cancer and he was a smoker. Mother had throat cancer and colon cancer in her seventies. Maternal grandfather had colon cancer in his eighties. One maternal aunt had lung cancer. She stated one cousin was diagnosed with pancreatic cancer. SOCIAL HISTORY:  She smoked about one pack per day for 35 years and is using electronic cigarettes. She drinks alcohol occasionally. She is  and has one child. She has been retired since 2014    LABORATORY STUDIES:   January 6, 2014: White cell count 9.2, hemoglobin 13.4, platelet 715, BUN 17, creatinine 0.6, AST 20, ALT 11, calcium 9.6, alk phos 113. Review of Systems: \"Per interval history; otherwise 10 point ROS is negative. \"     Vital Signs:  /79 (Site: Left Upper Arm, Position: Sitting, Cuff Size: Medium Adult)   Pulse 111   Temp 98.3 °F (36.8 °C) (Temporal)   Resp 18   Ht 5' (1.524 m)   Wt 93 lb 6.4 oz (42.4 kg)   LMP 01/12/2010   SpO2 96%   BMI 18.24 kg/m²     Physical mastectomy, and colonoscopy in 2014.    3.  We discussed about healthy diet and lifestyle. 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 We again discussed about smoking cessation. She refused covid vaccine. RTC in September 2021 or sooner. All of their questions have been answered for today. Recent imaging and labs were reviewed and discussed with the patient.       Electronically signed by Mitali Gilmore MD on 9/8/20 at 8:55 AM EDT

## 2021-03-19 ENCOUNTER — HOSPITAL ENCOUNTER (OUTPATIENT)
Dept: RADIATION ONCOLOGY | Age: 56
Discharge: HOME | End: 2021-03-19
Attending: RADIOLOGY
Payer: MEDICARE

## 2021-03-19 VITALS
HEART RATE: 102 BPM | SYSTOLIC BLOOD PRESSURE: 124 MMHG | RESPIRATION RATE: 16 BRPM | HEIGHT: 60 IN | OXYGEN SATURATION: 97 % | DIASTOLIC BLOOD PRESSURE: 87 MMHG | WEIGHT: 93 LBS | TEMPERATURE: 98.8 F | BODY MASS INDEX: 18.26 KG/M2

## 2021-03-19 DIAGNOSIS — C34.2 MALIGNANT NEOPLASM OF MIDDLE LOBE, BRONCHUS OR LUNG (HCC): Primary | ICD-10-CM

## 2021-03-19 PROCEDURE — 99211 OFF/OP EST MAY X REQ PHY/QHP: CPT

## 2021-03-19 PROCEDURE — 99213 OFFICE O/P EST LOW 20 MIN: CPT | Performed by: RADIOLOGY

## 2021-03-19 ASSESSMENT — PAIN DESCRIPTION - DESCRIPTORS: DESCRIPTORS: ACHING;SHARP

## 2021-03-19 ASSESSMENT — PAIN DESCRIPTION - ONSET: ONSET: ON-GOING

## 2021-03-19 ASSESSMENT — PAIN DESCRIPTION - FREQUENCY: FREQUENCY: INTERMITTENT

## 2021-03-19 ASSESSMENT — PAIN DESCRIPTION - PAIN TYPE: TYPE: ACUTE PAIN

## 2021-03-19 ASSESSMENT — PAIN DESCRIPTION - PROGRESSION: CLINICAL_PROGRESSION: NOT CHANGED

## 2021-03-19 ASSESSMENT — PAIN DESCRIPTION - ORIENTATION: ORIENTATION: LEFT

## 2021-03-19 ASSESSMENT — PAIN SCALES - GENERAL: PAINLEVEL_OUTOF10: 3

## 2021-03-19 ASSESSMENT — PAIN - FUNCTIONAL ASSESSMENT: PAIN_FUNCTIONAL_ASSESSMENT: ACTIVITIES ARE NOT PREVENTED

## 2021-03-19 NOTE — PROGRESS NOTES
bone pains, abd pain, changes in bowels/bladder      Past Medical History:   Diagnosis Date    Acid reflux     Anemia     Asthma     Cerebral artery occlusion with cerebral infarction ([facility] [address]     COPD (chronic obstructive pulmonary disease) (HCC)     \"use to see Dr Alpesh Lomeli but do not care for him\"    Full dentures     Gestational diabetes     \"had gestational diabetess 1984\"\"no trouble with sugar since then\"    History of external beam radiation therapy 12/2020    RML SBRT- 5,400 cGy [facility]    History of kidney stones     \"passed a kidney stone approx 2016\"\"they said I had 3 stones and know for sure passed one\"    Hyperlipidemia     Lung cancer ([facility] [address]     \"had left lung cancer - removed the lung- 2013= tx with chemo treatments- follow with Dr Cleta Spurling"    Lung nodule     \"one spot on right lung- having bronchoscopy\"( 10/16/2020)    Pneumonia 2018    emphysema, COPD    Pneumothorax     TIA (transient ischemic attack)     \"had TIA in 2012- passed out had weakness right side,affected my memory- took approx 6 months for everything to come back\"        Past Surgical History:   Procedure Laterality Date    BREAST SURGERY  2012    double mastectomy (fibroid) after several lumpectomies    BRONCHOSCOPY N/A 10/20/2020    BRONCHOSCOPY ISAAC performed by Milton Mosqueda [facility]  07/2014    CT GUIDED CHEST TUBE  10/20/2020    CT GUIDED CHEST TUBE 10/20/2020 [facility] CT SC[facility]    HERNIA REPAIR  2010    ventral hernia repair    LUNG BIOPSY Left 01/02/2014    Left upper lobe mass    LUNG SURGERY Left 02/27/2014    Left peunomectomy with lymph node sampling    LUNG SURGERY  2001    MEDIASTINOSCOPY  02/14/2014    bx lymph nodes    TONSILLECTOMY  as a kid       Family History   Problem Relation Age of Onset    Cancer Father         lung cancer    Heart Disease Father     Substance Abuse Father     Heart Attack Father     Depression Paternal Wilma Fink Arthritis Maternal Grandfather     Diabetes Maternal Grandfather     High Blood Pressure Maternal Grandfather     Vision Loss Maternal Grandfather     Cancer Paternal Grandfather     High Cholesterol Mother     Cancer Mother         throat cancer    Heart Disease Mother     Miscarriages / [place] Sister        Social History     Socioeconomic History    Marital status:      Spouse name: Not on file    Number of children: Not on file    Years of education: Not on file    Highest education level: Not on file   Occupational History    Not on file   Social Needs    Financial resource strain: Not on file    Food insecurity     Worry: Not on file     Inability: Not on file   [employer] needs     Medical: Not on file     Non-medical: Not on file   Tobacco Use    Smoking status: Current Every Day Smoker     Packs/day: 0.25     Years: 43.00     Pack years: 10.75     Types: Cigarettes     Start date: 9/9/1977    Smokeless tobacco: Never Used    Tobacco comment: \"the most every smoke was a pack per day \"   Substance and Sexual Activity    Alcohol use: Yes     Comment: 2 drinks a month    Drug use: Yes     Types: Marijuana     Comment: daily- does not have medical marijuana card\"age 20's use to do coke- last did age 17\"    Sexual activity: Yes     Partners: Male   Lifestyle    Physical activity     Days per week: Not on file     Minutes per session: Not on file    Stress: Not on file   Relationships    Social connections     Talks on phone: Not on file     Gets together: Not on file     Attends [employer] service: Not on file     Active member of club or organization: Not on file     Attends meetings of clubs or organizations: Not on file     Relationship status: Not on file    Intimate partner violence     Fear of current or ex partner: Not on file     Emotionally abused: Not on file     Physically abused: Not on file     Forced sexual activity: Not on file   Other Topics Concern    Not on file   Social History Narrative    Not on file         MEDICATIONS:     Current Outpatient Medications:     Naproxen Sodium (ALEVE) 220 MG CAPS, Take 1 capsule by mouth every 6 hours as needed for Pain, Disp: , Rfl:     ipratropium (ATROVENT) 0.02 % nebulizer solution, Take 2.5 mLs by nebulization 4 times daily, Disp: 2.5 mL, Rfl: 5    carvedilol (COREG) 3.125 MG tablet, Take 1 tablet by mouth 2 times daily (with meals) (Patient not taking: Reported on 3/19/2021), Disp: 60 tablet, Rfl: 3      REVIEW OF SYSTEMS:  Pertinent positive and negatives in History: the remainder of the 14-pt ROS is negative. EXAMINATION:   Vitals:    03/19/21 1117   BP: 124/87   Pulse: 102   Resp: 16   Temp: 98.8 °F (37.1 °C)   SpO2: 97%     A&O x3, NAD  Sclera anicteric, EOMI  No cervical/SCV LAD  Respirations unlabored  No UE/LE edema  No spinal or other bony tenderness to firm palpation  Nl mood/affect/judgement      LABORATORY STUDIES:   CBC:   Lab Results   Component Value Date    WBC 6.3 12/18/2020    RBC 4.77 12/18/2020    HGB 14.8 12/18/2020    HCT 43.3 12/18/2020    MCV 90.8 12/18/2020    MCH 31.0 12/18/2020    MCHC 34.2 12/18/2020    RDW 14.5 12/18/2020     12/18/2020    MPV 10.0 12/18/2020     CMP:  Lab Results   Component Value Date     12/18/2020    K 4.0 12/18/2020     12/18/2020    CO2 21 12/18/2020    BUN 7 12/18/2020    CREATININE 0.7 12/18/2020    GFRAA >60 12/18/2020    LABGLOM >60 12/18/2020    GLUCOSE 96 12/18/2020    PROT 7.3 12/18/2020    LABALBU 4.5 12/18/2020    CALCIUM 9.6 12/18/2020    BILITOT 0.4 12/18/2020    ALKPHOS 130 12/18/2020    AST 22 12/18/2020    ALT 21 12/18/2020         RADIOLOGIC STUDIES:    Ct Chest W Contrast    Result Date: 3/8/2021  EXAMINATION: CT OF THE CHEST WITH CONTRAST 3/8/2021 8:53 am TECHNIQUE: CT of the chest was performed with the administration of intravenous contrast. Multiplanar reformatted images are provided for review.  Dose modulation, iterative reconstruction, and/or weight based adjustment of the mA/kV was utilized to reduce the radiation dose to as low as reasonably achievable. COMPARISON: 10/20/2020 HISTORY: ORDERING SYSTEM PROVIDED HISTORY: Malignant neoplasm of upper lobe of right lung [facility]) TECHNOLOGIST PROVIDED HISTORY: Reason for Exam: Recheck Lung Cancer, Surg- Lt lung removed, Inj 75cc Isovue 370 followed by saline flush Subsequent follow-up FINDINGS: Mediastinum: Leftward mediastinal shift. No central or lobar pulmonary embolus in the right lung. Thoracic aorta is normal caliber. Heart size is normal.  No pericardial effusion. No mediastinal or hilar adenopathy. Lungs/pleura: Status post left pneumonectomy with elevated left hemidiaphragm. Small amount of low-attenuation fluid in the left hemithorax, stable from the prior exam. Severe emphysema in the right lung. In the right middle lobe along the right minor fissure, there is a 7 mm pulmonary nodule, previously measuring 8 mm on 09/02/2020. Compared to 10/18/2019, the nodule is increased in size and density, originally ground-glass in 2019. Extension along the minor fissure pleural surface has progressed. These changes are best characterized on the coronal reconstructions. In the lateral basal right lower lobe, there are a couple ground-glass density pulmonary nodules. 1 of the nodules measuring 7 mm has progressed from 10/18/2019. The adjacent subpleural 5 mm ground-glass density nodule is stable from 10/18/2019. In the posterior segment right upper lobe adjacent to the major fissure, there is a stable 5 mm ground-glass density nodule. No pleural effusion or pneumothorax. The central airways are clear. Upper Abdomen: Stable elevation of the left hemidiaphragm. Stable thickening of the left adrenal gland. Stable 15 mm low-attenuation lesion in the left hepatic lobe. Soft Tissues/Bones: No lytic or blastic osseous lesion. 1. Status post left pneumonectomy.   No thoracic adenopathy. 2. Right middle lobe 7 mm nodule abutting the minor fissure. Additional 7 mm subpleural lateral basal right lower lobe ground-glass density nodule. Both nodules have increased in size and density compared to 10/18/2019. Adenocarcinoma spectrum lesion should be considered. 3. Severe emphysema. I personally reviewed relevant radiology images. MEDICAL DECISION MAKING:     No clinical or radiographic evidence of recurrence  Treated lesion in RML is stable to slightly decreased in size  The other 2 spots are larger per report from 2019 but on personal review appear similar to most recent scans in September 2020  Recommend continue monitoring at 6 month interval with Chest CT        MEDICAL DECISION MAKING    Number/Complexity of Problems Addressed  [] 1 self-limited of minor problem (98543/69309)  [x] >=2 self-limited or minor problems, 1 stable chronic illness, 1 acute/uncomplicated illness/injury (73975/21321)  []Chronic illnesses with exacerbation/progression/side effects of treatment, >=2 stable chronic illnesses, 1 undiagnosed new problem with uncertain prognosis, 1 acute illness with systemic symptoms, 1 acute complicated injury (07384/36285)  []Chronic illnesses with severe exacerbation/progression/treatment side effects, acute or chronic illness or injury that poses a threat to life or bodily function (70337/03747)    Amount and/or Complexity of Data Reviewed  [] Minimal or none (84161/25113)  [x] Limited - meets 1/2 categories (72388/69006)  1. At least 2 of: review of prior external notes from each unique source, review results of each unique test, ordering of each unique test  2. Assessment requiring an independent historian  [] Moderate - meets 1/3 categories (52144/67202)  1. Any 3 of: Review of prior external notes from each unique source, review results of each unique test, ordering of each unique test, assessment requiring an independent historian  2.  Independent interpretation of

## 2021-03-19 NOTE — PROGRESS NOTES
[facility]  3/19/2021    Patient is seen today for follow up. Vitals:    03/19/21 1117   BP: 124/87   Pulse: 102   Resp: 16   Temp: 98.8 °F (37.1 °C)   SpO2: 97%        Oxygen Therapy  SpO2: 97 %  Pulse Oximeter Device Mode: Intermittent  Pulse Oximeter Device Location: Finger, Right  O2 Device: None (Room air)  Skin Assessment: Clean, dry, & intact    Wt Readings from Last 3 Encounters:   03/19/21 93 lb (42.2 kg)   12/18/20 94 lb 9.6 oz (42.9 kg)   10/30/20 90 lb (40.8 kg)       Pain Assessment  Pain Assessment: 0-10  Pain Level: 3  Patient's Stated Pain Goal: No pain  Pain Type: Acute pain  Pain Location: Rib cage  Pain Orientation: Left  Pain Descriptors: Aching, Sharp  Pain Frequency: Intermittent  Pain Onset: On-going  Clinical Progression: Not changed  Functional Pain Assessment: Activities are not prevented  Non-Pharmaceutical Pain Intervention(s): Repositioned, Rest  Response to Pain Intervention: Other (Comment)(sporadic)  Multiple Pain Sites: No  OTC Analgesics- mariajuana daily       Allergies   Allergen Reactions    Erythromycin Other (See Comments)     Was rushed to hospital, heart palpitations      Macrolides And Ketolides     Tape [Adhesive Tape]      blisters    Codeine Nausea And Vomiting    Vicodin [Hydrocodone-Acetaminophen] Nausea And Vomiting        Current Outpatient Medications   Medication Sig Dispense Refill    Naproxen Sodium (ALEVE) 220 MG CAPS Take 1 capsule by mouth every 6 hours as needed for Pain      ipratropium (ATROVENT) 0.02 % nebulizer solution Take 2.5 mLs by nebulization 4 times daily 2.5 mL 5    carvedilol (COREG) 3.125 MG tablet Take 1 tablet by mouth 2 times daily (with meals) (Patient not taking: Reported on 3/19/2021) 60 tablet 3     No current facility-administered medications for this encounter. Additional Comments: Pt here to review CT results. No new complaints or concerns voiced. Reporting pain left rib area, rates \"3\" at this time.  Productive cough of clear phlegm, shortness of breath with minimal exertion, improves with rest.     Electronically signed by Sean Garcia RN on 3/19/2021 at 11:20 AM

## 2021-03-24 ENCOUNTER — APPOINTMENT (OUTPATIENT)
Dept: RADIATION ONCOLOGY | Age: 56
End: 2021-03-24
Attending: RADIOLOGY
Payer: MEDICARE

## 2021-03-25 ENCOUNTER — OFFICE VISIT (OUTPATIENT)
Dept: ONCOLOGY | Age: 56
End: 2021-03-25
Payer: MEDICARE

## 2021-03-25 ENCOUNTER — HOSPITAL ENCOUNTER (OUTPATIENT)
Dept: INFUSION THERAPY | Age: 56
Discharge: HOME | End: 2021-03-25
Payer: MEDICARE

## 2021-03-25 VITALS
BODY MASS INDEX: 18.34 KG/M2 | WEIGHT: 93.4 LBS | RESPIRATION RATE: 18 BRPM | TEMPERATURE: 98.3 F | OXYGEN SATURATION: 96 % | SYSTOLIC BLOOD PRESSURE: 113 MMHG | HEIGHT: 60 IN | HEART RATE: 111 BPM | DIASTOLIC BLOOD PRESSURE: 79 MMHG

## 2021-03-25 DIAGNOSIS — C34.2 MALIGNANT NEOPLASM OF MIDDLE LOBE, BRONCHUS OR LUNG (HCC): Primary | ICD-10-CM

## 2021-03-25 PROCEDURE — G8484 FLU IMMUNIZE NO ADMIN: HCPCS | Performed by: INTERNAL MEDICINE

## 2021-03-25 PROCEDURE — 4004F PT TOBACCO SCREEN RCVD TLK: CPT | Performed by: INTERNAL MEDICINE

## 2021-03-25 PROCEDURE — 99213 OFFICE O/P EST LOW 20 MIN: CPT | Performed by: INTERNAL MEDICINE

## 2021-03-25 PROCEDURE — G8427 DOCREV CUR MEDS BY ELIG CLIN: HCPCS | Performed by: INTERNAL MEDICINE

## 2021-03-25 PROCEDURE — 99211 OFF/OP EST MAY X REQ PHY/QHP: CPT

## 2021-03-25 PROCEDURE — 3017F COLORECTAL CA SCREEN DOC REV: CPT | Performed by: INTERNAL MEDICINE

## 2021-03-25 PROCEDURE — G8419 CALC BMI OUT NRM PARAM NOF/U: HCPCS | Performed by: INTERNAL MEDICINE

## 2021-03-25 NOTE — PROGRESS NOTES
MA Rooming Questions  Patient: Vijay Bradley  MRN: [id number]    Date: 3/25/2021        1. Do you have any new issues?   no         2. Do you need any refills on medications?    no    3. Have you had any imaging done since your last visit? yes - CT    4. Have you been hospitalized or seen in the emergency room since your last visit here?   no    5. Did the patient have a depression screening completed today?  No    No data recorded     PHQ-9 Given to (if applicable):               PHQ-9 Score (if applicable):                     [] Positive     []  Negative              Does question #9 need addressed (if applicable)                     [] Yes    []  No               Alek Huynh MA

## 2021-08-24 NOTE — PROGRESS NOTES
upper pole of the left kidney and bilateral nephrolithiasis were described. I discussed with her,  therefore, biopsy of the liver was not done. Blood test in December 2013 showed sodium 135, potassium 3.8, bun 17, creatinine 0.7, white cell 5.4, hemoglobin 14.2, platelet 115. CMP was December 24, 2013, showed normal sodium at 140, potassium 4.0, BUN 20, creatinine 0.7, calcium 9.8, alk phos 136, ALT 16, AST 21, albumin 4.1, total protein 7.6, total bilirubin 0.4. Pulmonary function test was okay. She was seen by Dr. Donice Hodgkin at [facility], who  ordered pretest study on February 7, 2014, including MRI of the brain, echo, and stress test.    PET-CT scan on January 13, 2014 showed:  1. Large left lung neoplasm., 2. Probable left hilar and mediastinal puja metastases. , 3. Asymmetric hypermetabolic activity involving the cecum. CT brain on January 8, 2014 was a normal study. Mediastinoscopy on February 14, 2014 showed: reportedly all nodes sampled were negative for the cancer. She had left lung pneumonectomy with lymph node sampling on February 27, 2014. Pathology report showed solid predominant adenocarcinoma measuring 7.9 by 6.2 by 5.1 cm in the left upper lobe, grade 3. No visceral invasion present. The tumor was confined to the lung parenchyma. The bronchial margin was negative. There was extensive lymphatic/vascular invasion and one in two lymph nodes was negative for malignancy and pathologically she was staged as T3, N0, MX.  ALK study was negative for rearrangements. She was recommended to consider adjuvant chemotherapy. I went over the NCCN Guideline and I put her on cisplatin/Alimta regimen. She received first adjuvant treatment with Alimta/cisplatin on March 19, 2014, and completed the fourth cycle of adjuvant cisplatinum/Alimta on May 21, 2014.     Blood test in May 2014 showed white cell count of 3.7, hemoglobin 13.3, platelet 997, BUN 16, creatinine 0.6, calcium 9.0, albumin 4.5, total protein 7.4, total bilirubin 0.4, alk phos 143, AST 23, ALT 18, magnesium 2.0. I planned to have a CT scan every six months in the first two or three years and yearly afterwards. CT chest in June 2014 showed no evidence of metastatic disease. She passed a kidney stone. Blood tests in December 2014 showed normal CBC. CMP was stable, with alk phos 150 and calcium 9.2. A CT of the chest in December 2014 showed no evidence of metastatic disease and no significant interval change compared to previous study. She had colonoscopy in July 2014 by Dr. Shelby Ding and reportedly there were no polyps. Blood test in June 2015 showed normal CBC. CMP was stable with alk phos 165. CT chest in June 2015 showed no evidence of progression of the disease. The osseous and soft tissue structures appeared otherwise normal.   CT chest in December 2015 revealed no evidence of metastatic disease or progression of the cancer. Blood test in October 2015 revealed normal CBC and CMP. TSH was 0.88, ferritin 78, B12 281. Iron was 92. Ferritin was 78. CMP was within normal limits. Blood test in July 2016 revealed normal CBC and CMP. CT chest in June 2016 revealed no evidence of progression of the disease. CT chest in April 2018 revealed stable post surgical changes from the left pneumonectomy with stable loculated pleural effusion and no evidence of metastatic disease in the chest. 4 mm ground-glass nodule in the right upper lobe was noted. F/U CT chest in July 2018 revealed : previously visualized ground-glass nodule no longer visualized. No significant change in appearance of the chest otherwise. CXR in April 2019 showed no acute abnormality. She quit smoking in March 2019. CT chest in October 2019 showed :  1. Stable postsurgical changes from left pneumonectomy. 2. New 5 mm subsolid nodule in the right middle lobe adjacent to the minor fissure.  This may have been present on the prior exams but had a more subtle ground-glass appearance on the prior exams. Recommend a short-term follow-up in 3 months versus per clinical protocol. 3. Emphysematous changes. Maternal aunt had recurrent lung cancer. CT chest in January 2020 showed stable 6 mm irregular RML nodule, more solid concerning for malignancy. Too small for PET or biopsy. She went to [address] [place] [address].  CT chest on May 4, 2020 showed slight increased size of the spiculated subpleural right middle lobe nodule, now approximately 10 mm maximal diameter previously 6 mm. Other ill-defined groundglass nodules in the right upper lobe and right lower lobe appeared similar. Suspect simple appearing left renal and left hepatic cyst, nonobstructing right nephrolithiasis. I discussed with Dr. Ailin Harper, thoracic surgeon who is agreeable with PET CT scan. She smokes few cigarettes a day. She decided that she may not consider any chemotherapy, should the disease come back again  CBC was stable. CMP showed increased alkaline phosphatase. PET CT scan in June 2020 and CT chest in September 2020 were reviewed. Due to enlarging nodule I have referred to radiation oncologist for potential stereotactic radiotherapy. She underwent bronchoscopy with navigational bronchoscopy on October 22, 2020. She developed pneumothorax. Final Cytologic Diagnosis:   A.  Right Middle Lobe Lung Brushing cytology with cell block:        POSITIVE FOR MALIGNANCY.     B.  Right Middle Lobe Lavage cytology with cell block:        Nondiagnostic.     C.  Right Middle Lobe Lung Fine Needle Aspirate cytology with cell block:        POSITIVE FOR MALIGNANCY. She received stereotactic radiotherapy for 3 days and completed on November 30, 2020. She was seen by Dr Isamar Quintana who already discussed CT result. CT chest on March 8, 2021  1. Status post left pneumonectomy.  No thoracic adenopathy.   2. Right middle lobe 7 mm nodule abutting the minor fissure.  Additional 7 mm subpleural lateral basal right lower lobe ground-glass density nodule.  Both nodules have increased in size and density compared to 10/18/2019. Adenocarcinoma spectrum lesion should be considered. 3. Severe emphysema. I compared to PET scan in June 2020 and she was agreeable to have follow up CT chest in 6 months which is already scheduled by Dr Maxime Elias. On September 23, 2021 she came in for follow-up visit. Chest ct on September 21, 2021 showed  Increased size of solid component of a mixed density nodule in the right lower lobe, 5 mm, previously 2 mm, much increased over the past 6 months. Close follow-up is recommended, chest CT in 3 months recommended.    Stable tiny nodules elsewhere measuring less than 1 cm.  No new findings elsewhere in the chest  She saw radiation oncologist today who ordered follow-up CT chest in 3 months. She has been using CBD gummy she ordered online. It seems to help her anxiety and pain. Again we discussed about smoking cessation. We will check labs today. I advised to call for the test result. She has chronic arthritic pain. She has chronic pain at surgical site. Energy level is fine. She denied any weight loss or night sweats. .  She denied any night sweats, fever, or chills. No hemoptysis. No nausea, vomiting, dysuria, or hematuria. PAST MEDICAL HISTORY:  Lung disease, status post lung surgery in 2004, history of bilateral mastectomy due to the frequent lumpectomies in 2005 by Dr. Leela Chamorro,  cholecystectomy in [facility], [facility] in 2012. Dyslipidemia. FAMILY HISTORY:  Father had lung cancer and he was a smoker. Mother had throat cancer and colon cancer in her seventies. Maternal grandfather had colon cancer in his eighties. One maternal aunt had lung cancer. She stated one cousin was diagnosed with pancreatic cancer. SOCIAL HISTORY:  She smoked about one pack per day for 35 years and is using electronic cigarettes. She drinks alcohol occasionally. She is  and has one child.   She has been retired since 2014    LABORATORY STUDIES:   January 6, 2014: White cell count 9.2, hemoglobin 13.4, platelet 004, BUN 17, creatinine 0.6, AST 20, ALT 11, calcium 9.6, alk phos 113. Review of Systems: \"Per interval history; otherwise 10 point ROS is negative. \"     Vital Signs:  [facility]/12/2010     Physical Exam:  CONSTITUTIONAL: awake, alert, cooperative, no apparent distress   EYES: pupils equal, round and reactive to light, sclera clear and conjunctiva normal  ENT: Normocephalic, without obvious abnormality, atraumatic  NECK: supple, symmetrical, no jugular venous distension and no carotid bruits   HEMATOLOGIC/LYMPHATIC: no cervical, supraclavicular or axillary lymphadenopathy   LUNGS: no increased work of breathing and clear to auscultation  BREAST: Status post bilateral mastectomy. CARDIOVASCULAR: regular rate and rhythm, normal S1 and S2, no murmur  ABDOMEN: normal bowel sound, soft, non-distended, non-tender, no masses palpated, no hepatosplenomegaly   MUSCULOSKELETAL: full range of motion noted, tone is normal  NEUROLOGIC: awake, alert, oriented to name, place and time. Cranial nerves II through XII grossly intact. CN II -XII grossly intact. SKIN: Normal skin color, texture, turgor and no jaundice. appears intact   EXTREMITIES: no LE edema. No cyanosis.      Labs:  Hematology:  Lab Results   Component Value Date    WBC 6.3 12/18/2020    RBC 4.77 12/18/2020    HGB 14.8 12/18/2020    HCT 43.3 12/18/2020    MCV 90.8 12/18/2020    MCH 31.0 12/18/2020    MCHC 34.2 12/18/2020    RDW 14.5 12/18/2020     12/18/2020    MPV 10.0 12/18/2020    SEGSPCT 65.5 12/18/2020    EOSRELPCT 0.8 12/18/2020    BASOPCT 0.2 12/18/2020    LYMPHOPCT 26.2 12/18/2020    MONOPCT 7.3 (H) 12/18/2020    SEGSABS 4.1 12/18/2020    EOSABS 0.1 12/18/2020    BASOSABS 0.0 12/18/2020    LYMPHSABS 1.7 12/18/2020    MONOSABS 0.5 12/18/2020    DIFFTYPE AUTOMATED DIFFERENTIAL 12/18/2020     Lab Results   Component Value Date ESR 20 05/16/2012     Chemistry:  Lab Results   Component Value Date     12/18/2020    K 4.0 12/18/2020     12/18/2020    CO2 21 12/18/2020    BUN 7 12/18/2020    CREATININE 0.7 12/18/2020    GLUCOSE 96 12/18/2020    CALCIUM 9.6 12/18/2020    PROT 7.3 12/18/2020    LABALBU 4.5 12/18/2020    BILITOT 0.4 12/18/2020    ALKPHOS 130 (H) 12/18/2020    AST 22 12/18/2020    ALT 21 12/18/2020    LABGLOM >60 12/18/2020    GFRAA >60 12/18/2020    MG 2.1 10/21/2020     Lab Results   Component Value Date    TSHHS 0.806 05/16/2012     Immunology:  Lab Results   Component Value Date    PROT 7.3 12/18/2020     Coagulation Panel:  Lab Results   Component Value Date    PROTIME 10.8 (L) 10/12/2020    INR 0.89 10/12/2020    APTT 28.7 10/12/2020      Observations:  No data recorded        Assessment & Plan:    1. She had stage II adenocarcinoma of the lung. EGFR and ALK study was negative. She had surgery of the lung and was placed on adjuvant chemotherapy, cisplatin and Alimta. She completed adjuvant chemotherapy on May 21, 2014. CBC in June 2014 was within normal limits. CMP was stable for her with alk phos 157. CT chest in June 2016 was negative for recurrent disease. CBC and CMP in July 2016 were within normal limits. CT chest in April 2017 revealed no evidence of progression of the disease. CT chest in April 2018 revealed stable findings with 4 mm ground-glass nodule to the right upper lung. Follow-up CT chest in July 2018 was reviewed. CXR in April 2019 was non acute. CT chest in October 2019 showed stable findings with new 5 mm sub-solid nodule in the right middle lobe. CT chest in January 2020 was reviewed. CT chest in May 2020 showed enlarging right middle lung nodule. PET scan in June 2020 and CT chest in September 2020 were reviewed. Since the nodule continues to get larger. I referred to see radiation oncologist for potential stereotactic radiotherapy.  She has bronchoscopy in October 2021 and pathology report was positive for NSCLC. She had stereotactic radiotherapy to RML until November 30, 2020. CT  Chest in September 2021 was reviewed. She is agreeable to have CT chest in 3 months. 2.  I advised her to keep her other age appropriate cancer screening up-to-date. She had bilateral mastectomy, and colonoscopy in 2014.    3.  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 We again discussed about smoking cessation. Check labs today. She has been taking CBD gummy which she ordered online. We discussed about healthy diet and lifestyle. She had covid vaccine. RTC in 3 months or sooner. All of their questions have been answered for today. Recent imaging and labs were reviewed and discussed with the patient.

## 2021-09-21 ENCOUNTER — HOSPITAL ENCOUNTER (OUTPATIENT)
Dept: CT IMAGING | Age: 56
Discharge: HOME | End: 2021-09-21
Payer: MEDICARE

## 2021-09-21 DIAGNOSIS — C34.2 MALIGNANT NEOPLASM OF MIDDLE LOBE, BRONCHUS OR LUNG (HCC): ICD-10-CM

## 2021-09-21 PROCEDURE — 2580000003 HC RX 258: Performed by: RADIOLOGY

## 2021-09-21 PROCEDURE — 71260 CT THORAX DX C+: CPT

## 2021-09-21 PROCEDURE — 6360000004 HC RX CONTRAST MEDICATION: Performed by: RADIOLOGY

## 2021-09-21 RX ORDER — SODIUM CHLORIDE 0.9 % (FLUSH) 0.9 %
10 SYRINGE (ML) INJECTION PRN
Status: DISCONTINUED | OUTPATIENT
Start: 2021-09-21 | End: 2021-09-22 | Stop reason: HOSPADM

## 2021-09-21 RX ADMIN — SODIUM CHLORIDE, PRESERVATIVE FREE 10 ML: 5 INJECTION INTRAVENOUS at 10:58

## 2021-09-21 RX ADMIN — IOPAMIDOL 75 ML: 755 INJECTION, SOLUTION INTRAVENOUS at 11:01

## 2021-09-23 ENCOUNTER — HOSPITAL ENCOUNTER (OUTPATIENT)
Dept: RADIATION ONCOLOGY | Age: 56
Discharge: HOME | End: 2021-09-23
Attending: RADIOLOGY
Payer: MEDICARE

## 2021-09-23 ENCOUNTER — OFFICE VISIT (OUTPATIENT)
Dept: ONCOLOGY | Age: 56
End: 2021-09-23
Payer: MEDICARE

## 2021-09-23 ENCOUNTER — HOSPITAL ENCOUNTER (OUTPATIENT)
Dept: INFUSION THERAPY | Age: 56
Discharge: HOME | End: 2021-09-23
Payer: MEDICARE

## 2021-09-23 VITALS
TEMPERATURE: 96 F | WEIGHT: 91 LBS | SYSTOLIC BLOOD PRESSURE: 151 MMHG | HEIGHT: 60 IN | BODY MASS INDEX: 17.87 KG/M2 | OXYGEN SATURATION: 98 % | RESPIRATION RATE: 16 BRPM | HEART RATE: 88 BPM | DIASTOLIC BLOOD PRESSURE: 83 MMHG

## 2021-09-23 DIAGNOSIS — D75.1 ERYTHROCYTOSIS: ICD-10-CM

## 2021-09-23 DIAGNOSIS — C34.2 MALIGNANT NEOPLASM OF MIDDLE LOBE, BRONCHUS OR LUNG (HCC): ICD-10-CM

## 2021-09-23 DIAGNOSIS — C34.2 MALIGNANT NEOPLASM OF MIDDLE LOBE OF LUNG (HCC): Primary | ICD-10-CM

## 2021-09-23 DIAGNOSIS — C34.2 MALIGNANT NEOPLASM OF MIDDLE LOBE, BRONCHUS OR LUNG (HCC): Primary | ICD-10-CM

## 2021-09-23 LAB
ALBUMIN SERPL-MCNC: 4.8 GM/DL (ref 3.4–5)
ALP BLD-CCNC: 164 IU/L (ref 40–128)
ALT SERPL-CCNC: 15 U/L (ref 10–40)
ANION GAP SERPL CALCULATED.3IONS-SCNC: 15 MMOL/L (ref 4–16)
AST SERPL-CCNC: 16 IU/L (ref 15–37)
BASOPHILS ABSOLUTE: 0 K/CU MM
BASOPHILS RELATIVE PERCENT: 0.2 % (ref 0–1)
BILIRUB SERPL-MCNC: 0.3 MG/DL (ref 0–1)
BUN BLDV-MCNC: 15 MG/DL (ref 6–23)
CALCIUM SERPL-MCNC: 10 MG/DL (ref 8.3–10.6)
CHLORIDE BLD-SCNC: 104 MMOL/L (ref 99–110)
CO2: 23 MMOL/L (ref 21–32)
CREAT SERPL-MCNC: 0.7 MG/DL (ref 0.6–1.1)
DIFFERENTIAL TYPE: ABNORMAL
EOSINOPHILS ABSOLUTE: 0.1 K/CU MM
EOSINOPHILS RELATIVE PERCENT: 1.3 % (ref 0–3)
GFR AFRICAN AMERICAN: >60 ML/MIN/1.73M2
GFR NON-AFRICAN AMERICAN: >60 ML/MIN/1.73M2
GLUCOSE BLD-MCNC: 112 MG/DL (ref 70–99)
HCT VFR BLD CALC: 46 % (ref 37–47)
HEMOGLOBIN: 15.6 GM/DL (ref 12.5–16)
LYMPHOCYTES ABSOLUTE: 1.5 K/CU MM
LYMPHOCYTES RELATIVE PERCENT: 26.5 % (ref 24–44)
MCH RBC QN AUTO: 30.5 PG (ref 27–31)
MCHC RBC AUTO-ENTMCNC: 33.9 % (ref 32–36)
MCV RBC AUTO: 90 FL (ref 78–100)
MONOCYTES ABSOLUTE: 0.3 K/CU MM
MONOCYTES RELATIVE PERCENT: 5.9 % (ref 0–4)
PDW BLD-RTO: 14.9 % (ref 11.7–14.9)
PLATELET # BLD: 243 K/CU MM (ref 140–440)
PMV BLD AUTO: 9.8 FL (ref 7.5–11.1)
POTASSIUM SERPL-SCNC: 4.4 MMOL/L (ref 3.5–5.1)
RBC # BLD: 5.11 M/CU MM (ref 4.2–5.4)
SEGMENTED NEUTROPHILS ABSOLUTE COUNT: 3.7 K/CU MM
SEGMENTED NEUTROPHILS RELATIVE PERCENT: 66.1 % (ref 36–66)
SODIUM BLD-SCNC: 142 MMOL/L (ref 135–145)
TOTAL PROTEIN: 7.5 GM/DL (ref 6.4–8.2)
WBC # BLD: 5.6 K/CU MM (ref 4–10.5)

## 2021-09-23 PROCEDURE — 85025 COMPLETE CBC W/AUTO DIFF WBC: CPT

## 2021-09-23 PROCEDURE — 99211 OFF/OP EST MAY X REQ PHY/QHP: CPT

## 2021-09-23 PROCEDURE — G8419 CALC BMI OUT NRM PARAM NOF/U: HCPCS | Performed by: INTERNAL MEDICINE

## 2021-09-23 PROCEDURE — 4004F PT TOBACCO SCREEN RCVD TLK: CPT | Performed by: INTERNAL MEDICINE

## 2021-09-23 PROCEDURE — 99406 BEHAV CHNG SMOKING 3-10 MIN: CPT | Performed by: RADIOLOGY

## 2021-09-23 PROCEDURE — 36415 COLL VENOUS BLD VENIPUNCTURE: CPT

## 2021-09-23 PROCEDURE — 3017F COLORECTAL CA SCREEN DOC REV: CPT | Performed by: INTERNAL MEDICINE

## 2021-09-23 PROCEDURE — G8428 CUR MEDS NOT DOCUMENT: HCPCS | Performed by: INTERNAL MEDICINE

## 2021-09-23 PROCEDURE — 99213 OFFICE O/P EST LOW 20 MIN: CPT | Performed by: INTERNAL MEDICINE

## 2021-09-23 PROCEDURE — 80053 COMPREHEN METABOLIC PANEL: CPT

## 2021-09-23 PROCEDURE — 99213 OFFICE O/P EST LOW 20 MIN: CPT | Performed by: RADIOLOGY

## 2021-09-23 ASSESSMENT — PAIN SCALES - GENERAL: PAINLEVEL_OUTOF10: 0

## 2021-09-23 ASSESSMENT — PAIN DESCRIPTION - FREQUENCY: FREQUENCY: INTERMITTENT

## 2021-09-23 NOTE — PROGRESS NOTES
Tin Culver  9/23/2021    Patient is seen today for follow up. Vitals:    09/23/21 0815   BP: (!) 151/83   Pulse: 88   Resp: 16   Temp: 96 °F (35.6 °C)   SpO2: 98%        Oxygen Therapy  SpO2: 98 %  Pulse Oximeter Device Mode: Intermittent  Pulse Oximeter Device Location: Right, Finger  O2 Device: None (Room air)    Wt Readings from Last 3 Encounters:   09/23/21 91 lb (41.3 kg)   03/25/21 93 lb 6.4 oz (42.4 kg)   03/19/21 93 lb (42.2 kg)       Pain Assessment  Pain Assessment: 0-10  Pain Level: 0  Pain Frequency: Intermittent  Denies Need for Intervention       Allergies   Allergen Reactions    Erythromycin Other (See Comments)     Was rushed to hospital, heart palpitations      Macrolides And Ketolides     Tape [Adhesive Tape]      blisters    Codeine Nausea And Vomiting    Vicodin [Hydrocodone-Acetaminophen] Nausea And Vomiting        Current Outpatient Medications   Medication Sig Dispense Refill    carvedilol (COREG) 3.125 MG tablet Take 1 tablet by mouth 2 times daily (with meals) 60 tablet 3    Naproxen Sodium (ALEVE) 220 MG CAPS Take 1 capsule by mouth every 6 hours as needed for Pain      ipratropium (ATROVENT) 0.02 % nebulizer solution Take 2.5 mLs by nebulization 4 times daily 2.5 mL 5     No current facility-administered medications for this encounter. Additional Comments: Patient here for a follow up with Dr. Ashley Ruiz. Denies any pain or complaints at this time.     Electronically signed by Anat Kennedy on 9/23/2021 at 8:18 AM

## 2021-09-23 NOTE — PROGRESS NOTES
[address] [address], [address] [facility]  Phone: [phone]  Fax: [phone]    RADIATION ONCOLOGY FOLLOW UP REPORT    PATIENT NAME:  Holly Walsh              : 1965  MEDICAL RECORD NO: [id number]    [facility] NO: [id number]        PROVIDER: Sammie Yang MD      DATE OF SERVICE: 2021     FOLLOW UP PHYSICIANS:   Primary Care: Bette Donovan MD       DIAGNOSIS:   Cancer Staging  Malignant neoplasm of middle lobe, bronchus or lung ([address]  Staging form: Lung, AJCC 8th Edition  - Clinical: Stage IA1 (cT1a, cN0, cM0) - Unsigned    Malignant neoplasm of upper lobe bronchus, left ([facility])  Staging form: Lung, AJCC 7th Edition  - Pathologic stage from 3/4/2014: Stage IIB (T3, N0, cM0) - Unsigned         TREATMENT COURSE:   Oncology History   Malignant neoplasm of upper lobe bronchus, left ([address]   2013 Initial Diagnosis    Malignant neoplasm of upper lobe bronchus, left ([address]     3/2014 Surgery    Left Pneumonectomy + MLND     3/16/2014 -  Chemotherapy    Carboplatin/Alimta      Radiation    The patient saw No care team member to display for radiation treatment. This is the current list of radiation treatment:  Radiation Treatments     No radiation treatments to show. (Treatments may have been administered in another system.)             Malignant neoplasm of middle lobe, bronchus or lung ([address]   6/15/2020 Initial Diagnosis    Malignant neoplasm of middle lobe, bronchus or lung ([address]     10/20/2020 Biopsy    Biopsy confirming NSCLC in RML     2020 - 2020 Radiation    SBRT to RML    18 Gy x 3 = 54 Gy  6MV photons with no flattening filter prescribed to periphery of tumor  SBRT (VMAT) using 2 arcs         CURRENT THERAPY:  Surveillance      INTERVAL HISTORY:   Doing well  Denies any new symptoms including chest pain, cough, hemoptysis, voice changes,worsened dyspnea.   Chest CT yesterday for surveillance  No 02/14/2014    bx lymph nodes    TONSILLECTOMY  as a kid       Family History   Problem Relation Age of Onset    Cancer Father         lung cancer    Heart Disease Father     Substance Abuse Father     Heart Attack Father     Depression Paternal Aunt     Arthritis Maternal Grandfather     Diabetes Maternal Grandfather     High Blood Pressure Maternal Grandfather     Vision Loss Maternal Grandfather     Cancer Paternal Grandfather     High Cholesterol Mother     Cancer Mother         throat cancer    Heart Disease Mother     Miscarriages / [place] Sister        Social History     Socioeconomic History    Marital status:      Spouse name: Not on file    Number of children: Not on file    Years of education: Not on file    Highest education level: Not on file   Occupational History    Not on file   Tobacco Use    Smoking status: Current Every Day Smoker     Packs/day: 0.25     Years: 43.00     Pack years: 10.75     Types: Cigarettes     Start date: 9/9/1977    Smokeless tobacco: Never Used    Tobacco comment: \"the most every smoke was a pack per day \"   Vaping Use    Vaping Use: Never used   Substance and Sexual Activity    Alcohol use: Yes     Comment: 2 drinks a month    Drug use: Yes     Types: Marijuana     Comment: daily- does not have medical marijuana card\"age 20's use to do coke- last did age 17\"    Sexual activity: Yes     Partners: Male   Other Topics Concern    Not on file   Social History Narrative    Not on file     Social Determinants of Health     Financial Resource Strain:     Difficulty of Paying Living Expenses:    Food Insecurity:     Worried About Running Out of Food in the Last Year:     Ran Out of Food in the Last Year:    Transportation Needs:     Lack of Transportation (Medical):      Lack of Transportation (Non-Medical):    Physical Activity:     Days of Exercise per Week:     Minutes of Exercise per Session:    Stress:     Feeling of Stress : Social Connections:     Frequency of Communication with Friends and Family:     Frequency of Social Gatherings with Friends and Family:     Attends Christianity Services:     Active Member of Clubs or Organizations:     Attends Club or Organization Meetings:     Marital Status:    Intimate Partner Violence:     Fear of Current or Ex-Partner:     Emotionally Abused:     Physically Abused:     Sexually Abused:          MEDICATIONS:     Current Outpatient Medications:     carvedilol (COREG) 3.125 MG tablet, Take 1 tablet by mouth 2 times daily (with meals), Disp: 60 tablet, Rfl: 3    Naproxen Sodium (ALEVE) 220 MG CAPS, Take 1 capsule by mouth every 6 hours as needed for Pain, Disp: , Rfl:     ipratropium (ATROVENT) 0.02 % nebulizer solution, Take 2.5 mLs by nebulization 4 times daily, Disp: 2.5 mL, Rfl: 5      REVIEW OF SYSTEMS:  Pertinent positive and negatives in History: the remainder of the 14-pt ROS is negative.       EXAMINATION:   Vitals:    09/23/21 0815   BP: (!) 151/83   Pulse: 88   Resp: 16   Temp: 96 °F (35.6 °C)   SpO2: 98%     A&O x3, NAD  Sclera anicteric, EOMI  No cervical/SCV LAD  Lungs CTAB  HR RRR, no m/r/g  No UE/LE edema  No spinal or other bony tenderness to firm palpation  CN 2-12 grossly intact, non-focal exam  Nl mood/affect/judgement      LABORATORY STUDIES:   CBC:   Lab Results   Component Value Date    WBC 6.3 12/18/2020    RBC 4.77 12/18/2020    HGB 14.8 12/18/2020    HCT 43.3 12/18/2020    MCV 90.8 12/18/2020    MCH 31.0 12/18/2020    MCHC 34.2 12/18/2020    RDW 14.5 12/18/2020     12/18/2020    MPV 10.0 12/18/2020     CMP:  Lab Results   Component Value Date     12/18/2020    K 4.0 12/18/2020     12/18/2020    CO2 21 12/18/2020    BUN 7 12/18/2020    CREATININE 0.7 12/18/2020    GFRAA >60 12/18/2020    LABGLOM >60 12/18/2020    GLUCOSE 96 12/18/2020    PROT 7.3 12/18/2020    LABALBU 4.5 12/18/2020    CALCIUM 9.6 12/18/2020    BILITOT 0.4 12/18/2020 ALKPHOS 130 12/18/2020    AST 22 12/18/2020    ALT 21 12/18/2020         RADIOLOGIC STUDIES:    CT CHEST W CONTRAST    Result Date: 9/21/2021  EXAMINATION: CT OF THE CHEST WITH CONTRAST 9/21/2021 10:47 am TECHNIQUE: CT of the chest was performed with the administration of intravenous contrast. Multiplanar reformatted images are provided for review. Dose modulation, iterative reconstruction, and/or weight based adjustment of the mA/kV was utilized to reduce the radiation dose to as low as reasonably achievable. COMPARISON: Chest CT 03/08/2021 HISTORY: ORDERING SYSTEM PROVIDED HISTORY: Malignant neoplasm of middle lobe, bronchus or lung [facility] TECHNOLOGIST PROVIDED HISTORY: Reason for exam:->Lung cancer, current or r/o recurrence; Lung cancer, r/o recurrence; No known/automatically detected potential contraindications to iodinated contrast Reason for Exam: Recheck Lung Cancer, smoker, asthma, sob, COPD, Emphysema, Surg- Lung, Inj 75cc Isovue 370 followed by saline flush FINDINGS: Mediastinum: No adenopathy. No acute findings. No pericardial effusion. Lungs/pleura: 5 mm right lower lobe pulmonary nodule axial image 131 is present, previously measuring 2 mm at this location. This is part of the previously described 7 mm nodule on the comparison from March 2021. The remainder of the ground-glass nodule is unchanged elsewhere but much increased solid component now measuring 5 mm, previously 2 mm. A separate 5 mm ground-glass nodule is unchanged, axial image 24. Ground-glass nodule in the right lower lobe measuring 3 mm image 126 is unchanged. Unchanged 5 mm ground-glass nodule posterior segment right upper lobe adjacent to the fissure shown on axial image 70.  6 mm nodule with ill-defined somewhat spiculated margins axial image 93 remains present, no significant change in appearance. Emphysema again noted. No pneumothorax. No pleural effusion. No pneumonia or pulmonary edema.  Upper Abdomen: No evidence of metastatic disease or acute findings of the upper abdomen. Hemangioma in the left hepatic lobe again noted measuring 1.4 cm. Nonobstructing 6 mm right renal stone noted incidentally as well. Incidental cyst of the superior left kidney measuring 1.8 cm. Soft Tissues/Bones: No adenopathy of the chest wall with normal size lymph nodes. No acute or aggressive osseous findings. Increased size of solid component of a mixed density nodule in the right lower lobe, 5 mm, previously 2 mm, much increased over the past 6 months. Close follow-up is recommended, chest CT in 3 months recommended. Stable tiny nodules elsewhere measuring less than 1 cm. No new findings elsewhere in the chest \    Ct Chest W Contrast    Result Date: 3/8/2021  EXAMINATION: CT OF THE CHEST WITH CONTRAST 3/8/2021 8:53 am TECHNIQUE: CT of the chest was performed with the administration of intravenous contrast. Multiplanar reformatted images are provided for review. Dose modulation, iterative reconstruction, and/or weight based adjustment of the mA/kV was utilized to reduce the radiation dose to as low as reasonably achievable. COMPARISON: 10/20/2020 HISTORY: ORDERING SYSTEM PROVIDED HISTORY: Malignant neoplasm of upper lobe of right lung [facility]) TECHNOLOGIST PROVIDED HISTORY: Reason for Exam: Recheck Lung Cancer, Surg- Lt lung removed, Inj 75cc Isovue 370 followed by saline flush Subsequent follow-up FINDINGS: Mediastinum: Leftward mediastinal shift. No central or lobar pulmonary embolus in the right lung. Thoracic aorta is normal caliber. Heart size is normal.  No pericardial effusion. No mediastinal or hilar adenopathy. Lungs/pleura: Status post left pneumonectomy with elevated left hemidiaphragm. Small amount of low-attenuation fluid in the left hemithorax, stable from the prior exam. Severe emphysema in the right lung.   In the right middle lobe along the right minor fissure, there is a 7 mm pulmonary nodule, previously measuring 8 mm on 09/02/2020. Compared to 10/18/2019, the nodule is increased in size and density, originally ground-glass in 2019. Extension along the minor fissure pleural surface has progressed. These changes are best characterized on the coronal reconstructions. In the lateral basal right lower lobe, there are a couple ground-glass density pulmonary nodules. 1 of the nodules measuring 7 mm has progressed from 10/18/2019. The adjacent subpleural 5 mm ground-glass density nodule is stable from 10/18/2019. In the posterior segment right upper lobe adjacent to the major fissure, there is a stable 5 mm ground-glass density nodule. No pleural effusion or pneumothorax. The central airways are clear. Upper Abdomen: Stable elevation of the left hemidiaphragm. Stable thickening of the left adrenal gland. Stable 15 mm low-attenuation lesion in the left hepatic lobe. Soft Tissues/Bones: No lytic or blastic osseous lesion. 1. Status post left pneumonectomy. No thoracic adenopathy. 2. Right middle lobe 7 mm nodule abutting the minor fissure. Additional 7 mm subpleural lateral basal right lower lobe ground-glass density nodule. Both nodules have increased in size and density compared to 10/18/2019. Adenocarcinoma spectrum lesion should be considered. 3. Severe emphysema. I personally reviewed relevant radiology images. MEDICAL DECISION MAKING:   I reviewed the Chest CT and compared [id number] to previous exam  The RLL nodule appears very similar to the scan in March  At this point, there is no clinical or radiographic evidence of recurrence  Treated lesion in RML is stable to slightly decreased in size  Will monitor at 3 month interval with Chest CT per radiology recommendations    She has received COVID vaccine    We discussed smoking cessation, habits leading to her smoking after meals, social support (or lack of) at home with a spouse who continues smoking, and various cessation aides for >3 minutes.     Labs drawn today prior to visit with medical oncology      MEDICAL DECISION MAKING    Number/Complexity of Problems Addressed  [] 1 self-limited of minor problem (23104/21165)  [x] >=2 self-limited or minor problems, 1 stable chronic illness, 1 acute/uncomplicated illness/injury (12303/34340)  []Chronic illnesses with exacerbation/progression/side effects of treatment, >=2 stable chronic illnesses, 1 undiagnosed new problem with uncertain prognosis, 1 acute illness with systemic symptoms, 1 acute complicated injury (05133/15997)  []Chronic illnesses with severe exacerbation/progression/treatment side effects, acute or chronic illness or injury that poses a threat to life or bodily function (47246/97798)    Amount and/or Complexity of Data Reviewed  [] Minimal or none (41338/41533)  [] Limited - meets 1/2 categories (92209/62392)  1. At least 2 of: review of prior external notes from each unique source, review results of each unique test, ordering of each unique test  2. Assessment requiring an independent historian  [] Moderate - meets 1/3 categories (95893/94272)  1. Any 3 of: Review of prior external notes from each unique source, review results of each unique test, ordering of each unique test, assessment requiring an independent historian  2. Independent interpretation of tests  3. Discussion of management or test interpretation  [x] Extensive - meets 2/3 categories (26312/71363)  1. Any 3 of: Review of prior external notes from each unique source, review results of each unique test, ordering of each unique test, assessment requiring an independent historian  2. Independent interpretation of tests  3.  Discussion of management or test interpretation    Risk of complications and/or morbidity/mortality of patient management  [] Minimal (40490/58882)  [x] Low (01256/33951)  [] Moderate (30221/07358)  Examples: Rx drug management, decision regarding minor surgery with identified patient or procedure risk factors, decision regarding elective major surgery without identified patient or procedure risk factors, diagnosis or treatment significantly limited by social determinants of health  [] High (39325/60126)  Examples: drug therapy requiring intensive monitoring for toxicity, decision regarding elective major surgery with identified patient/procedure risk factors, decision regarding emergency major surgery, decision regarding hospitalization, decision for DNR or de-escalation of care because of poor prognosis    LEVEL OF MDM (based on 2/3 above categories)  [] Straightforward (56050/10581)  [x] Low (44730/02868)  [] Moderate (21951/77763)  [] High (31495/10637)      Electronically signed by Electronically signed by Home Negron MD on 9/23/2021 at 8:40 AM

## 2021-11-29 NOTE — PROGRESS NOTES
Patient Name: Jerelene Schlatter  Patient : 1965  Patient MRN: [id number]     Primary Oncologist: Crystal Li MD  Referring Provider: Mike Levi MD     Date of Service: 2021      Chief Complaint:   Chief Complaint   Patient presents with    Follow-up     She came in for follow-up visit. Patient Active Problem List:     Syncope     Tobacco use disorder     Hyperlipidemia        Malignant neoplasm of upper lobe bronchus, left      Pulmonary nodule     HPI:   Jaden Bateman is a pleasant 50-year-old female patient with history of lung surgery due to the bullae in , performed by Dr. Bishop Byrnes to prevent lung collapse, and high-grade adenocarcinoma of the lung, status post left upper lobe mass biopsy on 2014. Pathology report showed high-grade carcinoma with abundant necrosis. Immunostain study showed that the tumor cells were positive for CK7 and TTF1 and negative for CK5-6, Napsin A, and B63. The tumor was consistent with high-grade adenocarcinoma of the lung origin. EGFR and ALK rearrangement studies were negative. She went to the emergency room in 2013 with complaint of pain to the ribcage. CT chest without contrast on 2013 showed a 6.2 by 4.8 by 6.9 cm partially calcified large infiltrative mass involving the left upper lobe and multiple small mediastinal and left hilar nodes. Metastatic disease could not be excluded. Indeterminate hypodense lesions within the liver and involving the pancreas were described. Left adrenal gland was mildly hyperplastic. CT abdomen and pelvis on 2013 showed benign appearing left hepatic lobe mass, which was seen in 2007. This was probably an hemangioma. A smaller satellite nodule appeared benign and was probably a cyst.  No compelling evidence for solid organ metastases. There was no evidence of abnormal enhancement in the pancreatic head or neck.   Benign appearing simple cysts in the upper pole of the left kidney and bilateral nephrolithiasis were described. I discussed with her,  therefore, biopsy of the liver was not done. Blood test in December 2013 showed sodium 135, potassium 3.8, bun 17, creatinine 0.7, white cell 5.4, hemoglobin 14.2, platelet 411. CMP was December 24, 2013, showed normal sodium at 140, potassium 4.0, BUN 20, creatinine 0.7, calcium 9.8, alk phos 136, ALT 16, AST 21, albumin 4.1, total protein 7.6, total bilirubin 0.4. Pulmonary function test was okay. She was seen by Dr. Anne Marie Scruggs at [facility], who  ordered pretest study on February 7, 2014, including MRI of the brain, echo, and stress test.    PET-CT scan on January 13, 2014 showed:  1. Large left lung neoplasm., 2. Probable left hilar and mediastinal puja metastases. , 3. Asymmetric hypermetabolic activity involving the cecum. CT brain on January 8, 2014 was a normal study. Mediastinoscopy on February 14, 2014 showed: reportedly all nodes sampled were negative for the cancer. She had left lung pneumonectomy with lymph node sampling on February 27, 2014. Pathology report showed solid predominant adenocarcinoma measuring 7.9 by 6.2 by 5.1 cm in the left upper lobe, grade 3. No visceral invasion present. The tumor was confined to the lung parenchyma. The bronchial margin was negative. There was extensive lymphatic/vascular invasion and one in two lymph nodes was negative for malignancy and pathologically she was staged as T3, N0, MX.  ALK study was negative for rearrangements. She was recommended to consider adjuvant chemotherapy. I went over the NCCN Guideline and I put her on cisplatin/Alimta regimen. She received first adjuvant treatment with Alimta/cisplatin on March 19, 2014, and completed the fourth cycle of adjuvant cisplatinum/Alimta on May 21, 2014.     Blood test in May 2014 showed white cell count of 3.7, hemoglobin 13.3, platelet 274, BUN 16, creatinine 0.6, calcium 9.0, albumin 4.5, total protein 7.4, total bilirubin 0.4, alk phos 143, AST 23, ALT 18, magnesium 2.0. I planned to have a CT scan every six months in the first two or three years and yearly afterwards. CT chest in June 2014 showed no evidence of metastatic disease. She passed a kidney stone. Blood tests in December 2014 showed normal CBC. CMP was stable, with alk phos 150 and calcium 9.2. A CT of the chest in December 2014 showed no evidence of metastatic disease and no significant interval change compared to previous study. She had colonoscopy in July 2014 by Dr. Mitesh Canela and reportedly there were no polyps. Blood test in June 2015 showed normal CBC. CMP was stable with alk phos 165. CT chest in June 2015 showed no evidence of progression of the disease. The osseous and soft tissue structures appeared otherwise normal.   CT chest in December 2015 revealed no evidence of metastatic disease or progression of the cancer. Blood test in October 2015 revealed normal CBC and CMP. TSH was 0.88, ferritin 78, B12 281. Iron was 92. Ferritin was 78. CMP was within normal limits. Blood test in July 2016 revealed normal CBC and CMP. CT chest in June 2016 revealed no evidence of progression of the disease. CT chest in April 2018 revealed stable post surgical changes from the left pneumonectomy with stable loculated pleural effusion and no evidence of metastatic disease in the chest. 4 mm ground-glass nodule in the right upper lobe was noted. F/U CT chest in July 2018 revealed : previously visualized ground-glass nodule no longer visualized. No significant change in appearance of the chest otherwise. CXR in April 2019 showed no acute abnormality. She quit smoking in March 2019. CT chest in October 2019 showed :  1. Stable postsurgical changes from left pneumonectomy. 2. New 5 mm subsolid nodule in the right middle lobe adjacent to the minor fissure.  This may have been present on the prior exams but had a more subtle ground-glass appearance on the prior exams. Recommend a short-term follow-up in 3 months versus per clinical protocol. 3. Emphysematous changes. Maternal aunt had recurrent lung cancer. CT chest in January 2020 showed stable 6 mm irregular RML nodule, more solid concerning for malignancy. Too small for PET or biopsy. She went to [address] [place] [address].  CT chest on May 4, 2020 showed slight increased size of the spiculated subpleural right middle lobe nodule, now approximately 10 mm maximal diameter previously 6 mm. Other ill-defined groundglass nodules in the right upper lobe and right lower lobe appeared similar. Suspect simple appearing left renal and left hepatic cyst, nonobstructing right nephrolithiasis. I discussed with Dr. Jef Parisi, thoracic surgeon who is agreeable with PET CT scan. She smokes few cigarettes a day. She decided that she may not consider any chemotherapy, should the disease come back again  CBC was stable. CMP showed increased alkaline phosphatase. PET CT scan in June 2020 and CT chest in September 2020 were reviewed. Due to enlarging nodule I have referred to radiation oncologist for potential stereotactic radiotherapy. She underwent bronchoscopy with navigational bronchoscopy on October 22, 2020. She developed pneumothorax. Final Cytologic Diagnosis:   A.  Right Middle Lobe Lung Brushing cytology with cell block:        POSITIVE FOR MALIGNANCY.     B.  Right Middle Lobe Lavage cytology with cell block:        Nondiagnostic.     C.  Right Middle Lobe Lung Fine Needle Aspirate cytology with cell block:        POSITIVE FOR MALIGNANCY. She received stereotactic radiotherapy for 3 days and completed on November 30, 2020. CT chest on March 8, 2021  1. Status post left pneumonectomy.  No thoracic adenopathy.   2. Right middle lobe 7 mm nodule abutting the minor fissure.  Additional 7 mm subpleural lateral basal right lower lobe ground-glass density nodule.  Both nodules have increased in size and density compared to 10/18/2019. Adenocarcinoma spectrum lesion should be considered. 3. Severe emphysema. I compared to PET scan in June 2020 and she was agreeable to have follow up CT chest in 6 months which is already scheduled by Dr Jennifer Torre. Chest CT on September 21, 2021 showed  Increased size of solid component of a mixed density nodule in the right lower lobe, 5 mm, previously 2 mm, much increased over the past 6 months. Close follow-up is recommended, chest CT in 3 months recommended.    Stable tiny nodules elsewhere measuring less than 1 cm.  No new findings elsewhere in the chest  She saw radiation oncologist today who ordered follow-up CT chest in 3 months. She has been using CBD gummy she ordered online. It seemed to help her anxiety and pain. Again we discussed about smoking cessation. We will check labs today. I advised to call for the test result. On December 29, 2021 she came in for follow-up visit. CT chest on December 27, 2021 revealed  1. Slight interval decrease in size of a 4 mm irregular nodule in the right middle lobe previously 6 mm.  There is mild surrounding ground-glass attenuation likely reflecting posttreatment changes. 2. There is a new part solid 6 mm nodule in the anterior inferior right upper lobe as well as a new 4 mm solid nodule just anterior to the right minor fissure.  Subtle slight interval increase in size of a 6 mm solid nodule in the right lower lobe laterally.  Otherwise stable subcentimeter pulmonary  nodules throughout the right lung. 3. Stable postsurgical changes from left pneumonectomy.  There is mucoid material within the left residual mainstem bronchus. Labs in December 2021 showed slight increase hemoglobin at 16.1 and alk phos 158. Alk phos has been fluctuating since 2013. She smokes 5 cigarettes a day. Again we discussed about smoking cessation.   She is agreeable to have follow-up CT chest in 3 months to assess the lung nodule. She will see radiation oncologist today also. Remind her about colonoscopy and she will discuss with family doctor. She has chronic arthritic pain. She has chronic pain at surgical site. No nausea, vomiting or diarrhea. No fever or chills. No chest pain, shortness of breath or palpitation. No headache or dizzy spell. No specific bone pain. No melena or hematochezia. Denied any dysuria or hematuria. PAST MEDICAL HISTORY:  Lung disease, status post lung surgery in 2004, history of bilateral mastectomy due to the frequent lumpectomies in 2005 by Dr. Kyara Brooks,  cholecystectomy in [facility], [facility] in 2012. Dyslipidemia. FAMILY HISTORY:  Father had lung cancer and he was a smoker. Mother had throat cancer and colon cancer in her seventies. Maternal grandfather had colon cancer in his eighties. One maternal aunt had lung cancer. She stated one cousin was diagnosed with pancreatic cancer. SOCIAL HISTORY:  She smoked about one pack per day for 35 years and is using electronic cigarettes. She drinks alcohol occasionally. She is  and has one child. She has been retired since 2014    LABORATORY STUDIES:   January 6, 2014: White cell count 9.2, hemoglobin 13.4, platelet 688, BUN 17, creatinine 0.6, AST 20, ALT 11, calcium 9.6, alk phos 113. Review of Systems: \"Per interval history; otherwise 10 point ROS is negative. \"     Vital Signs:  /87 (Position: Sitting, Cuff Size: Medium Adult)   Pulse 99   Temp 97.5 °F (36.4 °C) (Temporal)   Resp 16   Ht 5' (1.524 m)   Wt 90 lb 6.4 oz (41 kg)   LMP 01/12/2010   SpO2 95%   BMI 17.66 kg/m²     Physical Exam:  CONSTITUTIONAL: awake, alert, cooperative, no apparent distress   EYES: pupils equal, round and reactive to light, sclera clear and conjunctiva normal  ENT: Normocephalic, without obvious abnormality, atraumatic  NECK: supple, symmetrical, no jugular venous distension and no carotid bruits   HEMATOLOGIC/LYMPHATIC: no Component Value Date    PROTIME 10.8 (L) 10/12/2020    INR 0.89 10/12/2020    APTT 28.7 10/12/2020      Assessment & Plan:  1. She had stage II adenocarcinoma of the lung. EGFR and ALK study was negative. She had surgery of the lung and was placed on adjuvant chemotherapy, cisplatin and Alimta. She completed adjuvant chemotherapy on May 21, 2014. CBC in June 2014 was within normal limits. CMP was stable for her with alk phos 157. CT chest in June 2016 was negative for recurrent disease. CBC and CMP in July 2016 were within normal limits. CT chest in April 2017 revealed no evidence of progression of the disease. CT chest in April 2018 revealed stable findings with 4 mm ground-glass nodule to the right upper lung. Follow-up CT chest in July 2018 was reviewed. CXR in April 2019 was non acute. CT chest in October 2019 showed stable findings with new 5 mm sub-solid nodule in the right middle lobe. CT chest in January 2020 was reviewed. CT chest in May 2020 showed enlarging right middle lung nodule. PET scan in June 2020 and CT chest in September 2020 were reviewed. Since the nodule continues to get larger. I referred to see radiation oncologist for potential stereotactic radiotherapy. She has bronchoscopy in October 2021 and pathology report was positive for NSCLC. She had stereotactic radiotherapy to [facility] until November 30, 2020. CT  Chest in September 2021 was reviewed. CT chest in December 2021 was reviewed. Labs were reviewed. She is agreeable to have follow-up CT chest in 3 months. We can discuss about smoking cessation. 2.  I advised her to keep her other age appropriate cancer screening up-to-date. She had bilateral mastectomy, and colonoscopy in 2014. She will discuss with family doctor about her follow-up colonoscopy. 3.  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 We again discussed about smoking cessation. Check labs today.   She has been taking CBD gummy which she ordered online. We discussed about healthy diet and lifestyle. She had covid vaccine. RTC in 3 months or sooner. All of her questions have been answered for today. Recent imaging and labs were reviewed and discussed with the patient.

## 2021-12-22 ENCOUNTER — HOSPITAL ENCOUNTER (OUTPATIENT)
Dept: INFUSION THERAPY | Age: 56
Discharge: HOME | End: 2021-12-22
Payer: MEDICARE

## 2021-12-22 DIAGNOSIS — C34.2 MALIGNANT NEOPLASM OF MIDDLE LOBE, BRONCHUS OR LUNG (HCC): ICD-10-CM

## 2021-12-22 DIAGNOSIS — D75.1 ERYTHROCYTOSIS: ICD-10-CM

## 2021-12-22 LAB
ALBUMIN SERPL-MCNC: 4.7 GM/DL (ref 3.4–5)
ALP BLD-CCNC: 158 IU/L (ref 40–128)
ALT SERPL-CCNC: 23 U/L (ref 10–40)
ANION GAP SERPL CALCULATED.3IONS-SCNC: 14 MMOL/L (ref 4–16)
AST SERPL-CCNC: 23 IU/L (ref 15–37)
BASOPHILS ABSOLUTE: 0 K/CU MM
BASOPHILS RELATIVE PERCENT: 0.3 % (ref 0–1)
BILIRUB SERPL-MCNC: 0.7 MG/DL (ref 0–1)
BUN BLDV-MCNC: 13 MG/DL (ref 6–23)
CALCIUM SERPL-MCNC: 9.9 MG/DL (ref 8.3–10.6)
CHLORIDE BLD-SCNC: 102 MMOL/L (ref 99–110)
CO2: 25 MMOL/L (ref 21–32)
CREAT SERPL-MCNC: 0.7 MG/DL (ref 0.6–1.1)
DIFFERENTIAL TYPE: ABNORMAL
EOSINOPHILS ABSOLUTE: 0.1 K/CU MM
EOSINOPHILS RELATIVE PERCENT: 1.3 % (ref 0–3)
GFR AFRICAN AMERICAN: >60 ML/MIN/1.73M2
GFR NON-AFRICAN AMERICAN: >60 ML/MIN/1.73M2
GLUCOSE BLD-MCNC: 127 MG/DL (ref 70–99)
HCT VFR BLD CALC: 46.8 % (ref 37–47)
HEMOGLOBIN: 16.1 GM/DL (ref 12.5–16)
LYMPHOCYTES ABSOLUTE: 3 K/CU MM
LYMPHOCYTES RELATIVE PERCENT: 49.8 % (ref 24–44)
MCH RBC QN AUTO: 31 PG (ref 27–31)
MCHC RBC AUTO-ENTMCNC: 34.4 % (ref 32–36)
MCV RBC AUTO: 90 FL (ref 78–100)
MONOCYTES ABSOLUTE: 0.6 K/CU MM
MONOCYTES RELATIVE PERCENT: 9.4 % (ref 0–4)
PDW BLD-RTO: 14.8 % (ref 11.7–14.9)
PLATELET # BLD: 249 K/CU MM (ref 140–440)
PMV BLD AUTO: 10 FL (ref 7.5–11.1)
POTASSIUM SERPL-SCNC: 3.9 MMOL/L (ref 3.5–5.1)
RBC # BLD: 5.2 M/CU MM (ref 4.2–5.4)
SEGMENTED NEUTROPHILS ABSOLUTE COUNT: 2.3 K/CU MM
SEGMENTED NEUTROPHILS RELATIVE PERCENT: 39.2 % (ref 36–66)
SODIUM BLD-SCNC: 141 MMOL/L (ref 135–145)
TOTAL PROTEIN: 7.3 GM/DL (ref 6.4–8.2)
WBC # BLD: 6 K/CU MM (ref 4–10.5)

## 2021-12-22 PROCEDURE — 36415 COLL VENOUS BLD VENIPUNCTURE: CPT

## 2021-12-22 PROCEDURE — 80053 COMPREHEN METABOLIC PANEL: CPT

## 2021-12-22 PROCEDURE — 85025 COMPLETE CBC W/AUTO DIFF WBC: CPT

## 2021-12-27 ENCOUNTER — HOSPITAL ENCOUNTER (OUTPATIENT)
Dept: CT IMAGING | Age: 56
Discharge: HOME | End: 2021-12-27
Payer: MEDICARE

## 2021-12-27 DIAGNOSIS — C34.2 MALIGNANT NEOPLASM OF MIDDLE LOBE OF LUNG (HCC): ICD-10-CM

## 2021-12-27 PROCEDURE — 6360000004 HC RX CONTRAST MEDICATION: Performed by: INTERNAL MEDICINE

## 2021-12-27 PROCEDURE — 71260 CT THORAX DX C+: CPT

## 2021-12-27 RX ADMIN — IOPAMIDOL 75 ML: 755 INJECTION, SOLUTION INTRAVENOUS at 10:26

## 2021-12-29 ENCOUNTER — HOSPITAL ENCOUNTER (OUTPATIENT)
Dept: RADIATION ONCOLOGY | Age: 56
Discharge: HOME | End: 2021-12-29
Attending: RADIOLOGY
Payer: MEDICARE

## 2021-12-29 ENCOUNTER — OFFICE VISIT (OUTPATIENT)
Dept: ONCOLOGY | Age: 56
End: 2021-12-29
Payer: MEDICARE

## 2021-12-29 ENCOUNTER — HOSPITAL ENCOUNTER (OUTPATIENT)
Dept: INFUSION THERAPY | Age: 56
Discharge: HOME | End: 2021-12-29
Payer: MEDICARE

## 2021-12-29 VITALS
HEART RATE: 99 BPM | SYSTOLIC BLOOD PRESSURE: 132 MMHG | BODY MASS INDEX: 17.75 KG/M2 | TEMPERATURE: 97.5 F | WEIGHT: 90.4 LBS | OXYGEN SATURATION: 95 % | RESPIRATION RATE: 16 BRPM | HEIGHT: 60 IN | DIASTOLIC BLOOD PRESSURE: 87 MMHG

## 2021-12-29 VITALS
SYSTOLIC BLOOD PRESSURE: 132 MMHG | WEIGHT: 90.4 LBS | TEMPERATURE: 97.5 F | RESPIRATION RATE: 16 BRPM | HEIGHT: 60 IN | HEART RATE: 99 BPM | DIASTOLIC BLOOD PRESSURE: 87 MMHG | OXYGEN SATURATION: 95 % | BODY MASS INDEX: 17.75 KG/M2

## 2021-12-29 DIAGNOSIS — R91.8 LUNG NODULES: Primary | ICD-10-CM

## 2021-12-29 PROCEDURE — 3017F COLORECTAL CA SCREEN DOC REV: CPT | Performed by: INTERNAL MEDICINE

## 2021-12-29 PROCEDURE — 99211 OFF/OP EST MAY X REQ PHY/QHP: CPT

## 2021-12-29 PROCEDURE — G8428 CUR MEDS NOT DOCUMENT: HCPCS | Performed by: INTERNAL MEDICINE

## 2021-12-29 PROCEDURE — 99213 OFFICE O/P EST LOW 20 MIN: CPT | Performed by: INTERNAL MEDICINE

## 2021-12-29 PROCEDURE — G8419 CALC BMI OUT NRM PARAM NOF/U: HCPCS | Performed by: INTERNAL MEDICINE

## 2021-12-29 PROCEDURE — G8484 FLU IMMUNIZE NO ADMIN: HCPCS | Performed by: INTERNAL MEDICINE

## 2021-12-29 PROCEDURE — 99213 OFFICE O/P EST LOW 20 MIN: CPT | Performed by: RADIOLOGY

## 2021-12-29 PROCEDURE — 4004F PT TOBACCO SCREEN RCVD TLK: CPT | Performed by: INTERNAL MEDICINE

## 2021-12-29 ASSESSMENT — PAIN DESCRIPTION - ORIENTATION: ORIENTATION: LEFT

## 2021-12-29 ASSESSMENT — PAIN SCALES - GENERAL: PAINLEVEL_OUTOF10: 5

## 2021-12-29 ASSESSMENT — PAIN DESCRIPTION - PAIN TYPE: TYPE: CHRONIC PAIN

## 2021-12-29 ASSESSMENT — PAIN DESCRIPTION - LOCATION: LOCATION: ABDOMEN

## 2021-12-29 NOTE — PROGRESS NOTES
MA Rooming Questions  Patient: Franklyn Clemens  MRN: [id number]    Date: 12/29/2021        1. Do you have any new issues?   no         2. Do you need any refills on medications?    no    3. Have you had any imaging done since your last visit? Yes-CT    4. Have you been hospitalized or seen in the emergency room since your last visit here?   no    5. Did the patient have a depression screening completed today?  No    No data recorded     PHQ-9 Given to (if applicable):               PHQ-9 Score (if applicable):                     [] Positive     []  Negative              Does question #9 need addressed (if applicable)                     [] Yes    []  No               Shyam Benz CMA

## 2021-12-29 NOTE — PROGRESS NOTES
[address] [id number] [facility], [address] [facility]  Phone: [phone]  Fax: [phone]    RADIATION ONCOLOGY FOLLOW UP REPORT    PATIENT NAME:  Kathleen Ricardo              : 1965  MEDICAL RECORD NO: [id number]    [facility] NO: [id number]        PROVIDER: Lorena Griggs MD      DATE OF SERVICE: 2021     FOLLOW UP PHYSICIANS:   Primary Care: Alem De La Garza MD       DIAGNOSIS:   Cancer Staging  Malignant neoplasm of middle lobe, bronchus or lung ([facility]  Staging form: Lung, AJCC 8th Edition  - Clinical: Stage IA1 (cT1a, cN0, cM0) - Unsigned    Malignant neoplasm of upper lobe bronchus, left (HCC)  Staging form: Lung, AJCC 7th Edition  - Pathologic stage from 3/4/2014: Stage IIB (T3, N0, cM0) - Unsigned         TREATMENT COURSE:   Oncology History   Malignant neoplasm of upper lobe bronchus, left ([address]   2013 Initial Diagnosis    Malignant neoplasm of upper lobe bronchus, left ([address]     3/2014 Surgery    Left Pneumonectomy + MLND     3/16/2014 -  Chemotherapy    Carboplatin/Alimta      Radiation    The patient saw No care team member to display for radiation treatment. This is the current list of radiation treatment:  Radiation Treatments     No radiation treatments to show. (Treatments may have been administered in another system.)             Malignant neoplasm of middle lobe, bronchus or lung ([address]   6/15/2020 Initial Diagnosis    Malignant neoplasm of middle lobe, bronchus or lung ([address]     10/20/2020 Biopsy    Biopsy confirming NSCLC in RML     2020 - 2020 Radiation    SBRT to RML    18 Gy x 3 = 54 Gy  6MV photons with no flattening filter prescribed to periphery of tumor  SBRT (VMAT) using 2 arcs         CURRENT THERAPY:  Surveillance      INTERVAL HISTORY:   Doing well  Denies any new symptoms including chest pain, cough, hemoptysis, voice changes,worsened dyspnea.   Chest CT yesterday for surveillance  No HA,nausea,vomiting, bone pains, abd pain, changes in bowels/bladder,weight loss      Past Medical History:   Diagnosis Date    Acid reflux     Anemia     Asthma     Cerebral artery occlusion with cerebral infarction ([facility] [address]     COPD (chronic obstructive pulmonary disease) (HCC)     \"use to see Dr Lino Wallace but do not care for him\"    Full dentures     Gestational diabetes     \"had gestational diabetess 1984\"\"no trouble with sugar since then\"    History of external beam radiation therapy 12/2020    RML SBRT- 5,400 cGy [facility]    History of kidney stones     \"passed a kidney stone approx 2016\"\"they said I had 3 stones and know for sure passed one\"    Hyperlipidemia     Lung cancer ([facility] [address]     \"had left lung cancer - removed the lung- 2013= tx with chemo treatments- follow with Dr Jessica Taylor"    Lung nodule     \"one spot on right lung- having bronchoscopy\"( 10/16/2020)    Pneumonia 2018    emphysema, COPD    Pneumothorax     TIA (transient ischemic attack)     \"had TIA in 2012- passed out had weakness right side,affected my memory- took approx 6 months for everything to come back\"        Past Surgical History:   Procedure Laterality Date    BREAST SURGERY  2012    double mastectomy (fibroid) after several lumpectomies    BRONCHOSCOPY N/A 10/20/2020    BRONCHOSCOPY ISAAC performed by Amado Lombard, MD at [address]  07/2014    CT GUIDED CHEST TUBE  10/20/2020    CT GUIDED CHEST TUBE 10/20/2020 [facility] CT SCAN    HERNIA REPAIR  2010    ventral hernia repair    LUNG BIOPSY Left 01/02/2014    Left upper lobe mass    LUNG SURGERY Left 02/27/2014    Left peunomectomy with lymph node sampling    LUNG SURGERY  2001    MEDIASTINOSCOPY  02/14/2014    bx lymph nodes    TONSILLECTOMY  as a kid       Family History   Problem Relation Age of Onset    Cancer Father         lung cancer    Heart Disease Father     Substance Abuse Father     Heart Attack Father    [employer] Depression Paternal Aunt     Arthritis Maternal Grandfather     Diabetes Maternal Grandfather     High Blood Pressure Maternal Grandfather     Vision Loss Maternal Grandfather     Cancer Paternal Grandfather     High Cholesterol Mother     Cancer Mother         throat cancer    Heart Disease Mother     Miscarriages / [place] Sister        Social History     Socioeconomic History    Marital status:      Spouse name: Not on file    Number of children: Not on file    Years of education: Not on file    Highest education level: Not on file   Occupational History    Not on file   Tobacco Use    Smoking status: Current Every Day Smoker     Packs/day: 0.25     Years: 43.00     Pack years: 10.75     Types: Cigarettes     Start date: 9/9/1977    Smokeless tobacco: Never Used    Tobacco comment: \"the most every smoke was a pack per day \"   Vaping Use    Vaping Use: Never used   Substance and Sexual Activity    Alcohol use: Yes     Comment: 2 drinks a month    Drug use: Yes     Types: Marijuana (Weed)     Comment: daily- does not have medical marijuana card\"age 20's use to do coke- last did age 17\"    Sexual activity: Yes     Partners: Male   Other Topics Concern    Not on file   Social History Narrative    Not on file     Social Determinants of Health     Financial Resource Strain:     Difficulty of Paying Living Expenses: Not on file   Food Insecurity:     Worried About Running Out of Food in the Last Year: Not on file    Fabi of Food in the Last Year: Not on file   Transportation Needs:     Lack of Transportation (Medical): Not on file    Lack of Transportation (Non-Medical):  Not on file   Physical Activity:     Days of Exercise per Week: Not on file    Minutes of Exercise per Session: Not on file   Stress:     Feeling of Stress : Not on file   Social Connections:     Frequency of Communication with Friends and Family: Not on file    Frequency of Social Gatherings with Friends and Family: Not on file    Attends Advent Services: Not on file    Active Member of Clubs or Organizations: Not on file    Attends Club or Organization Meetings: Not on file    Marital Status: Not on file   Intimate Partner Violence:     Fear of Current or Ex-Partner: Not on file    Emotionally Abused: Not on file    Physically Abused: Not on file    Sexually Abused: Not on file   Housing Stability:     Unable to Pay for Housing in the Last Year: Not on file    Number of [place] in the Last Year: Not on file    Unstable Housing in the Last Year: Not on file         MEDICATIONS:     Current Outpatient Medications:     Naproxen Sodium (ALEVE) 220 MG CAPS, Take 1 capsule by mouth every 6 hours as needed for Pain, Disp: , Rfl:     ipratropium (ATROVENT) 0.02 % nebulizer solution, Take 2.5 mLs by nebulization 4 times daily, Disp: 2.5 mL, Rfl: 5      REVIEW OF SYSTEMS:  Pertinent positive and negatives in History: the remainder of the 14-pt ROS is negative.       EXAMINATION:   Vitals:    12/29/21 0829   BP: 132/87   Pulse: 99   Resp: 16   Temp: 97.5 °F (36.4 °C)   SpO2: 95%     A&O x3, NAD  Sclera anicteric, EOMI  No cervical/SCV LAD  Lungs CTAB  HR RRR, no m/r/g  No UE/LE edema  No spinal or other bony tenderness to firm palpation  CN 2-12 grossly intact, non-focal exam  Nl mood/affect/judgement      LABORATORY STUDIES:   CBC:   Lab Results   Component Value Date    WBC 6.0 12/22/2021    RBC 5.20 12/22/2021    HGB 16.1 12/22/2021    HCT 46.8 12/22/2021    MCV 90.0 12/22/2021    MCH 31.0 12/22/2021    MCHC 34.4 12/22/2021    RDW 14.8 12/22/2021     12/22/2021    MPV 10.0 12/22/2021     CMP:  Lab Results   Component Value Date     12/22/2021    K 3.9 12/22/2021     12/22/2021    CO2 25 12/22/2021    BUN 13 12/22/2021    CREATININE 0.7 12/22/2021    GFRAA >60 12/22/2021    LABGLOM >60 12/22/2021    GLUCOSE 127 12/22/2021    PROT 7.3 12/22/2021    LABALBU 4.7 12/22/2021    CALCIUM 9.9 12/22/2021    BILITOT 0.7 12/22/2021    ALKPHOS 158 12/22/2021    AST 23 12/22/2021    ALT 23 12/22/2021         RADIOLOGIC STUDIES:    CT CHEST W CONTRAST    Result Date: 12/27/2021  EXAMINATION: CT OF THE CHEST WITH CONTRAST 12/27/2021 10:02 am TECHNIQUE: CT of the chest was performed with the administration of intravenous contrast. Multiplanar reformatted images are provided for review. Dose modulation, iterative reconstruction, and/or weight based adjustment of the mA/kV was utilized to reduce the radiation dose to as low as reasonably achievable. COMPARISON: 09/21/2021, 03/08/2021 HISTORY: ORDERING SYSTEM PROVIDED HISTORY: Malignant neoplasm of middle lobe of lung ([address] TECHNOLOGIST PROVIDED HISTORY: Reason for Exam: Malignant neoplasm of middle lobe of lung ([address] Follow-up exam FINDINGS: Mediastinum: There is stable mediastinal shift towards the left. The heart size within normal limits. Thoracic aorta is normal in caliber with mild atherosclerosis. The main pulmonary artery is within normal limits. No new pathologically enlarged adenopathy. The esophagus is unremarkable. Lungs/pleura: Postsurgical changes from left pneumonectomy. There is a stable loculated left pleural effusion within the pneumonectomy bed with mild pleural thickening. Mucoid material is noted within the residual left mainstem bronchus. Slight interval decrease in size of a 4 mm nodule in the right middle lobe previously 6 mm. There is mild ground-glass attenuation in the right middle lobe and anterior inferior right upper lobe along the minor fissure which may reflect posttreatment changes. There is a new part solid 6 mm nodule in the anterior inferior right upper lobe (image 80). There is also a new 4 mm solid nodule along the anterior minor fissure (image 73). There is a 6 mm solid nodule in the lateral right lower lobe previously 5 mm (image 105).   Few other smaller part solid nodules in the right lower lobe are otherwise stable to slightly decreased in size (for example image 99 and 100). Moderate emphysematous changes. No right pleural effusion or pneumothorax. There is a stable 5 mm part solid nodule along the anterior right major fissure in the upper lobe (image 53). Upper Abdomen: No acute findings in the upper abdomen. Stable hemangioma within the left liver. Stable thickening of the left adrenal gland. Soft Tissues/Bones: No acute findings in the bones or soft tissues. Postthoracotomy changes are likely present on the left rib cage. 1. Slight interval decrease in size of a 4 mm irregular nodule in the right middle lobe previously 6 mm. There is mild surrounding ground-glass attenuation likely reflecting posttreatment changes. 2. There is a new part solid 6 mm nodule in the anterior inferior right upper lobe as well as a new 4 mm solid nodule just anterior to the right minor fissure. Subtle slight interval increase in size of a 6 mm solid nodule in the right lower lobe laterally. Otherwise stable subcentimeter pulmonary nodules throughout the right lung. 3. Stable postsurgical changes from left pneumonectomy. There is mucoid material within the left residual mainstem bronchus. RECOMMENDATIONS: Unavailable       CT CHEST W CONTRAST    Result Date: 9/21/2021  EXAMINATION: CT OF THE CHEST WITH CONTRAST 9/21/2021 10:47 am TECHNIQUE: CT of the chest was performed with the administration of intravenous contrast. Multiplanar reformatted images are provided for review. Dose modulation, iterative reconstruction, and/or weight based adjustment of the mA/kV was utilized to reduce the radiation dose to as low as reasonably achievable. COMPARISON: Chest CT 03/08/2021 HISTORY: ORDERING SYSTEM PROVIDED HISTORY: Malignant neoplasm of middle lobe, bronchus or lung [facility]) TECHNOLOGIST PROVIDED HISTORY: Reason for exam:->Lung cancer, current or r/o recurrence; Lung cancer, r/o recurrence;  No known/automatically detected potential contraindications to iodinated contrast Reason for Exam: Recheck Lung Cancer, smoker, asthma, sob, COPD, Emphysema, Surg- Lung, Inj 75cc Isovue 370 followed by saline flush FINDINGS: Mediastinum: No adenopathy. No acute findings. No pericardial effusion. Lungs/pleura: 5 mm right lower lobe pulmonary nodule axial image 131 is present, previously measuring 2 mm at this location. This is part of the previously described 7 mm nodule on the comparison from March 2021. The remainder of the ground-glass nodule is unchanged elsewhere but much increased solid component now measuring 5 mm, previously 2 mm. A separate 5 mm ground-glass nodule is unchanged, axial image 24. Ground-glass nodule in the right lower lobe measuring 3 mm image 126 is unchanged. Unchanged 5 mm ground-glass nodule posterior segment right upper lobe adjacent to the fissure shown on axial image 70.  6 mm nodule with ill-defined somewhat spiculated margins axial image 93 remains present, no significant change in appearance. Emphysema again noted. No pneumothorax. No pleural effusion. No pneumonia or pulmonary edema. Upper Abdomen: No evidence of metastatic disease or acute findings of the upper abdomen. Hemangioma in the left hepatic lobe again noted measuring 1.4 cm. Nonobstructing 6 mm right renal stone noted incidentally as well. Incidental cyst of the superior left kidney measuring 1.8 cm. Soft Tissues/Bones: No adenopathy of the chest wall with normal size lymph nodes. No acute or aggressive osseous findings. Increased size of solid component of a mixed density nodule in the right lower lobe, 5 mm, previously 2 mm, much increased over the past 6 months. Close follow-up is recommended, chest CT in 3 months recommended. Stable tiny nodules elsewhere measuring less than 1 cm.   No new findings elsewhere in the chest \    Ct Chest W Contrast    Result Date: 3/8/2021  EXAMINATION: CT OF THE CHEST WITH CONTRAST 3/8/2021 8:53 am TECHNIQUE: CT of the chest was performed with the administration of intravenous contrast. Multiplanar reformatted images are provided for review. Dose modulation, iterative reconstruction, and/or weight based adjustment of the mA/kV was utilized to reduce the radiation dose to as low as reasonably achievable. COMPARISON: 10/20/2020 HISTORY: ORDERING SYSTEM PROVIDED HISTORY: Malignant neoplasm of upper lobe of right lung [facility]) TECHNOLOGIST PROVIDED HISTORY: Reason for Exam: Recheck Lung Cancer, Surg- Lt lung removed, Inj 75cc Isovue 370 followed by saline flush Subsequent follow-up FINDINGS: Mediastinum: Leftward mediastinal shift. No central or lobar pulmonary embolus in the right lung. Thoracic aorta is normal caliber. Heart size is normal.  No pericardial effusion. No mediastinal or hilar adenopathy. Lungs/pleura: Status post left pneumonectomy with elevated left hemidiaphragm. Small amount of low-attenuation fluid in the left hemithorax, stable from the prior exam. Severe emphysema in the right lung. In the right middle lobe along the right minor fissure, there is a 7 mm pulmonary nodule, previously measuring 8 mm on 09/02/2020. Compared to 10/18/2019, the nodule is increased in size and density, originally ground-glass in 2019. Extension along the minor fissure pleural surface has progressed. These changes are best characterized on the coronal reconstructions. In the lateral basal right lower lobe, there are a couple ground-glass density pulmonary nodules. 1 of the nodules measuring 7 mm has progressed from 10/18/2019. The adjacent subpleural 5 mm ground-glass density nodule is stable from 10/18/2019. In the posterior segment right upper lobe adjacent to the major fissure, there is a stable 5 mm ground-glass density nodule. No pleural effusion or pneumothorax. The central airways are clear. Upper Abdomen: Stable elevation of the left hemidiaphragm. Stable thickening of the left adrenal gland.   Stable 15 mm low-attenuation lesion in the left hepatic lobe. Soft Tissues/Bones: No lytic or blastic osseous lesion. 1. Status post left pneumonectomy. No thoracic adenopathy. 2. Right middle lobe 7 mm nodule abutting the minor fissure. Additional 7 mm subpleural lateral basal right lower lobe ground-glass density nodule. Both nodules have increased in size and density compared to 10/18/2019. Adenocarcinoma spectrum lesion should be considered. 3. Severe emphysema. I personally reviewed relevant radiology images. MEDICAL DECISION MAKING:   I reviewed the Chest CT and compared [id number] to previous exam  Waxing/waning pulmonary nodules  At this point, there is no clinical or radiographic evidence of recurrence  Treated lesion in RML is stable to slightly decreased in size  Will monitor at 3 month interval with Chest CT per radiology recommendations      MEDICAL DECISION MAKING    Number/Complexity of Problems Addressed  [] 1 self-limited of minor problem (49085/92186)  [x] >=2 self-limited or minor problems, 1 stable chronic illness, 1 acute/uncomplicated illness/injury (62292/05749)  []Chronic illnesses with exacerbation/progression/side effects of treatment, >=2 stable chronic illnesses, 1 undiagnosed new problem with uncertain prognosis, 1 acute illness with systemic symptoms, 1 acute complicated injury (66373/27436)  []Chronic illnesses with severe exacerbation/progression/treatment side effects, acute or chronic illness or injury that poses a threat to life or bodily function (30847/39560)    Amount and/or Complexity of Data Reviewed  [] Minimal or none (39113/03886)  [] Limited - meets 1/2 categories (10639/25851)  1. At least 2 of: review of prior external notes from each unique source, review results of each unique test, ordering of each unique test  2. Assessment requiring an independent historian  [] Moderate - meets 1/3 categories (45282/41189)  1.  Any 3 of: Review of prior external notes from each unique source, review results of each unique test, ordering of each unique test, assessment requiring an independent historian  2. Independent interpretation of tests  3. Discussion of management or test interpretation  [x] Extensive - meets 2/3 categories (42221/15827)  1. Any 3 of: Review of prior external notes from each unique source, review results of each unique test, ordering of each unique test, assessment requiring an independent historian  2. Independent interpretation of tests  3.  Discussion of management or test interpretation    Risk of complications and/or morbidity/mortality of patient management  [] Minimal (62777/68830)  [x] Low (65384/15405)  [] Moderate (28603/12899)  Examples: Rx drug management, decision regarding minor surgery with identified patient or procedure risk factors, decision regarding elective major surgery without identified patient or procedure risk factors, diagnosis or treatment significantly limited by social determinants of health  [] High (82712/45524)  Examples: drug therapy requiring intensive monitoring for toxicity, decision regarding elective major surgery with identified patient/procedure risk factors, decision regarding emergency major surgery, decision regarding hospitalization, decision for DNR or de-escalation of care because of poor prognosis    LEVEL OF MDM (based on 2/3 above categories)  [] Straightforward (75237/21352)  [x] Low (96762/32954)  [] Moderate (62793/94008)  [] High (51259/66649)      Electronically signed by Electronically signed by Silvia Paez MD on 12/29/2021 at 9:15 AM

## 2021-12-29 NOTE — PROGRESS NOTES
Zurdo Norwoodted  12/29/2021    Patient is seen today for follow up. Vitals:    12/29/21 0829   BP: 132/87   Pulse: 99   Resp: 16   Temp: 97.5 °F (36.4 °C)   SpO2: 95%        Oxygen Therapy  SpO2: 95 %    Wt Readings from Last 3 Encounters:   12/29/21 90 lb 6.4 oz (41 kg)   09/23/21 91 lb (41.3 kg)   03/25/21 93 lb 6.4 oz (42.4 kg)       Pain Assessment  Pain Assessment: 0-10  Pain Level: 5  Pain Type: Chronic pain  Pain Location: Abdomen  Pain Orientation: Left  OTC Analgesics       Allergies   Allergen Reactions    Erythromycin Other (See Comments)     Was rushed to hospital, heart palpitations      Macrolides And Ketolides     Tape [Adhesive Tape]      blisters    Codeine Nausea And Vomiting    Vicodin [Hydrocodone-Acetaminophen] Nausea And Vomiting        Current Outpatient Medications   Medication Sig Dispense Refill    Naproxen Sodium (ALEVE) 220 MG CAPS Take 1 capsule by mouth every 6 hours as needed for Pain      ipratropium (ATROVENT) 0.02 % nebulizer solution Take 2.5 mLs by nebulization 4 times daily 2.5 mL 5     No current facility-administered medications for this encounter. Additional Comments: pt reports chronic 5/10 pain to left rib area. Pt reports taking OTC aleve PRN. Pt denies any additional concerns at time.     Electronically signed by Gorge Petersen RN on 12/29/2021 at 8:32 AM

## 2022-01-13 ENCOUNTER — TELEPHONE (OUTPATIENT)
Dept: ONCOLOGY | Age: 57
End: 2022-01-13

## 2022-01-13 NOTE — TELEPHONE ENCOUNTER
Called patient regarding her CT scan on 03.29.2022 at [facility] arr 0900. Went over prep and she stated understanding.

## 2022-03-01 NOTE — PROGRESS NOTES
Patient Name: Nahun Roberson  Patient : 1965  Patient MRN: [id number]     Primary Oncologist: Hay Todd MD  Referring Provider: Claudia Long MD     Date of Service: 3/31/2022      Chief Complaint:   Chief Complaint   Patient presents with    Follow-up     She came in for follow-up visit. Patient Active Problem List:     Syncope     Tobacco use disorder     Hyperlipidemia        Malignant neoplasm of upper lobe bronchus, left      Pulmonary nodule     HPI:   Emmanuel Crisostomo is a pleasant 58-year-old female patient with history of lung surgery due to the bullae in , performed by Dr. James Saucedo to prevent lung collapse, and high-grade adenocarcinoma of the lung, status post left upper lobe mass biopsy on 2014. Pathology report showed high-grade carcinoma with abundant necrosis. Immunostain study showed that the tumor cells were positive for CK7 and TTF1 and negative for CK5-6, Napsin A, and B63. The tumor was consistent with high-grade adenocarcinoma of the lung origin. EGFR and ALK rearrangement studies were negative. She went to the emergency room in 2013 with complaint of pain to the ribcage. CT chest without contrast on 2013 showed a 6.2 by 4.8 by 6.9 cm partially calcified large infiltrative mass involving the left upper lobe and multiple small mediastinal and left hilar nodes. Metastatic disease could not be excluded. Indeterminate hypodense lesions within the liver and involving the pancreas were described. Left adrenal gland was mildly hyperplastic. CT abdomen and pelvis on 2013 showed benign appearing left hepatic lobe mass, which was seen in 2007. This was probably an hemangioma. A smaller satellite nodule appeared benign and was probably a cyst.  No compelling evidence for solid organ metastases. There was no evidence of abnormal enhancement in the pancreatic head or neck.   Benign appearing simple cysts in the upper pole of the left kidney and bilateral nephrolithiasis were described. I discussed with her,  therefore, biopsy of the liver was not done. Blood test in December 2013 showed sodium 135, potassium 3.8, bun 17, creatinine 0.7, white cell 5.4, hemoglobin 14.2, platelet 612. CMP was December 24, 2013, showed normal sodium at 140, potassium 4.0, BUN 20, creatinine 0.7, calcium 9.8, alk phos 136, ALT 16, AST 21, albumin 4.1, total protein 7.6, total bilirubin 0.4. Pulmonary function test was okay. She was seen by Dr. Khushi Fink at [facility], who  ordered pretest study on February 7, 2014, including MRI of the brain, echo, and stress test.    PET-CT scan on January 13, 2014 showed:  1. Large left lung neoplasm., 2. Probable left hilar and mediastinal puja metastases. , 3. Asymmetric hypermetabolic activity involving the cecum. CT brain on January 8, 2014 was a normal study. Mediastinoscopy on February 14, 2014 showed: reportedly all nodes sampled were negative for the cancer. She had left lung pneumonectomy with lymph node sampling on February 27, 2014. Pathology report showed solid predominant adenocarcinoma measuring 7.9 by 6.2 by 5.1 cm in the left upper lobe, grade 3. No visceral invasion present. The tumor was confined to the lung parenchyma. The bronchial margin was negative. There was extensive lymphatic/vascular invasion and one in two lymph nodes was negative for malignancy and pathologically she was staged as T3, N0, MX.  ALK study was negative for rearrangements. She was recommended to consider adjuvant chemotherapy. I went over the NCCN Guideline and I put her on cisplatin/Alimta regimen. She received first adjuvant treatment with Alimta/cisplatin on March 19, 2014, and completed the fourth cycle of adjuvant cisplatinum/Alimta on May 21, 2014.     Blood test in May 2014 showed white cell count of 3.7, hemoglobin 13.3, platelet 886, BUN 16, creatinine 0.6, calcium 9.0, albumin 4.5, total protein 7.4, total bilirubin 0.4, alk phos 143, AST 23, ALT 18, magnesium 2.0. I planned to have a CT scan every six months in the first two or three years and yearly afterwards. CT chest in June 2014 showed no evidence of metastatic disease. She passed a kidney stone. Blood tests in December 2014 showed normal CBC. CMP was stable, with alk phos 150 and calcium 9.2. A CT of the chest in December 2014 showed no evidence of metastatic disease and no significant interval change compared to previous study. She had colonoscopy in July 2014 by Dr. Ralph Garcia and reportedly there were no polyps. Blood test in June 2015 showed normal CBC. CMP was stable with alk phos 165. CT chest in June 2015 showed no evidence of progression of the disease. The osseous and soft tissue structures appeared otherwise normal.   CT chest in December 2015 revealed no evidence of metastatic disease or progression of the cancer. Blood test in October 2015 revealed normal CBC and CMP. TSH was 0.88, ferritin 78, B12 281. Iron was 92. Ferritin was 78. CMP was within normal limits. Blood test in July 2016 revealed normal CBC and CMP. CT chest in June 2016 revealed no evidence of progression of the disease. CT chest in April 2018 revealed stable post surgical changes from the left pneumonectomy with stable loculated pleural effusion and no evidence of metastatic disease in the chest. 4 mm ground-glass nodule in the right upper lobe was noted. F/U CT chest in July 2018 revealed : previously visualized ground-glass nodule no longer visualized. No significant change in appearance of the chest otherwise. CXR in April 2019 showed no acute abnormality. She quit smoking in March 2019. CT chest in October 2019 showed :  1. Stable postsurgical changes from left pneumonectomy. 2. New 5 mm subsolid nodule in the right middle lobe adjacent to the minor fissure.  This may have been present on the prior exams but had a more subtle ground-glass appearance on the prior exams. Recommend a short-term follow-up in 3 months versus per clinical protocol. 3. Emphysematous changes. Maternal aunt had recurrent lung cancer. CT chest in January 2020 showed stable 6 mm irregular RML nodule, more solid concerning for malignancy. Too small for PET or biopsy. She went to [address] [place] [address].  CT chest on May 4, 2020 showed slight increased size of the spiculated subpleural right middle lobe nodule, now approximately 10 mm maximal diameter previously 6 mm. Other ill-defined groundglass nodules in the right upper lobe and right lower lobe appeared similar. Suspect simple appearing left renal and left hepatic cyst, nonobstructing right nephrolithiasis. I discussed with Dr. Van Max, thoracic surgeon who is agreeable with PET CT scan. She smokes few cigarettes a day. She decided that she may not consider any chemotherapy, should the disease come back again  CBC was stable. CMP showed increased alkaline phosphatase. PET CT scan in June 2020 and CT chest in September 2020 were reviewed. Due to enlarging nodule I have referred to radiation oncologist for potential stereotactic radiotherapy. She underwent bronchoscopy with navigational bronchoscopy on October 22, 2020. She developed pneumothorax. Final Cytologic Diagnosis:   A.  Right Middle Lobe Lung Brushing cytology with cell block:        POSITIVE FOR MALIGNANCY.     B.  Right Middle Lobe Lavage cytology with cell block:        Nondiagnostic.     C.  Right Middle Lobe Lung Fine Needle Aspirate cytology with cell block:        POSITIVE FOR MALIGNANCY. She received stereotactic radiotherapy for 3 days and completed on November 30, 2020. CT chest on March 8, 2021  1. Status post left pneumonectomy.  No thoracic adenopathy.   2. Right middle lobe 7 mm nodule abutting the minor fissure.  Additional 7 mm subpleural lateral basal right lower lobe ground-glass density nodule.  Both nodules have increased in size and density compared to 10/18/2019. Adenocarcinoma spectrum lesion should be considered. 3. Severe emphysema. I compared to PET scan in June 2020 and she was agreeable to have follow up CT chest in 6 months which is already scheduled by Dr Jenny Brown. Chest CT on September 21, 2021 showed  Increased size of solid component of a mixed density nodule in the right lower lobe, 5 mm, previously 2 mm, much increased over the past 6 months. Close follow-up is recommended, chest CT in 3 months recommended.    Stable tiny nodules elsewhere measuring less than 1 cm.  No new findings elsewhere in the chest  She saw radiation oncologist today who ordered follow-up CT chest in 3 months. She has been using CBD gummy she ordered online. It seemed to help her anxiety and pain. CT chest on December 27, 2021 revealed  1. Slight interval decrease in size of a 4 mm irregular nodule in the right middle lobe previously 6 mm.  There is mild surrounding ground-glass attenuation likely reflecting posttreatment changes. 2. There is a new part solid 6 mm nodule in the anterior inferior right upper lobe as well as a new 4 mm solid nodule just anterior to the right minor fissure.  Subtle slight interval increase in size of a 6 mm solid nodule in the right lower lobe laterally.  Otherwise stable subcentimeter pulmonary  nodules throughout the right lung. 3. Stable postsurgical changes from left pneumonectomy.  There is mucoid material within the left residual mainstem bronchus. Labs in December 2021 showed slight increase hemoglobin at 16.1 and alk phos 158. Alk phos has been fluctuating since 2013. She smokes 5 cigarettes a day. On 3/31/2022 she came in for follow up visit. Reviewed with her the CT chest in February 2022 as below. 1. Postsurgical changes of left pneumonectomy.  Grossly unchanged left  loculated pleural fluid.   2. Previously measured part solid nodule in the anterior inferior right upper  lobe is no longer appreciated.  Otherwise, previously measured pulmonary  nodules are grossly stable. She complains of right neck nodule. She is scheduled for the CT neck. She fell recently. MRI of brain has been ordered. I recommend she call for the test result. She is agreeable to have follow-up CT chest in 6 months. Again we discussed about smoking cessation. CBC and CMP in March 2022 was stable for her. She has chronic arthritic pain. She has chronic pain at surgical site. No nausea, vomiting or diarrhea. No fever or chills. No chest pain, shortness of breath or palpitation. No headache or dizzy spell. No melena or hematochezia. Denied any dysuria or hematuria. PAST MEDICAL HISTORY:  Lung disease, status post lung surgery in 2004, history of bilateral mastectomy due to the frequent lumpectomies in 2005 by Dr. Josue Khanna,  cholecystectomy in [facility], [facility] in 2012. Dyslipidemia. FAMILY HISTORY:  Father had lung cancer and he was a smoker. Mother had throat cancer and colon cancer in her seventies. Maternal grandfather had colon cancer in his eighties. One maternal aunt had lung cancer. She stated one cousin was diagnosed with pancreatic cancer. SOCIAL HISTORY:  She smoked about one pack per day for 35 years and is using electronic cigarettes. She drinks alcohol occasionally. She is  and has one child. She has been retired since 2014    LABORATORY STUDIES:   January 6, 2014: White cell count 9.2, hemoglobin 13.4, platelet 076, BUN 17, creatinine 0.6, AST 20, ALT 11, calcium 9.6, alk phos 113. Review of Systems: \"Per interval history; otherwise 10 point ROS is negative. \"     Vital Signs:  /78 (Site: Left Upper Arm, Position: Sitting, Cuff Size: Medium Adult)   Pulse 99   Temp 96.6 °F (35.9 °C) (Temporal)   Resp 18   Ht 5' (1.524 m)   Wt 91 lb 3.2 oz (41.4 kg)   LMP 01/12/2010   SpO2 97%   BMI 17.81 kg/m²     Physical Exam:  CONSTITUTIONAL: awake, alert, cooperative, no apparent distress   EYES: pupils equal, round and reactive to light, sclera clear and conjunctiva normal  ENT: Normocephalic, without obvious abnormality, atraumatic  NECK: supple, symmetrical, no jugular venous distension and no carotid bruits   HEMATOLOGIC/LYMPHATIC: no cervical, supraclavicular or axillary lymphadenopathy   LUNGS: Clear to auscultation and percussion bilaterally. BREAST: Status post bilateral mastectomy. CARDIOVASCULAR: regular rate and rhythm, normal S1 and S2, no murmur  ABDOMEN: normal bowel sound, soft, non-distended, non-tender, no masses palpated, no hepatosplenomegaly   MUSCULOSKELETAL: full range of motion noted, tone is normal  NEUROLOGIC: Motor is grossly intact. CN II - XII grossly intact. SKIN: Normal skin color, texture, turgor and no jaundice. appears intact   EXTREMITIES: no LE edema or cyanosis.      Labs:  Hematology:  Lab Results   Component Value Date    WBC 5.8 03/24/2022    RBC 5.12 03/24/2022    HGB 15.7 03/24/2022    HCT 47.4 (H) 03/24/2022    MCV 92.6 03/24/2022    MCH 30.7 03/24/2022    MCHC 33.1 03/24/2022    RDW 14.7 03/24/2022     03/24/2022    MPV 10.2 03/24/2022    SEGSPCT 54.4 03/24/2022    EOSRELPCT 1.4 03/24/2022    BASOPCT 0.3 03/24/2022    LYMPHOPCT 35.8 03/24/2022    MONOPCT 8.1 (H) 03/24/2022    SEGSABS 3.2 03/24/2022    EOSABS 0.1 03/24/2022    BASOSABS 0.0 03/24/2022    LYMPHSABS 2.1 03/24/2022    MONOSABS 0.5 03/24/2022    DIFFTYPE AUTOMATED DIFFERENTIAL 03/24/2022     Lab Results   Component Value Date    ESR 20 05/16/2012     Chemistry:  Lab Results   Component Value Date     03/24/2022    K 4.4 03/24/2022     03/24/2022    CO2 22 03/24/2022    BUN 21 03/24/2022    CREATININE 0.8 03/24/2022    GLUCOSE 146 (H) 03/24/2022    CALCIUM 10.0 03/24/2022    PROT 7.4 03/24/2022    LABALBU 4.9 03/24/2022    BILITOT 0.5 03/24/2022    ALKPHOS 156 (H) 03/24/2022    AST 21 03/24/2022    ALT 24 03/24/2022    LABGLOM >60 03/24/2022 GFRAA >60 03/24/2022    MG 2.1 10/21/2020     Lab Results   Component Value Date    TSHHS 0.806 05/16/2012     Immunology:  Lab Results   Component Value Date    PROT 7.4 03/24/2022     Coagulation Panel:  Lab Results   Component Value Date    PROTIME 10.8 (L) 10/12/2020    INR 0.89 10/12/2020    APTT 28.7 10/12/2020      Assessment & Plan:  1. She had stage II adenocarcinoma of the lung. EGFR and ALK study was negative. She had surgery of the lung and was placed on adjuvant chemotherapy, cisplatin and Alimta. She completed adjuvant chemotherapy on May 21, 2014. CBC in June 2014 was within normal limits. CMP was stable for her with alk phos 157. CT chest in June 2016 was negative for recurrent disease. CBC and CMP in July 2016 were within normal limits. CT chest in April 2017 revealed no evidence of progression of the disease. CT chest in April 2018 revealed stable findings with 4 mm ground-glass nodule to the right upper lung. Follow-up CT chest in July 2018 was reviewed. CXR in April 2019 was non acute. CT chest in October 2019 showed stable findings with new 5 mm sub-solid nodule in the right middle lobe. CT chest in January 2020 was reviewed. CT chest in May 2020 showed enlarging right middle lung nodule. PET scan in June 2020 and CT chest in September 2020 were reviewed. Since the nodule continues to get larger. I referred to see radiation oncologist for potential stereotactic radiotherapy. She has bronchoscopy in October 2021 and pathology report was positive for NSCLC. She had stereotactic radiotherapy to [facility] until November 30, 2020. CT  Chest in September 2021 was reviewed. CT chest in December 2021 was reviewed. Labs were reviewed. CT chest in March 2022 was reviewed. I believe she is in remission. She is agreeable to a follow-up CT chest in 6 months. We discussed about smoking cessation. 2.  I advised her to keep her other age appropriate cancer screening up-to-date.  She had bilateral mastectomy, and colonoscopy in 2014. She will discuss with family doctor about her follow-up colonoscopy. 3.  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 We again discussed about smoking cessation. Check labs today. She has been taking CBD gummy which she ordered online. She is scheduled for the CT neck and MRI of the brain within 2 weeks. Advised to call for the test result. We discussed about healthy diet and lifestyle. She had covid vaccine. RTC in 6 months or sooner. All of her questions have been answered for today. Recent imaging and labs were reviewed and discussed with the patient.

## 2022-03-24 ENCOUNTER — HOSPITAL ENCOUNTER (OUTPATIENT)
Dept: INFUSION THERAPY | Age: 57
Discharge: HOME | End: 2022-03-24
Payer: MEDICARE

## 2022-03-24 DIAGNOSIS — D75.1 ERYTHROCYTOSIS: ICD-10-CM

## 2022-03-24 DIAGNOSIS — C34.2 MALIGNANT NEOPLASM OF MIDDLE LOBE, BRONCHUS OR LUNG (HCC): ICD-10-CM

## 2022-03-24 LAB
ALBUMIN SERPL-MCNC: 4.9 GM/DL (ref 3.4–5)
ALP BLD-CCNC: 156 IU/L (ref 40–128)
ALT SERPL-CCNC: 24 U/L (ref 10–40)
ANION GAP SERPL CALCULATED.3IONS-SCNC: 15 MMOL/L (ref 4–16)
AST SERPL-CCNC: 21 IU/L (ref 15–37)
BASOPHILS ABSOLUTE: 0 K/CU MM
BASOPHILS RELATIVE PERCENT: 0.3 % (ref 0–1)
BILIRUB SERPL-MCNC: 0.5 MG/DL (ref 0–1)
BUN BLDV-MCNC: 21 MG/DL (ref 6–23)
CALCIUM SERPL-MCNC: 10 MG/DL (ref 8.3–10.6)
CHLORIDE BLD-SCNC: 102 MMOL/L (ref 99–110)
CO2: 22 MMOL/L (ref 21–32)
CREAT SERPL-MCNC: 0.8 MG/DL (ref 0.6–1.1)
DIFFERENTIAL TYPE: ABNORMAL
EOSINOPHILS ABSOLUTE: 0.1 K/CU MM
EOSINOPHILS RELATIVE PERCENT: 1.4 % (ref 0–3)
GFR AFRICAN AMERICAN: >60 ML/MIN/1.73M2
GFR NON-AFRICAN AMERICAN: >60 ML/MIN/1.73M2
GLUCOSE BLD-MCNC: 146 MG/DL (ref 70–99)
HCT VFR BLD CALC: 47.4 % (ref 37–47)
HEMOGLOBIN: 15.7 GM/DL (ref 12.5–16)
LYMPHOCYTES ABSOLUTE: 2.1 K/CU MM
LYMPHOCYTES RELATIVE PERCENT: 35.8 % (ref 24–44)
MCH RBC QN AUTO: 30.7 PG (ref 27–31)
MCHC RBC AUTO-ENTMCNC: 33.1 % (ref 32–36)
MCV RBC AUTO: 92.6 FL (ref 78–100)
MONOCYTES ABSOLUTE: 0.5 K/CU MM
MONOCYTES RELATIVE PERCENT: 8.1 % (ref 0–4)
PDW BLD-RTO: 14.7 % (ref 11.7–14.9)
PLATELET # BLD: 228 K/CU MM (ref 140–440)
PMV BLD AUTO: 10.2 FL (ref 7.5–11.1)
POTASSIUM SERPL-SCNC: 4.4 MMOL/L (ref 3.5–5.1)
RBC # BLD: 5.12 M/CU MM (ref 4.2–5.4)
SEGMENTED NEUTROPHILS ABSOLUTE COUNT: 3.2 K/CU MM
SEGMENTED NEUTROPHILS RELATIVE PERCENT: 54.4 % (ref 36–66)
SODIUM BLD-SCNC: 139 MMOL/L (ref 135–145)
TOTAL PROTEIN: 7.4 GM/DL (ref 6.4–8.2)
WBC # BLD: 5.8 K/CU MM (ref 4–10.5)

## 2022-03-24 PROCEDURE — 36415 COLL VENOUS BLD VENIPUNCTURE: CPT

## 2022-03-24 PROCEDURE — 85025 COMPLETE CBC W/AUTO DIFF WBC: CPT

## 2022-03-24 PROCEDURE — 80053 COMPREHEN METABOLIC PANEL: CPT

## 2022-03-29 ENCOUNTER — HOSPITAL ENCOUNTER (OUTPATIENT)
Dept: CT IMAGING | Age: 57
Discharge: HOME | End: 2022-03-29
Payer: MEDICARE

## 2022-03-29 DIAGNOSIS — R91.8 LUNG NODULES: ICD-10-CM

## 2022-03-29 PROCEDURE — 6360000004 HC RX CONTRAST MEDICATION: Performed by: INTERNAL MEDICINE

## 2022-03-29 PROCEDURE — 71260 CT THORAX DX C+: CPT

## 2022-03-29 RX ORDER — SODIUM CHLORIDE 0.9 % (FLUSH) 0.9 %
5-40 SYRINGE (ML) INJECTION 2 TIMES DAILY
Status: DISCONTINUED | OUTPATIENT
Start: 2022-03-29 | End: 2022-03-30 | Stop reason: HOSPADM

## 2022-03-29 RX ADMIN — IOPAMIDOL 75 ML: 755 INJECTION, SOLUTION INTRAVENOUS at 09:15

## 2022-03-31 ENCOUNTER — OFFICE VISIT (OUTPATIENT)
Dept: ONCOLOGY | Age: 57
End: 2022-03-31
Payer: MEDICARE

## 2022-03-31 ENCOUNTER — HOSPITAL ENCOUNTER (OUTPATIENT)
Dept: RADIATION ONCOLOGY | Age: 57
Discharge: HOME | End: 2022-03-31
Attending: RADIOLOGY
Payer: MEDICARE

## 2022-03-31 ENCOUNTER — HOSPITAL ENCOUNTER (OUTPATIENT)
Dept: INFUSION THERAPY | Age: 57
Discharge: HOME | End: 2022-03-31

## 2022-03-31 VITALS
DIASTOLIC BLOOD PRESSURE: 78 MMHG | RESPIRATION RATE: 18 BRPM | WEIGHT: 91.2 LBS | TEMPERATURE: 96.6 F | BODY MASS INDEX: 17.91 KG/M2 | HEIGHT: 60 IN | HEART RATE: 99 BPM | OXYGEN SATURATION: 97 % | SYSTOLIC BLOOD PRESSURE: 132 MMHG

## 2022-03-31 DIAGNOSIS — C34.11 MALIGNANT NEOPLASM OF UPPER LOBE OF RIGHT LUNG (HCC): Primary | ICD-10-CM

## 2022-03-31 DIAGNOSIS — R22.1 MASS OF RIGHT SIDE OF NECK: ICD-10-CM

## 2022-03-31 DIAGNOSIS — C34.90 MALIGNANT NEOPLASM OF LUNG, UNSPECIFIED LATERALITY, UNSPECIFIED PART OF LUNG (HCC): Primary | ICD-10-CM

## 2022-03-31 PROCEDURE — G8419 CALC BMI OUT NRM PARAM NOF/U: HCPCS | Performed by: INTERNAL MEDICINE

## 2022-03-31 PROCEDURE — 4004F PT TOBACCO SCREEN RCVD TLK: CPT | Performed by: INTERNAL MEDICINE

## 2022-03-31 PROCEDURE — G8484 FLU IMMUNIZE NO ADMIN: HCPCS | Performed by: INTERNAL MEDICINE

## 2022-03-31 PROCEDURE — 99214 OFFICE O/P EST MOD 30 MIN: CPT | Performed by: RADIOLOGY

## 2022-03-31 PROCEDURE — 99214 OFFICE O/P EST MOD 30 MIN: CPT | Performed by: INTERNAL MEDICINE

## 2022-03-31 PROCEDURE — G8427 DOCREV CUR MEDS BY ELIG CLIN: HCPCS | Performed by: INTERNAL MEDICINE

## 2022-03-31 PROCEDURE — 3017F COLORECTAL CA SCREEN DOC REV: CPT | Performed by: INTERNAL MEDICINE

## 2022-03-31 ASSESSMENT — PATIENT HEALTH QUESTIONNAIRE - PHQ9
SUM OF ALL RESPONSES TO PHQ QUESTIONS 1-9: 0
SUM OF ALL RESPONSES TO PHQ9 QUESTIONS 1 & 2: 0
2. FEELING DOWN, DEPRESSED OR HOPELESS: 0
SUM OF ALL RESPONSES TO PHQ QUESTIONS 1-9: 0
1. LITTLE INTEREST OR PLEASURE IN DOING THINGS: 0

## 2022-03-31 NOTE — PROGRESS NOTES
Burton Gomez  3/31/2022    Patient is seen today for follow up. There were no vitals filed for this visit. Wt Readings from Last 3 Encounters:   12/29/21 90 lb 6.4 oz (41 kg)   12/29/21 90 lb 6.4 oz (41 kg)   09/23/21 91 lb (41.3 kg)          Denies Need for Intervention       Allergies   Allergen Reactions    Erythromycin Other (See Comments)     Was rushed to hospital, heart palpitations      Macrolides And Ketolides     Tape [Adhesive Tape]      blisters    Codeine Nausea And Vomiting    Vicodin [Hydrocodone-Acetaminophen] Nausea And Vomiting        Current Outpatient Medications   Medication Sig Dispense Refill    Naproxen Sodium (ALEVE) 220 MG CAPS Take 1 capsule by mouth every 6 hours as needed for Pain      ipratropium (ATROVENT) 0.02 % nebulizer solution Take 2.5 mLs by nebulization 4 times daily 2.5 mL 5     No current facility-administered medications for this encounter. Additional Comments: Pt here for follow up with RAD, states she just has constant pain in her neck.     Electronically signed by Solis Hughes CMA on 3/31/2022 at 9:01 AM

## 2022-03-31 NOTE — PROGRESS NOTES
Pt scheduled for CT Neck and MRI Brain with Contrast at [facility] on April 15, 2022 at 8:00 with 7:30 arrival. Pt instructed to be NPO x 4 hours prior and wear nothing metal to scans. Written instructions/appt reminder given as well, pt voices understanding.

## 2022-03-31 NOTE — PROGRESS NOTES
[id number] [id number] [facility], [address] [facility]  Phone: [phone]  Fax: [phone]    RADIATION ONCOLOGY FOLLOW UP REPORT    PATIENT NAME:  Tiffanie High              : 1965  MEDICAL RECORD NO: [id number]    [facility] NO: [id number]        PROVIDER: Dmitry Kumari MD      DATE OF SERVICE: 3/31/2022     FOLLOW UP PHYSICIANS:   Primary Care: Jim Hannon MD       DIAGNOSIS:  Visit Diagnoses       Codes    Malignant neoplasm of upper lobe of right lung ([address]    -  Primary C34.11       Cancer Staging  Malignant neoplasm of middle lobe, bronchus or lung ([address]  Staging form: Lung, AJCC 8th Edition  - Clinical: Stage IA1 (cT1a, cN0, cM0) - Unsigned    Malignant neoplasm of upper lobe bronchus, left (HCC)  Staging form: Lung, AJCC 7th Edition  - Pathologic stage from 3/4/2014: Stage IIB (T3, N0, cM0) - Unsigned       TREATMENT COURSE:   Oncology History   Malignant neoplasm of upper lobe bronchus, left ([address]   2013 Initial Diagnosis    Malignant neoplasm of upper lobe bronchus, left ([address]     3/2014 Surgery    Left Pneumonectomy + MLND     3/16/2014 -  Chemotherapy    Carboplatin/Alimta      Radiation    The patient saw No care team member to display for radiation treatment. This is the current list of radiation treatment:  Radiation Treatments     No radiation treatments to show.  (Treatments may have been administered in another system.)             Malignant neoplasm of middle lobe, bronchus or lung ([address]   6/15/2020 Initial Diagnosis    Malignant neoplasm of middle lobe, bronchus or lung ([address]     10/20/2020 Biopsy    Biopsy confirming NSCLC in RML     2020 - 2020 Radiation    SBRT to RML    18 Gy x 3 = 54 Gy  6MV photons with no flattening filter prescribed to periphery of tumor  SBRT (VMAT) using 2 arcs         CURRENT THERAPY:  Surveillance      INTERVAL HISTORY:   Denies any new respiratory symptoms including chest pain, cough, hemoptysis, voice changes,worsened dyspnea. Chest CT for surveillance showed no evidence of recurrence  No HA,nausea,vomiting, bone pains, abd pain, changes in bowels/bladder,weight loss    She endorses h/o fall 2 days ago after waking up at night.   Denies weakness but does have paresthesias in RLE and sensory deficit  No seizures  No visual changes    Also noted a new mass in right neck  Non-tender  Unsure of time of onset  Denies dysphagia/odynophagia, no trismus or voice changes      Past Medical History:   Diagnosis Date    Acid reflux     Anemia     Asthma     [facility]erebral artery occlusion with [facility] infarction [facility]     COPD (chronic obstructive pulmonary disease) (HCC)     \"use to see Dr Zeferino Medley but do not care for him\"    Full dentures     Gestational diabetes     \"had gestational diabetess 1984\"\"no trouble with sugar since then\"    History of external beam radiation therapy 12/2020    RML SBRT- 5,400 cGy [facility]  [facility]    History of kidney stones     \"passed a kidney stone approx 2016\"\"they said I had 3 stones and know for sure passed one\"    Hyperlipidemia     Lung cancer ([facility]     \"had left lung cancer - removed the lung- 2013= tx with chemo treatments- follow with Dr Clark Clemente"    Lung nodule     \"one spot on right lung- having bronchoscopy\"( 10/16/2020)    Pneumonia 2018    emphysema, COPD    Pneumothorax     TIA (transient ischemic attack)     \"had TIA in 2012- passed out had weakness right side,affected my memory- took approx 6 months for everything to come back\"        Past Surgical History:   Procedure Laterality Date    BREAST SURGERY  2012    double mastectomy (fibroid) after several lumpectomies    BRONCHOSCOPY N/A 10/20/2020    BRONCHOSCOPY ISAAC performed by Don Tejeda MD at [address]  07/2014    CT GUIDED CHEST TUBE  10/20/2020    CT GUIDED CHEST TUBE 10/20/2020 [facility] CT SCAN    HERNIA REPAIR  2010    ventral hernia repair    LUNG BIOPSY Left 01/02/2014    Left upper lobe mass    LUNG SURGERY Left 02/27/2014    Left peunomectomy with lymph node sampling    LUNG SURGERY  2001    MEDIASTINOSCOPY  02/14/2014    bx lymph nodes    TONSILLECTOMY  as a kid       Family History   Problem Relation Age of Onset    Cancer Father         lung cancer    Heart Disease Father     Substance Abuse Father     Heart Attack Father     Depression Paternal Aunt     Arthritis Maternal Grandfather     Diabetes Maternal Grandfather     High Blood Pressure Maternal Grandfather     Vision Loss Maternal Grandfather     Cancer Paternal Grandfather     High Cholesterol Mother     Cancer Mother         throat cancer    Heart Disease Mother     Miscarriages / [place] Sister        Social History     Socioeconomic History    Marital status:      Spouse name: Not on file    Number of children: Not on file    Years of education: Not on file    Highest education level: Not on file   Occupational History    Not on file   Tobacco Use    Smoking status: Current Every Day Smoker     Packs/day: 0.25     Years: 43.00     Pack years: 10.75     Types: Cigarettes     Start date: 9/9/1977    Smokeless tobacco: Never Used    Tobacco comment: \"the most every smoke was a pack per day \"   Vaping Use    Vaping Use: Never used   Substance and Sexual Activity    Alcohol use: Yes     Comment: 2 drinks a month    Drug use: Yes     Types: Marijuana (Weed)     Comment: daily- does not have medical marijuana card\"age 20's use to do coke- last did age 17\"    Sexual activity: Yes     Partners: Male   Other Topics Concern    Not on file   Social History Narrative    Not on file     Social Determinants of Health     Financial Resource Strain:     Difficulty of Paying Living Expenses: Not on file   Food Insecurity:     Worried About Running Out of Food in the Last Year: Not on file    Fabi of Food in the Last Year: Not on file 5.12 03/24/2022    HGB 15.7 03/24/2022    HCT 47.4 03/24/2022    MCV 92.6 03/24/2022    MCH 30.7 03/24/2022    MCHC 33.1 03/24/2022    RDW 14.7 03/24/2022     03/24/2022    MPV 10.2 03/24/2022     CMP:  Lab Results   Component Value Date     03/24/2022    K 4.4 03/24/2022     03/24/2022    CO2 22 03/24/2022    BUN 21 03/24/2022    CREATININE 0.8 03/24/2022    GFRAA >60 03/24/2022    LABGLOM >60 03/24/2022    GLUCOSE 146 03/24/2022    PROT 7.4 03/24/2022    LABALBU 4.9 03/24/2022    CALCIUM 10.0 03/24/2022    BILITOT 0.5 03/24/2022    ALKPHOS 156 03/24/2022    AST 21 03/24/2022    ALT 24 03/24/2022         RADIOLOGIC STUDIES:    CT CHEST W CONTRAST    Result Date: 3/30/2022  EXAMINATION: CT OF THE CHEST WITH CONTRAST 3/29/2022 9:04 am TECHNIQUE: CT of the chest was performed with the administration of intravenous contrast. Multiplanar reformatted images are provided for review. Dose modulation, iterative reconstruction, and/or weight based adjustment of the mA/kV was utilized to reduce the radiation dose to as low as reasonably achievable. COMPARISON: CT chest performed December 27, 2021. HISTORY: ORDERING SYSTEM PROVIDED HISTORY: Lung nodules TECHNOLOGIST PROVIDED HISTORY: Reason for Exam: Lung nodules Additional signs and symptoms: Patient states history of lung cancer with LT lung removed;  SOB; known emphysema, COPD and asthma,  tobacco use Relevant Medical/Surgical History: Patient states history of lung cancer with LT lung removed;  SOB; known emphysema, COPD and asthma,  tobacco use FINDINGS: Mediastinum: The esophagus is unremarkable. No significant mediastinal or hilar lymphadenopathy. No pericardial effusion. The thoracic aorta is normal in course and caliber with scattered atherosclerotic disease. Lungs/pleura: Status post left pneumonectomy. Mucoid debris again noted within the residual left mainstem bronchus. Grossly stable small volume left loculated pleural fluid.   Previously measured right middle lobe nodule is stable again measuring up to 4 mm (image 71) (series 3, image 69). Previously measured part solid nodule in the anterior inferior right upper lobe is no longer appreciated. Previously measured 4 mm nodule along the anterior minor fissure is stable again measuring up to 4 mm (image 69). Grossly unchanged nodule in the lateral right lower lobe measuring up to 5 mm, previously 6 mm. Moderate emphysematous changes. No pleural effusion or pneumothorax. Upper Abdomen: No acute abnormality of the visualized upper abdomen. Stable thickening of the left adrenal gland. Stable left hepatic lobe hemangioma. Soft Tissues/Bones: No acute soft tissue or osseous abnormality. 1. Postsurgical changes of left pneumonectomy. Grossly unchanged left loculated pleural fluid. 2. Previously measured part solid nodule in the anterior inferior right upper lobe is no longer appreciated. Otherwise, previously measured pulmonary nodules are grossly stable. I personally reviewed relevant radiology images. MEDICAL DECISION MAKIN. Lung Ca  -I reviewed the Chest CT and it is a stable exam  -Stable pulmonary nodules  -At this point, there is no clinical or radiographic evidence of recurrence  -Treated lesion in RML is stable to slightly decreased in size  -Repeat scan in 6 months    2. Fall  - Brain MRI to r/o brain mets as etiology  - f/u with PCP for prior h/o TIA    3.  Right neck mass  -unclear etiology  -CT Neck/Soft Tissues for further workup      MEDICAL DECISION MAKING    Number/Complexity of Problems Addressed  [] 1 self-limited of minor problem (55177/43795)  [] >=2 self-limited or minor problems, 1 stable chronic illness, 1 acute/uncomplicated illness/injury (93358/95637)  [x]Chronic illnesses with exacerbation/progression/side effects of treatment, >=2 stable chronic illnesses, 1 undiagnosed new problem with uncertain prognosis, 1 acute illness with systemic symptoms, 1 acute complicated injury (96700/26690)  []Chronic illnesses with severe exacerbation/progression/treatment side effects, acute or chronic illness or injury that poses a threat to life or bodily function (06891/58883)    Amount and/or Complexity of Data Reviewed  [] Minimal or none (03405/59726)  [] Limited - meets 1/2 categories (82016/64707)  1. At least 2 of: review of prior external notes from each unique source, review results of each unique test, ordering of each unique test  2. Assessment requiring an independent historian  [] Moderate - meets 1/3 categories (88661/21481)  1. Any 3 of: Review of prior external notes from each unique source, review results of each unique test, ordering of each unique test, assessment requiring an independent historian  2. Independent interpretation of tests  3. Discussion of management or test interpretation  [x] Extensive - meets 2/3 categories (95893/49108)  1. Any 3 of: Review of prior external notes from each unique source, review results of each unique test, ordering of each unique test, assessment requiring an independent historian  2. Independent interpretation of tests  3.  Discussion of management or test interpretation    Risk of complications and/or morbidity/mortality of patient management  [] Minimal (74920/16304)  [] Low (44480/22449)  [x] Moderate (52560/71917)  Examples: Rx drug management, decision regarding minor surgery with identified patient or procedure risk factors, decision regarding elective major surgery without identified patient or procedure risk factors, diagnosis or treatment significantly limited by social determinants of health  [] High (69236/62002)  Examples: drug therapy requiring intensive monitoring for toxicity, decision regarding elective major surgery with identified patient/procedure risk factors, decision regarding emergency major surgery, decision regarding hospitalization, decision for DNR or de-escalation of care because of poor prognosis    LEVEL OF MDM (based on 2/3 above categories)  [] Straightforward (11315/50404)  [] Low (42572/04273)  [x] Moderate (22469/07833)  [] High (26256/12420)      Electronically signed by Electronically signed by Magda Farias MD on 3/31/2022 at 9:05 AM

## 2022-03-31 NOTE — PROGRESS NOTES
MA Rooming Questions  Patient: Mare Garza  MRN: [id number]    Date: 3/31/2022        1. Do you have any new issues? yes - constant pain in neck         2. Do you need any refills on medications?    no    3. Have you had any imaging done since your last visit? yes - CT    4. Have you been hospitalized or seen in the emergency room since your last visit here?   no    5. Did the patient have a depression screening completed today?  Yes    PHQ-9 Total Score: 0 (3/31/2022  8:57 AM)       PHQ-9 Given to (if applicable):               PHQ-9 Score (if applicable):                     [] Positive     []  Negative              Does question #9 need addressed (if applicable)                     [] Yes    []  No               Garrick Vasquze CMA

## 2022-04-07 ENCOUNTER — HOSPITAL ENCOUNTER (OUTPATIENT)
Age: 57
Discharge: HOME | End: 2022-04-07
Payer: MEDICARE

## 2022-04-07 ENCOUNTER — HOSPITAL ENCOUNTER (OUTPATIENT)
Dept: GENERAL RADIOLOGY | Age: 57
Discharge: HOME | End: 2022-04-07
Payer: MEDICARE

## 2022-04-07 DIAGNOSIS — M54.2 CERVICALGIA: ICD-10-CM

## 2022-04-07 PROCEDURE — 72040 X-RAY EXAM NECK SPINE 2-3 VW: CPT

## 2022-04-15 ENCOUNTER — HOSPITAL ENCOUNTER (OUTPATIENT)
Dept: MRI IMAGING | Age: 57
Discharge: HOME | End: 2022-04-15
Payer: MEDICARE

## 2022-04-15 ENCOUNTER — HOSPITAL ENCOUNTER (OUTPATIENT)
Dept: CT IMAGING | Age: 57
Discharge: HOME | End: 2022-04-15
Payer: MEDICARE

## 2022-04-15 DIAGNOSIS — C34.11 MALIGNANT NEOPLASM OF UPPER LOBE OF RIGHT LUNG (HCC): ICD-10-CM

## 2022-04-15 DIAGNOSIS — R22.1 MASS OF RIGHT SIDE OF NECK: ICD-10-CM

## 2022-04-15 PROCEDURE — 70553 MRI BRAIN STEM W/O & W/DYE: CPT

## 2022-04-15 PROCEDURE — 6360000004 HC RX CONTRAST MEDICATION: Performed by: RADIOLOGY

## 2022-04-15 PROCEDURE — 2580000003 HC RX 258: Performed by: RADIOLOGY

## 2022-04-15 PROCEDURE — 70491 CT SOFT TISSUE NECK W/DYE: CPT

## 2022-04-15 PROCEDURE — A9579 GAD-BASE MR CONTRAST NOS,1ML: HCPCS | Performed by: RADIOLOGY

## 2022-04-15 RX ORDER — SODIUM CHLORIDE 0.9 % (FLUSH) 0.9 %
10 SYRINGE (ML) INJECTION PRN
Status: DISCONTINUED | OUTPATIENT
Start: 2022-04-15 | End: 2022-04-16 | Stop reason: HOSPADM

## 2022-04-15 RX ADMIN — IOPAMIDOL 75 ML: 755 INJECTION, SOLUTION INTRAVENOUS at 08:32

## 2022-04-15 RX ADMIN — GADOTERIDOL 10 ML: 279.3 INJECTION, SOLUTION INTRAVENOUS at 08:52

## 2022-04-15 RX ADMIN — SODIUM CHLORIDE, PRESERVATIVE FREE 10 ML: 5 INJECTION INTRAVENOUS at 08:30

## 2022-08-12 ENCOUNTER — TELEPHONE (OUTPATIENT)
Dept: RADIATION ONCOLOGY | Age: 57
End: 2022-08-12

## 2022-08-12 NOTE — TELEPHONE ENCOUNTER
I called patient to reschedule Follow Up for Dr. Maciel Mtz with another physician. Patient cancelled 10/12/22 appt. Patient is seeing Dr. Garland Richards in September. She will decide after that appointment if she wants to reschedule or not.

## 2022-09-22 ENCOUNTER — HOSPITAL ENCOUNTER (OUTPATIENT)
Dept: CT IMAGING | Age: 57
Discharge: HOME | End: 2022-09-22
Payer: MEDICARE

## 2022-09-22 DIAGNOSIS — C34.90 MALIGNANT NEOPLASM OF LUNG, UNSPECIFIED LATERALITY, UNSPECIFIED PART OF LUNG (HCC): ICD-10-CM

## 2022-09-22 PROCEDURE — 2580000003 HC RX 258: Performed by: INTERNAL MEDICINE

## 2022-09-22 PROCEDURE — 71260 CT THORAX DX C+: CPT

## 2022-09-22 PROCEDURE — 6360000004 HC RX CONTRAST MEDICATION: Performed by: INTERNAL MEDICINE

## 2022-09-22 RX ORDER — SODIUM CHLORIDE 0.9 % (FLUSH) 0.9 %
10 SYRINGE (ML) INJECTION PRN
Status: DISCONTINUED | OUTPATIENT
Start: 2022-09-22 | End: 2022-09-23 | Stop reason: HOSPADM

## 2022-09-22 RX ADMIN — IOPAMIDOL 75 ML: 755 INJECTION, SOLUTION INTRAVENOUS at 10:39

## 2022-09-22 RX ADMIN — SODIUM CHLORIDE, PRESERVATIVE FREE 10 ML: 5 INJECTION INTRAVENOUS at 10:37

## 2022-09-23 ENCOUNTER — HOSPITAL ENCOUNTER (OUTPATIENT)
Dept: INFUSION THERAPY | Age: 57
Discharge: HOME | End: 2022-09-23
Payer: MEDICARE

## 2022-09-23 DIAGNOSIS — C34.90 MALIGNANT NEOPLASM OF LUNG, UNSPECIFIED LATERALITY, UNSPECIFIED PART OF LUNG (HCC): ICD-10-CM

## 2022-09-23 LAB
ALBUMIN SERPL-MCNC: 4.8 GM/DL (ref 3.4–5)
ALP BLD-CCNC: 139 IU/L (ref 40–129)
ALT SERPL-CCNC: 27 U/L (ref 10–40)
ANION GAP SERPL CALCULATED.3IONS-SCNC: 19 MMOL/L (ref 4–16)
AST SERPL-CCNC: 24 IU/L (ref 15–37)
BASOPHILS ABSOLUTE: 0 K/CU MM
BASOPHILS RELATIVE PERCENT: 0.3 % (ref 0–1)
BILIRUB SERPL-MCNC: 0.3 MG/DL (ref 0–1)
BUN BLDV-MCNC: 13 MG/DL (ref 6–23)
CALCIUM SERPL-MCNC: 10.1 MG/DL (ref 8.3–10.6)
CHLORIDE BLD-SCNC: 101 MMOL/L (ref 99–110)
CO2: 19 MMOL/L (ref 21–32)
CREAT SERPL-MCNC: 0.6 MG/DL (ref 0.6–1.1)
DIFFERENTIAL TYPE: ABNORMAL
EOSINOPHILS ABSOLUTE: 0.1 K/CU MM
EOSINOPHILS RELATIVE PERCENT: 1 % (ref 0–3)
GFR AFRICAN AMERICAN: >60 ML/MIN/1.73M2
GFR NON-AFRICAN AMERICAN: >60 ML/MIN/1.73M2
GLUCOSE BLD-MCNC: 103 MG/DL (ref 70–99)
HCT VFR BLD CALC: 49.7 % (ref 37–47)
HEMOGLOBIN: 16.3 GM/DL (ref 12.5–16)
LYMPHOCYTES ABSOLUTE: 2.2 K/CU MM
LYMPHOCYTES RELATIVE PERCENT: 35.8 % (ref 24–44)
MCH RBC QN AUTO: 30.5 PG (ref 27–31)
MCHC RBC AUTO-ENTMCNC: 32.8 % (ref 32–36)
MCV RBC AUTO: 92.9 FL (ref 78–100)
MONOCYTES ABSOLUTE: 0.5 K/CU MM
MONOCYTES RELATIVE PERCENT: 7.4 % (ref 0–4)
PDW BLD-RTO: 14.4 % (ref 11.7–14.9)
PLATELET # BLD: 239 K/CU MM (ref 140–440)
PMV BLD AUTO: 10.4 FL (ref 7.5–11.1)
POTASSIUM SERPL-SCNC: 4.5 MMOL/L (ref 3.5–5.1)
RBC # BLD: 5.35 M/CU MM (ref 4.2–5.4)
SEGMENTED NEUTROPHILS ABSOLUTE COUNT: 3.4 K/CU MM
SEGMENTED NEUTROPHILS RELATIVE PERCENT: 55.5 % (ref 36–66)
SODIUM BLD-SCNC: 139 MMOL/L (ref 135–145)
TOTAL PROTEIN: 7.4 GM/DL (ref 6.4–8.2)
WBC # BLD: 6.1 K/CU MM (ref 4–10.5)

## 2022-09-23 PROCEDURE — 85025 COMPLETE CBC W/AUTO DIFF WBC: CPT

## 2022-09-23 PROCEDURE — 36415 COLL VENOUS BLD VENIPUNCTURE: CPT

## 2022-09-23 PROCEDURE — 80053 COMPREHEN METABOLIC PANEL: CPT

## 2022-09-30 ENCOUNTER — HOSPITAL ENCOUNTER (OUTPATIENT)
Dept: INFUSION THERAPY | Age: 57
Discharge: HOME | End: 2022-09-30
Payer: MEDICARE

## 2022-09-30 ENCOUNTER — OFFICE VISIT (OUTPATIENT)
Dept: ONCOLOGY | Age: 57
End: 2022-09-30
Payer: MEDICARE

## 2022-09-30 VITALS
DIASTOLIC BLOOD PRESSURE: 88 MMHG | HEIGHT: 60 IN | BODY MASS INDEX: 16.85 KG/M2 | SYSTOLIC BLOOD PRESSURE: 138 MMHG | WEIGHT: 85.8 LBS | HEART RATE: 107 BPM | TEMPERATURE: 96.8 F | RESPIRATION RATE: 18 BRPM | OXYGEN SATURATION: 97 %

## 2022-09-30 DIAGNOSIS — F43.21 GRIEF: ICD-10-CM

## 2022-09-30 DIAGNOSIS — C34.90 MALIGNANT NEOPLASM OF LUNG, UNSPECIFIED LATERALITY, UNSPECIFIED PART OF LUNG (HCC): Primary | ICD-10-CM

## 2022-09-30 PROCEDURE — G8419 CALC BMI OUT NRM PARAM NOF/U: HCPCS | Performed by: NURSE PRACTITIONER

## 2022-09-30 PROCEDURE — 99214 OFFICE O/P EST MOD 30 MIN: CPT | Performed by: NURSE PRACTITIONER

## 2022-09-30 PROCEDURE — 3017F COLORECTAL CA SCREEN DOC REV: CPT | Performed by: NURSE PRACTITIONER

## 2022-09-30 PROCEDURE — 4004F PT TOBACCO SCREEN RCVD TLK: CPT | Performed by: NURSE PRACTITIONER

## 2022-09-30 PROCEDURE — G8427 DOCREV CUR MEDS BY ELIG CLIN: HCPCS | Performed by: NURSE PRACTITIONER

## 2022-09-30 PROCEDURE — 99211 OFF/OP EST MAY X REQ PHY/QHP: CPT

## 2022-09-30 ASSESSMENT — PATIENT HEALTH QUESTIONNAIRE - PHQ9
3. TROUBLE FALLING OR STAYING ASLEEP: 0
8. MOVING OR SPEAKING SO SLOWLY THAT OTHER PEOPLE COULD HAVE NOTICED. OR THE OPPOSITE, BEING SO FIGETY OR RESTLESS THAT YOU HAVE BEEN MOVING AROUND A LOT MORE THAN USUAL: 1
2. FEELING DOWN, DEPRESSED OR HOPELESS: 3
4. FEELING TIRED OR HAVING LITTLE ENERGY: 3
SUM OF ALL RESPONSES TO PHQ9 QUESTIONS 1 & 2: 3
SUM OF ALL RESPONSES TO PHQ QUESTIONS 1-9: 7
7. TROUBLE CONCENTRATING ON THINGS, SUCH AS READING THE NEWSPAPER OR WATCHING TELEVISION: 0
SUM OF ALL RESPONSES TO PHQ QUESTIONS 1-9: 7
5. POOR APPETITE OR OVEREATING: 0
1. LITTLE INTEREST OR PLEASURE IN DOING THINGS: 0
9. THOUGHTS THAT YOU WOULD BE BETTER OFF DEAD, OR OF HURTING YOURSELF: 0
6. FEELING BAD ABOUT YOURSELF - OR THAT YOU ARE A FAILURE OR HAVE LET YOURSELF OR YOUR FAMILY DOWN: 0
10. IF YOU CHECKED OFF ANY PROBLEMS, HOW DIFFICULT HAVE THESE PROBLEMS MADE IT FOR YOU TO DO YOUR WORK, TAKE CARE OF THINGS AT HOME, OR GET ALONG WITH OTHER PEOPLE: 1
SUM OF ALL RESPONSES TO PHQ QUESTIONS 1-9: 7
SUM OF ALL RESPONSES TO PHQ QUESTIONS 1-9: 7

## 2022-09-30 NOTE — PROGRESS NOTES
MA Rooming Questions  Patient: Varinder Franz  MRN: [id number]    Date: 9/30/2022        1. Do you have any new issues? yes - Pt c/o congestion in the mornings. 2. Do you need any refills on medications?    no    3. Have you had any imaging done since your last visit? yes - CT 9/22    4. Have you been hospitalized or seen in the emergency room since your last visit here?   no    5. Did the patient have a depression screening completed today?  Yes    No data recorded     PHQ-9 Given to (if applicable):               PHQ-9 Score (if applicable):                     [] Positive     [x]  Negative              Does question #9 need addressed (if applicable)                     [] Yes    [x]  No               Redd Haywood MA

## 2022-09-30 NOTE — PROGRESS NOTES
Patient Name: Leonardo Dyer  Patient : 1965  Patient MRN: [id number]     Primary Oncologist: Jaquan Ballesteros MD  Referring Provider: Jordy Ireland MD     Date of Service: 2022      Chief Complaint:   Chief Complaint   Patient presents with    Follow-up     She came in for follow-up visit. Patient Active Problem List:     Syncope     Tobacco use disorder     Hyperlipidemia        Malignant neoplasm of upper lobe bronchus, left      Pulmonary nodule     HPI:   Shannon Stewart is a pleasant 21-year-old female patient with history of lung surgery due to the bullae in , performed by Dr. Arma Fleischer to prevent lung collapse, and high-grade adenocarcinoma of the lung, status post left upper lobe mass biopsy on 2014. Pathology report showed high-grade carcinoma with abundant necrosis. Immunostain study showed that the tumor cells were positive for CK7 and TTF1 and negative for CK5-6, Napsin A, and B63. The tumor was consistent with high-grade adenocarcinoma of the lung origin. EGFR and ALK rearrangement studies were negative. She went to the emergency room in 2013 with complaint of pain to the ribcage. CT chest without contrast on 2013 showed a 6.2 by 4.8 by 6.9 cm partially calcified large infiltrative mass involving the left upper lobe and multiple small mediastinal and left hilar nodes. Metastatic disease could not be excluded. Indeterminate hypodense lesions within the liver and involving the pancreas were described. Left adrenal gland was mildly hyperplastic. CT abdomen and pelvis on 2013 showed benign appearing left hepatic lobe mass, which was seen in 2007. This was probably an hemangioma. A smaller satellite nodule appeared benign and was probably a cyst.  No compelling evidence for solid organ metastases. There was no evidence of abnormal enhancement in the pancreatic head or neck.   Benign appearing simple cysts in the upper pole of the left kidney and bilateral nephrolithiasis were described. I discussed with her,  therefore, biopsy of the liver was not done. Blood test in December 2013 showed sodium 135, potassium 3.8, bun 17, creatinine 0.7, white cell 5.4, hemoglobin 14.2, platelet 498. CMP was December 24, 2013, showed normal sodium at 140, potassium 4.0, BUN 20, creatinine 0.7, calcium 9.8, alk phos 136, ALT 16, AST 21, albumin 4.1, total protein 7.6, total bilirubin 0.4. Pulmonary function test was okay. She was seen by Dr. Sumit Brower at [facility], who  ordered pretest study on February 7, 2014, including MRI of the brain, echo, and stress test.    PET-CT scan on January 13, 2014 showed:  1. Large left lung neoplasm., 2. Probable left hilar and mediastinal puja metastases. , 3. Asymmetric hypermetabolic activity involving the cecum. CT brain on January 8, 2014 was a normal study. Mediastinoscopy on February 14, 2014 showed: reportedly all nodes sampled were negative for the cancer. She had left lung pneumonectomy with lymph node sampling on February 27, 2014. Pathology report showed solid predominant adenocarcinoma measuring 7.9 by 6.2 by 5.1 cm in the left upper lobe, grade 3. No visceral invasion present. The tumor was confined to the lung parenchyma. The bronchial margin was negative. There was extensive lymphatic/vascular invasion and one in two lymph nodes was negative for malignancy and pathologically she was staged as T3, N0, MX.  ALK study was negative for rearrangements. She was recommended to consider adjuvant chemotherapy. I went over the NCCN Guideline and I put her on cisplatin/Alimta regimen. She received first adjuvant treatment with Alimta/cisplatin on March 19, 2014, and completed the fourth cycle of adjuvant cisplatinum/Alimta on May 21, 2014.     Blood test in May 2014 showed white cell count of 3.7, hemoglobin 13.3, platelet 539, BUN 16, creatinine 0.6, calcium 9.0, albumin 4.5, total protein 7.4, total bilirubin 0.4, alk phos 143, AST 23, ALT 18, magnesium 2.0. I planned to have a CT scan every six months in the first two or three years and yearly afterwards. CT chest in June 2014 showed no evidence of metastatic disease. She passed a kidney stone. Blood tests in December 2014 showed normal CBC. CMP was stable, with alk phos 150 and calcium 9.2. A CT of the chest in December 2014 showed no evidence of metastatic disease and no significant interval change compared to previous study. She had colonoscopy in July 2014 by Dr. Bobby Nova and reportedly there were no polyps. Blood test in June 2015 showed normal CBC. CMP was stable with alk phos 165. CT chest in June 2015 showed no evidence of progression of the disease. The osseous and soft tissue structures appeared otherwise normal.   CT chest in December 2015 revealed no evidence of metastatic disease or progression of the cancer. Blood test in October 2015 revealed normal CBC and CMP. TSH was 0.88, ferritin 78, B12 281. Iron was 92. Ferritin was 78. CMP was within normal limits. Blood test in July 2016 revealed normal CBC and CMP. CT chest in June 2016 revealed no evidence of progression of the disease. CT chest in April 2018 revealed stable post surgical changes from the left pneumonectomy with stable loculated pleural effusion and no evidence of metastatic disease in the chest. 4 mm ground-glass nodule in the right upper lobe was noted. F/U CT chest in July 2018 revealed : previously visualized ground-glass nodule no longer visualized. No significant change in appearance of the chest otherwise. CXR in April 2019 showed no acute abnormality. She quit smoking in March 2019. CT chest in October 2019 showed :  1. Stable postsurgical changes from left pneumonectomy. 2. New 5 mm subsolid nodule in the right middle lobe adjacent to the minor fissure.  This may have been present on the prior exams but had a more subtle ground-glass appearance on the prior exams. Recommend a short-term follow-up in 3 months versus per clinical protocol. 3. Emphysematous changes. Maternal aunt had recurrent lung cancer. CT chest in January 2020 showed stable 6 mm irregular RML nodule, more solid concerning for malignancy. Too small for PET or biopsy. She went to [address] [place] [address].  CT chest on May 4, 2020 showed slight increased size of the spiculated subpleural right middle lobe nodule, now approximately 10 mm maximal diameter previously 6 mm. Other ill-defined groundglass nodules in the right upper lobe and right lower lobe appeared similar. Suspect simple appearing left renal and left hepatic cyst, nonobstructing right nephrolithiasis. I discussed with Dr. Jef Parisi, thoracic surgeon who is agreeable with PET CT scan. She smokes few cigarettes a day. She decided that she may not consider any chemotherapy, should the disease come back again  CBC was stable. CMP showed increased alkaline phosphatase. PET CT scan in June 2020 and CT chest in September 2020 were reviewed. Due to enlarging nodule I have referred to radiation oncologist for potential stereotactic radiotherapy. She underwent bronchoscopy with navigational bronchoscopy on October 22, 2020. She developed pneumothorax. Final Cytologic Diagnosis:   A. Right Middle Lobe Lung Brushing cytology with cell block:        POSITIVE FOR MALIGNANCY. B.  Right Middle Lobe Lavage cytology with cell block:        Nondiagnostic. C.  Right Middle Lobe Lung Fine Needle Aspirate cytology with cell block:        POSITIVE FOR MALIGNANCY. She received stereotactic radiotherapy for 3 days and completed on November 30, 2020. CT chest on March 8, 2021  1. Status post left pneumonectomy. No thoracic adenopathy. 2. Right middle lobe 7 mm nodule abutting the minor fissure. Additional 7 mm subpleural lateral basal right lower lobe ground-glass density nodule.   Both nodules have increased in size and density compared to 10/18/2019. Adenocarcinoma spectrum lesion should be considered. 3. Severe emphysema. I compared to PET scan in June 2020 and she was agreeable to have follow up CT chest in 6 months which is already scheduled by Dr Ignacio Chamorro. Chest CT on September 21, 2021 showed  Increased size of solid component of a mixed density nodule in the right lower lobe, 5 mm, previously 2 mm, much increased over the past 6 months. Close follow-up is recommended, chest CT in 3 months recommended. Stable tiny nodules elsewhere measuring less than 1 cm. No new findings elsewhere in the chest  She saw radiation oncologist today who ordered follow-up CT chest in 3 months. She has been using CBD gummy she ordered online. It seemed to help her anxiety and pain. CT chest on December 27, 2021 revealed  1. Slight interval decrease in size of a 4 mm irregular nodule in the right middle lobe previously 6 mm. There is mild surrounding ground-glass attenuation likely reflecting posttreatment changes. 2. There is a new part solid 6 mm nodule in the anterior inferior right upper lobe as well as a new 4 mm solid nodule just anterior to the right minor fissure. Subtle slight interval increase in size of a 6 mm solid nodule in the right lower lobe laterally. Otherwise stable subcentimeter pulmonary  nodules throughout the right lung. 3. Stable postsurgical changes from left pneumonectomy. There is mucoid material within the left residual mainstem bronchus. Labs in December 2021 showed slight increase hemoglobin at 16.1 and alk phos 158. Alk phos has been fluctuating since 2013. She smokes 5 cigarettes a day. On 3/31/2022 she came in for follow up visit. Reviewed with her the CT chest in February 2022 as below. 1. Postsurgical changes of left pneumonectomy. Grossly unchanged left  loculated pleural fluid.   2. Previously measured part solid nodule in the anterior inferior right upper  lobe is no longer appreciated. Otherwise, previously measured pulmonary  nodules are grossly stable. She complains of right neck nodule. She is scheduled for the CT neck. She fell recently. MRI of brain has been ordered. I recommend she call for the test result. She is agreeable to have follow-up CT chest in 6 months. Again we discussed about smoking cessation. CBC and CMP in 2022 was stable for her. Presented for scheduled follow-up and review of scans on 2022. CT chest 710 or 2022 was a stable exam of the chest with unchanged subcentimeter right pulmonary nodules. She denies unintentional weight loss, however her weight is down. Reports has been denied in April and her mother  3 weeks later. She is grieving. She is up to referral for counseling referral.  She denies headache, bone pain. Is ongoing pain at the surgical site. PAST MEDICAL HISTORY:  Lung disease, status post lung surgery in , history of bilateral mastectomy due to the frequent lumpectomies in  by Dr. Blanca Mathias,  cholecystectomy in [facility], [facility] in . Dyslipidemia. FAMILY HISTORY:  Father had lung cancer and he was a smoker. Mother had throat cancer and colon cancer in her seventies. Maternal grandfather had colon cancer in his eighties. One maternal aunt had lung cancer. She stated one cousin was diagnosed with pancreatic cancer. SOCIAL HISTORY:  She smoked about one pack per day for 35 years and is using electronic cigarettes. She drinks alcohol occasionally. She is  and has one child. She has been retired since     LABORATORY STUDIES:   2014: White cell count 9.2, hemoglobin 13.4, platelet 753, BUN 17, creatinine 0.6, AST 20, ALT 11, calcium 9.6, alk phos 113. Review of Systems: \"Per interval history; otherwise 10 point ROS is negative. \"     Vital Signs:  /88 (Site: Right Upper Arm, Position: Sitting, Cuff Size: Medium Adult)   Pulse (!) 107 Temp 96.8 °F (36 °C) (Infrared)   Resp 18   Ht 5' (1.524 m)   Wt 85 lb 12.8 oz (38.9 kg)   LMP 01/12/2010   SpO2 97%   BMI 16.76 kg/m²     Physical Exam:  CONSTITUTIONAL: awake, alert, cooperative, no apparent distress   EYES: pupils equal, round and reactive to light, sclera clear and conjunctiva normal  ENT: Normocephalic, without obvious abnormality, atraumatic  NECK: supple, symmetrical, no jugular venous distension and no carotid bruits   HEMATOLOGIC/LYMPHATIC: no cervical, supraclavicular or axillary lymphadenopathy   LUNGS: Clear to auscultation and percussion bilaterally. BREAST: Status post bilateral mastectomy. CARDIOVASCULAR: regular rate and rhythm, normal S1 and S2, no murmur  ABDOMEN: normal bowel sound, soft, non-distended, non-tender, no masses palpated, no hepatosplenomegaly   MUSCULOSKELETAL: full range of motion noted, tone is normal  NEUROLOGIC: Motor is grossly intact. CN II - XII grossly intact. SKIN: Normal skin color, texture, turgor and no jaundice. appears intact   EXTREMITIES: no LE edema or cyanosis.      Labs:  Hematology:  Lab Results   Component Value Date    WBC 6.1 09/23/2022    RBC 5.35 09/23/2022    HGB 16.3 (H) 09/23/2022    HCT 49.7 (H) 09/23/2022    MCV 92.9 09/23/2022    MCH 30.5 09/23/2022    MCHC 32.8 09/23/2022    RDW 14.4 09/23/2022     09/23/2022    MPV 10.4 09/23/2022    SEGSPCT 55.5 09/23/2022    EOSRELPCT 1.0 09/23/2022    BASOPCT 0.3 09/23/2022    LYMPHOPCT 35.8 09/23/2022    MONOPCT 7.4 (H) 09/23/2022    SEGSABS 3.4 09/23/2022    EOSABS 0.1 09/23/2022    BASOSABS 0.0 09/23/2022    LYMPHSABS 2.2 09/23/2022    MONOSABS 0.5 09/23/2022    DIFFTYPE AUTOMATED DIFFERENTIAL 09/23/2022     Lab Results   Component Value Date    ESR 20 05/16/2012     Chemistry:  Lab Results   Component Value Date     09/23/2022    K 4.5 09/23/2022     09/23/2022    CO2 19 (L) 09/23/2022    BUN 13 09/23/2022    CREATININE 0.6 09/23/2022    GLUCOSE 103 (H) 09/23/2022 CALCIUM 10.1 09/23/2022    PROT 7.4 09/23/2022    LABALBU 4.8 09/23/2022    BILITOT 0.3 09/23/2022    ALKPHOS 139 (H) 09/23/2022    AST 24 09/23/2022    ALT 27 09/23/2022    LABGLOM >60 09/23/2022    GFRAA >60 09/23/2022    MG 2.1 10/21/2020     Lab Results   Component Value Date    TSHHS 0.806 05/16/2012     Immunology:  Lab Results   Component Value Date    PROT 7.4 09/23/2022     Coagulation Panel:  Lab Results   Component Value Date    PROTIME 10.8 (L) 10/12/2020    INR 0.89 10/12/2020    APTT 28.7 10/12/2020      Assessment & Plan:  1. She had stage II adenocarcinoma of the lung. EGFR and ALK study was negative. She had surgery of the lung and was placed on adjuvant chemotherapy, cisplatin and Alimta. She completed adjuvant chemotherapy on May 21, 2014. CBC in June 2014 was within normal limits. CMP was stable for her with alk phos 157. CT chest in June 2016 was negative for recurrent disease. CBC and CMP in July 2016 were within normal limits. CT chest in April 2017 revealed no evidence of progression of the disease. CT chest in April 2018 revealed stable findings with 4 mm ground-glass nodule to the right upper lung. Follow-up CT chest in July 2018 was reviewed. CXR in April 2019 was non acute. CT chest in October 2019 showed stable findings with new 5 mm sub-solid nodule in the right middle lobe. CT chest in January 2020 was reviewed. CT chest in May 2020 showed enlarging right middle lung nodule. PET scan in June 2020 and CT chest in September 2020 were reviewed. Since the nodule continues to get larger. I referred to see radiation oncologist for potential stereotactic radiotherapy. She has bronchoscopy in October 2021 and pathology report was positive for NSCLC. She had stereotactic [facility] to [facility] until November 30, 2020. CT  Chest in September 2021 was reviewed. CT chest in December 2021 was reviewed. Labs were reviewed. CT chest in March 2022 was reviewed.   I believe she is in remission. CT chest September 22, 2022 was stable. She continues to smoke. We discussed about smoking cessation. We will repeat CT chest in 6 months time. She has no symptoms suggestive of recurrence. 2.  I advised her to keep her other age appropriate cancer screening up-to-date. She had bilateral mastectomy, and colonoscopy in 2014. She will discuss with family doctor about her follow-up colonoscopy. 3.  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 We again discussed about smoking cessation. Check labs today. She has been taking CBD gummy which she ordered online. 4.  Grief: She after referral for counseling, declines antidepressant, reports she feels CBD Gummies are adequate at this time. She is scheduled for the CT neck and MRI of the brain within 2 weeks. Advised to call for the test result. We discussed about healthy diet and lifestyle. She had covid vaccine. RTC in 6 months or sooner. All of her questions have been answered for today. Recent imaging and labs were reviewed and discussed with the patient.

## 2022-10-05 ENCOUNTER — CLINICAL DOCUMENTATION (OUTPATIENT)
Dept: RADIATION ONCOLOGY | Age: 57
End: 2022-10-05

## 2022-10-05 NOTE — CARE COORDINATION
Referral made to [address] [place] [address], [address] [place] [address] for supportive counseling services.

## 2023-03-14 ENCOUNTER — TELEPHONE (OUTPATIENT)
Dept: ONCOLOGY | Age: 58
End: 2023-03-14

## 2023-03-14 NOTE — TELEPHONE ENCOUNTER
3/14/23 - spoke w/ pt for the 3/23/23 CT scan at [facility] arrival time of 9:30 am and NPO 4 hours prior. Pt was already aware of the appt.

## 2023-03-23 ENCOUNTER — HOSPITAL ENCOUNTER (OUTPATIENT)
Dept: CT IMAGING | Age: 58
Discharge: HOME | End: 2023-03-23
Payer: MEDICARE

## 2023-03-23 ENCOUNTER — HOSPITAL ENCOUNTER (OUTPATIENT)
Age: 58
Discharge: HOME | End: 2023-03-23
Payer: MEDICARE

## 2023-03-23 DIAGNOSIS — C34.90 MALIGNANT NEOPLASM OF LUNG, UNSPECIFIED LATERALITY, UNSPECIFIED PART OF LUNG (HCC): ICD-10-CM

## 2023-03-23 LAB
BASOPHILS ABSOLUTE: 0 K/CU MM
BASOPHILS RELATIVE PERCENT: 0.6 % (ref 0–1)
DIFFERENTIAL TYPE: ABNORMAL
EGFR, POC: >60 ML/MIN/1.73M2
EOSINOPHILS ABSOLUTE: 0.1 K/CU MM
EOSINOPHILS RELATIVE PERCENT: 1.4 % (ref 0–3)
HCT VFR BLD CALC: 46.2 % (ref 37–47)
HEMOGLOBIN: 15 GM/DL (ref 12.5–16)
IMMATURE NEUTROPHIL %: 0.2 % (ref 0–0.43)
LYMPHOCYTES ABSOLUTE: 2 K/CU MM
LYMPHOCYTES RELATIVE PERCENT: 39.4 % (ref 24–44)
MCH RBC QN AUTO: 30.2 PG (ref 27–31)
MCHC RBC AUTO-ENTMCNC: 32.5 % (ref 32–36)
MCV RBC AUTO: 93 FL (ref 78–100)
MONOCYTES ABSOLUTE: 0.4 K/CU MM
MONOCYTES RELATIVE PERCENT: 7 % (ref 0–4)
NUCLEATED RBC %: 0 %
PDW BLD-RTO: 13.5 % (ref 11.7–14.9)
PLATELET # BLD: 259 K/CU MM (ref 140–440)
PMV BLD AUTO: 11.1 FL (ref 7.5–11.1)
POC CREATININE: 0.8 MG/DL (ref 0.6–1.1)
RBC # BLD: 4.97 M/CU MM (ref 4.2–5.4)
REASON FOR REJECTION: NORMAL
REJECTED TEST: NORMAL
SEGMENTED NEUTROPHILS ABSOLUTE COUNT: 2.6 K/CU MM
SEGMENTED NEUTROPHILS RELATIVE PERCENT: 51.4 % (ref 36–66)
TOTAL IMMATURE NEUTOROPHIL: 0.01 K/CU MM
TOTAL NUCLEATED RBC: 0 K/CU MM
WBC # BLD: 5.1 K/CU MM (ref 4–10.5)

## 2023-03-23 PROCEDURE — 6360000004 HC RX CONTRAST MEDICATION: Performed by: NURSE PRACTITIONER

## 2023-03-23 PROCEDURE — 71260 CT THORAX DX C+: CPT

## 2023-03-23 PROCEDURE — 82565 ASSAY OF CREATININE: CPT

## 2023-03-23 PROCEDURE — 85025 COMPLETE CBC W/AUTO DIFF WBC: CPT

## 2023-03-23 PROCEDURE — 36415 COLL VENOUS BLD VENIPUNCTURE: CPT

## 2023-03-23 PROCEDURE — 2580000003 HC RX 258: Performed by: NURSE PRACTITIONER

## 2023-03-23 RX ORDER — 0.9 % SODIUM CHLORIDE 0.9 %
10 VIAL (ML) INJECTION
Status: COMPLETED | OUTPATIENT
Start: 2023-03-23 | End: 2023-03-23

## 2023-03-23 RX ADMIN — IOPAMIDOL 75 ML: 755 INJECTION, SOLUTION INTRAVENOUS at 10:30

## 2023-03-23 RX ADMIN — SODIUM CHLORIDE, PRESERVATIVE FREE 10 ML: 5 INJECTION INTRAVENOUS at 10:31

## 2023-03-30 ENCOUNTER — OFFICE VISIT (OUTPATIENT)
Dept: ONCOLOGY | Age: 58
End: 2023-03-30
Payer: MEDICARE

## 2023-03-30 ENCOUNTER — HOSPITAL ENCOUNTER (OUTPATIENT)
Dept: INFUSION THERAPY | Age: 58
Discharge: HOME | End: 2023-03-30
Payer: MEDICARE

## 2023-03-30 VITALS
DIASTOLIC BLOOD PRESSURE: 78 MMHG | SYSTOLIC BLOOD PRESSURE: 137 MMHG | TEMPERATURE: 97.9 F | OXYGEN SATURATION: 95 % | RESPIRATION RATE: 18 BRPM | HEART RATE: 84 BPM | BODY MASS INDEX: 17.98 KG/M2 | HEIGHT: 60 IN | WEIGHT: 91.6 LBS

## 2023-03-30 DIAGNOSIS — C34.90 MALIGNANT NEOPLASM OF LUNG, UNSPECIFIED LATERALITY, UNSPECIFIED PART OF LUNG (HCC): Primary | ICD-10-CM

## 2023-03-30 PROCEDURE — 99213 OFFICE O/P EST LOW 20 MIN: CPT | Performed by: INTERNAL MEDICINE

## 2023-03-30 PROCEDURE — 3017F COLORECTAL CA SCREEN DOC REV: CPT | Performed by: INTERNAL MEDICINE

## 2023-03-30 PROCEDURE — G8427 DOCREV CUR MEDS BY ELIG CLIN: HCPCS | Performed by: INTERNAL MEDICINE

## 2023-03-30 PROCEDURE — G8484 FLU IMMUNIZE NO ADMIN: HCPCS | Performed by: INTERNAL MEDICINE

## 2023-03-30 PROCEDURE — 4004F PT TOBACCO SCREEN RCVD TLK: CPT | Performed by: INTERNAL MEDICINE

## 2023-03-30 PROCEDURE — 99211 OFF/OP EST MAY X REQ PHY/QHP: CPT

## 2023-03-30 PROCEDURE — G8419 CALC BMI OUT NRM PARAM NOF/U: HCPCS | Performed by: INTERNAL MEDICINE

## 2023-03-30 ASSESSMENT — PATIENT HEALTH QUESTIONNAIRE - PHQ9
SUM OF ALL RESPONSES TO PHQ9 QUESTIONS 1 & 2: 0
SUM OF ALL RESPONSES TO PHQ QUESTIONS 1-9: 0
SUM OF ALL RESPONSES TO PHQ QUESTIONS 1-9: 0
1. LITTLE INTEREST OR PLEASURE IN DOING THINGS: 0
SUM OF ALL RESPONSES TO PHQ QUESTIONS 1-9: 0
2. FEELING DOWN, DEPRESSED OR HOPELESS: 0
SUM OF ALL RESPONSES TO PHQ QUESTIONS 1-9: 0

## 2023-03-30 NOTE — PROGRESS NOTES
MA Rooming Questions  Patient: Kennedy Barrow  MRN: [id number]    Date: 3/30/2023        1. Do you have any new issues?   no         2. Do you need any refills on medications?    no    3. Have you had any imaging done since your last visit? yes - CT    4. Have you been hospitalized or seen in the emergency room since your last visit here?   no    5. Did the patient have a depression screening completed today?  Yes    No data recorded     PHQ-9 Given to (if applicable):               PHQ-9 Score (if applicable):                     [] Positive     []  Negative              Does question #9 need addressed (if applicable)                     [] Yes    []  No               Jose A Mcdonald CMA
kidney and bilateral nephrolithiasis were described. I discussed with her,  therefore, biopsy of the liver was not done. Blood test in December 2013 showed sodium 135, potassium 3.8, bun 17, creatinine 0.7, white cell 5.4, hemoglobin 14.2, platelet 160. CMP was December 24, 2013, showed normal sodium at 140, potassium 4.0, BUN 20, creatinine 0.7, calcium 9.8, alk phos 136, ALT 16, AST 21, albumin 4.1, total protein 7.6, total bilirubin 0.4. Pulmonary function test was okay. She was seen by Dr. Ricky Monaco at [facility], who  ordered pretest study on February 7, 2014, including MRI of the brain, echo, and stress test.    PET-CT scan on January 13, 2014 showed:  1. Large left lung neoplasm., 2. Probable left hilar and mediastinal puja metastases. , 3. Asymmetric hypermetabolic activity involving the cecum. CT brain on January 8, 2014 was a normal study. Mediastinoscopy on February 14, 2014 showed: reportedly all nodes sampled were negative for the cancer. She had left lung pneumonectomy with lymph node sampling on February 27, 2014. Pathology report showed solid predominant adenocarcinoma measuring 7.9 by 6.2 by 5.1 cm in the left upper lobe, grade 3. No visceral invasion present. The tumor was confined to the lung parenchyma. The bronchial margin was negative. There was extensive lymphatic/vascular invasion and one in two lymph nodes was negative for malignancy and pathologically she was staged as T3, N0, MX.  ALK study was negative for rearrangements. She was recommended to consider adjuvant chemotherapy. I went over the NCCN Guideline and I put her on cisplatin/Alimta regimen. She received first adjuvant treatment with Alimta/cisplatin on March 19, 2014, and completed the fourth cycle of adjuvant cisplatinum/Alimta on May 21, 2014.     Blood test in May 2014 showed white cell count of 3.7, hemoglobin 13.3, platelet 622, BUN 16, creatinine 0.6, calcium 9.0, albumin 4.5, total protein 7.4, total

## 2023-05-30 ENCOUNTER — CLINICAL DOCUMENTATION (OUTPATIENT)
Dept: ONCOLOGY | Age: 58
End: 2023-05-30

## 2023-09-25 ENCOUNTER — HOSPITAL ENCOUNTER (OUTPATIENT)
Facility: HOSPITAL | Age: 58
Discharge: HOME | End: 2023-09-28
Attending: INTERNAL MEDICINE
Payer: MEDICARE

## 2023-09-25 DIAGNOSIS — C34.12 MALIGNANT NEOPLASM OF UPPER LOBE, LEFT BRONCHUS OR LUNG (HCC): ICD-10-CM

## 2023-09-25 LAB — CREAT UR-MCNC: 0.7 MG/DL (ref 0.6–1.3)

## 2023-09-25 PROCEDURE — 82565 ASSAY OF CREATININE: CPT

## 2023-09-25 PROCEDURE — 6360000004 HC RX CONTRAST MEDICATION: Performed by: INTERNAL MEDICINE

## 2023-09-25 PROCEDURE — 71260 CT THORAX DX C+: CPT

## 2023-09-25 RX ADMIN — IOPAMIDOL 90 ML: 612 INJECTION, SOLUTION INTRAVENOUS at 16:42

## 2023-11-07 ENCOUNTER — HOSPITAL ENCOUNTER (OUTPATIENT)
Facility: HOSPITAL | Age: 58
Discharge: HOME | End: 2023-11-10
Payer: MEDICARE

## 2023-11-07 DIAGNOSIS — G89.29 CHRONIC LOW BACK PAIN WITHOUT SCIATICA, UNSPECIFIED BACK PAIN LATERALITY: ICD-10-CM

## 2023-11-07 DIAGNOSIS — M54.50 CHRONIC LOW BACK PAIN WITHOUT SCIATICA, UNSPECIFIED BACK PAIN LATERALITY: ICD-10-CM

## 2023-11-07 PROCEDURE — 72110 X-RAY EXAM L-2 SPINE 4/>VWS: CPT

## 2024-01-12 ENCOUNTER — HOSPITAL ENCOUNTER (OUTPATIENT)
Facility: HOSPITAL | Age: 59
End: 2024-01-12
Attending: INTERNAL MEDICINE
Payer: MEDICARE

## 2024-01-12 DIAGNOSIS — C34.12 SQUAMOUS CELL CARCINOMA OF BRONCHUS IN LEFT UPPER LOBE (HCC): ICD-10-CM

## 2024-01-12 PROCEDURE — 6360000004 HC RX CONTRAST MEDICATION: Performed by: INTERNAL MEDICINE

## 2024-01-12 PROCEDURE — 74177 CT ABD & PELVIS W/CONTRAST: CPT

## 2024-01-12 RX ADMIN — IOPAMIDOL 100 ML: 612 INJECTION, SOLUTION INTRAVENOUS at 16:27

## 2024-01-12 RX ADMIN — DIATRIZOATE MEGLUMINE AND DIATRIZOATE SODIUM 30 ML: 660; 100 LIQUID ORAL; RECTAL at 16:33

## 2024-02-08 ENCOUNTER — HOSPITAL ENCOUNTER (OUTPATIENT)
Facility: HOSPITAL | Age: 59
Discharge: HOME | End: 2024-02-08
Payer: MEDICARE

## 2024-02-08 DIAGNOSIS — C34.12 SQUAMOUS CELL CARCINOMA OF BRONCHUS IN LEFT UPPER LOBE (HCC): ICD-10-CM

## 2024-02-08 PROCEDURE — 78815 PET IMAGE W/CT SKULL-THIGH: CPT

## 2024-02-08 PROCEDURE — 3430000000 HC RX DIAGNOSTIC RADIOPHARMACEUTICAL

## 2024-02-08 PROCEDURE — A9609 HC RX DIAGNOSTIC RADIOPHARMACEUTICAL: HCPCS

## 2024-02-08 PROCEDURE — 2500000003 HC RX 250 WO HCPCS

## 2024-02-08 RX ORDER — FLUDEOXYGLUCOSE F-18 500 MCI/ML
13.33 INJECTION INTRAVENOUS
Status: COMPLETED | OUTPATIENT
Start: 2024-02-08 | End: 2024-02-08

## 2024-02-08 RX ADMIN — FLUDEOXYGLUCOSE F-18 13.33 MILLICURIE: 500 INJECTION INTRAVENOUS at 11:32

## 2024-02-08 RX ADMIN — BARIUM SULFATE 450 ML: 21 SUSPENSION ORAL at 11:32

## 2024-08-06 ENCOUNTER — HOSPITAL ENCOUNTER (OUTPATIENT)
Facility: HOSPITAL | Age: 59
Discharge: HOME | End: 2024-08-09
Payer: MEDICARE

## 2024-08-06 DIAGNOSIS — C34.12 MALIGNANT NEOPLASM OF UPPER LOBE, LEFT BRONCHUS OR LUNG (HCC): ICD-10-CM

## 2024-08-06 LAB — CREAT UR-MCNC: 0.6 MG/DL (ref 0.6–1.3)

## 2024-08-06 PROCEDURE — 6360000004 HC RX CONTRAST MEDICATION

## 2024-08-06 PROCEDURE — 71260 CT THORAX DX C+: CPT

## 2024-08-06 PROCEDURE — 82565 ASSAY OF CREATININE: CPT

## 2024-08-06 RX ADMIN — IOPAMIDOL 80 ML: 612 INJECTION, SOLUTION INTRAVENOUS at 11:06

## 2024-12-26 ENCOUNTER — HOSPITAL ENCOUNTER (OUTPATIENT)
Facility: HOSPITAL | Age: 59
Discharge: HOME | End: 2024-12-29
Payer: MEDICARE

## 2024-12-26 DIAGNOSIS — C34.12 SQUAMOUS CELL CARCINOMA OF BRONCHUS IN LEFT UPPER LOBE (HCC): ICD-10-CM

## 2024-12-26 PROCEDURE — A9609 HC RX DIAGNOSTIC RADIOPHARMACEUTICAL: HCPCS

## 2024-12-26 PROCEDURE — 78815 PET IMAGE W/CT SKULL-THIGH: CPT

## 2024-12-26 PROCEDURE — 2500000003 HC RX 250 WO HCPCS

## 2024-12-26 PROCEDURE — 3430000000 HC RX DIAGNOSTIC RADIOPHARMACEUTICAL

## 2024-12-26 RX ORDER — FLUDEOXYGLUCOSE F-18 500 MCI/ML
12.53 INJECTION INTRAVENOUS
Status: COMPLETED | OUTPATIENT
Start: 2024-12-26 | End: 2024-12-26

## 2024-12-26 RX ADMIN — BARIUM SULFATE 450 ML: 20 SUSPENSION ORAL at 08:43

## 2024-12-26 RX ADMIN — FLUDEOXYGLUCOSE F-18 12.53 MILLICURIE: 500 INJECTION INTRAVENOUS at 08:43

## 2025-03-17 ENCOUNTER — TRANSCRIBE ORDERS (OUTPATIENT)
Facility: HOSPITAL | Age: 60
End: 2025-03-17

## 2025-03-17 DIAGNOSIS — R97.0 ELEVATED CARCINOEMBRYONIC ANTIGEN (CEA): Primary | ICD-10-CM

## 2025-03-17 DIAGNOSIS — C34.12 MALIGNANT NEOPLASM OF UPPER LOBE BRONCHUS, LEFT (HCC): ICD-10-CM

## 2025-03-27 ENCOUNTER — HOSPITAL ENCOUNTER (OUTPATIENT)
Facility: HOSPITAL | Age: 60
Discharge: HOME | End: 2025-03-30
Payer: MEDICARE

## 2025-03-27 DIAGNOSIS — C34.12 MALIGNANT NEOPLASM OF UPPER LOBE BRONCHUS, LEFT (HCC): ICD-10-CM

## 2025-03-27 DIAGNOSIS — R97.0 ELEVATED CARCINOEMBRYONIC ANTIGEN (CEA): ICD-10-CM

## 2025-03-27 PROCEDURE — 71250 CT THORAX DX C-: CPT

## 2025-06-06 ENCOUNTER — HOSPITAL ENCOUNTER (OUTPATIENT)
Facility: HOSPITAL | Age: 60
Discharge: HOME | End: 2025-06-09
Attending: INTERNAL MEDICINE
Payer: MEDICARE

## 2025-06-06 DIAGNOSIS — C34.12 SQUAMOUS CELL CARCINOMA OF BRONCHUS IN LEFT UPPER LOBE (HCC): ICD-10-CM

## 2025-06-06 PROCEDURE — 3430000000 HC RX DIAGNOSTIC RADIOPHARMACEUTICAL: Performed by: INTERNAL MEDICINE

## 2025-06-06 PROCEDURE — 78815 PET IMAGE W/CT SKULL-THIGH: CPT

## 2025-06-06 PROCEDURE — A9609 HC RX DIAGNOSTIC RADIOPHARMACEUTICAL: HCPCS | Performed by: INTERNAL MEDICINE

## 2025-06-06 RX ORDER — FLUDEOXYGLUCOSE F 18 200 MCI/ML
10.8 INJECTION, SOLUTION INTRAVENOUS
Status: COMPLETED | OUTPATIENT
Start: 2025-06-06 | End: 2025-06-06

## 2025-06-06 RX ADMIN — FLUDEOXYGLUCOSE F 18 10.8 MILLICURIE: 200 INJECTION, SOLUTION INTRAVENOUS at 17:27

## (undated) DEVICE — AIRLIFE™ NASAL OXYGEN CANNULA CURVED, NONFLARED TIP, WITH 7' FEET (2.1 M) CRUSH RESISTANT TUBING, OVER-THE-EAR STYLE: Brand: AIRLIFE™

## (undated) DEVICE — Z DISCONTINUED NO SUB IDED TUBING ETCO2 AD L6.5FT NSL ORAL CVD PRNG NONFLARED TIP OVR